# Patient Record
Sex: MALE | Employment: OTHER | URBAN - METROPOLITAN AREA
[De-identification: names, ages, dates, MRNs, and addresses within clinical notes are randomized per-mention and may not be internally consistent; named-entity substitution may affect disease eponyms.]

---

## 2017-11-15 ENCOUNTER — TRANSFERRED RECORDS (OUTPATIENT)
Dept: FAMILY MEDICINE | Facility: CLINIC | Age: 62
End: 2017-11-15

## 2017-12-28 DIAGNOSIS — Z76.0 ENCOUNTER FOR MEDICATION REFILL: ICD-10-CM

## 2017-12-28 DIAGNOSIS — I10 BENIGN ESSENTIAL HYPERTENSION: ICD-10-CM

## 2017-12-28 RX ORDER — LISINOPRIL 10 MG/1
TABLET ORAL
Qty: 90 TABLET | Refills: 0 | Status: SHIPPED | OUTPATIENT
Start: 2017-12-28 | End: 2018-01-26

## 2017-12-28 RX ORDER — METOPROLOL SUCCINATE 50 MG/1
TABLET, EXTENDED RELEASE ORAL
Qty: 90 TABLET | Refills: 0 | Status: SHIPPED | OUTPATIENT
Start: 2017-12-28 | End: 2018-05-01

## 2017-12-28 NOTE — TELEPHONE ENCOUNTER
lisinopril (PRINIVIL/ZESTRIL) 10 MG,metoprolol (TOPROL-XL) 50 MG 24 hr tablet   LAST  Med check 10/31/16   last labs(for RX) 10/31/16  Next  appt scheduled =  None- due  Marcie Freitas MA    BP Readings from Last 3 Encounters:   11/10/16 145/76   10/31/16 110/62   09/30/16 135/82     Last Basic Metabolic Panel:  Lab Results   Component Value Date     10/31/2016      Lab Results   Component Value Date    POTASSIUM 4.7 10/31/2016     Lab Results   Component Value Date    CHLORIDE 102 10/31/2016     Lab Results   Component Value Date    WILLY 9.0 10/31/2016     No results found for: CO2  Lab Results   Component Value Date    BUN 22 10/31/2016    BUN 20 10/31/2016     Lab Results   Component Value Date    CR 1.10 10/31/2016     Lab Results   Component Value Date     10/31/2016

## 2018-01-12 ENCOUNTER — OFFICE VISIT (OUTPATIENT)
Dept: FAMILY MEDICINE | Facility: CLINIC | Age: 63
End: 2018-01-12

## 2018-01-12 ENCOUNTER — OFFICE VISIT (OUTPATIENT)
Dept: CARDIOLOGY | Facility: CLINIC | Age: 63
End: 2018-01-12
Payer: COMMERCIAL

## 2018-01-12 VITALS
WEIGHT: 200 LBS | SYSTOLIC BLOOD PRESSURE: 122 MMHG | BODY MASS INDEX: 28 KG/M2 | HEIGHT: 71 IN | TEMPERATURE: 98.7 F | RESPIRATION RATE: 20 BRPM | DIASTOLIC BLOOD PRESSURE: 74 MMHG | HEART RATE: 60 BPM | OXYGEN SATURATION: 99 %

## 2018-01-12 VITALS
SYSTOLIC BLOOD PRESSURE: 132 MMHG | WEIGHT: 202 LBS | HEIGHT: 70 IN | HEART RATE: 60 BPM | DIASTOLIC BLOOD PRESSURE: 76 MMHG | BODY MASS INDEX: 28.92 KG/M2

## 2018-01-12 DIAGNOSIS — Z82.49 FAMILY HISTORY OF ISCHEMIC HEART DISEASE: ICD-10-CM

## 2018-01-12 DIAGNOSIS — F10.21 ALCOHOL DEPENDENCE IN REMISSION (H): ICD-10-CM

## 2018-01-12 DIAGNOSIS — Z72.0 TOBACCO ABUSE: ICD-10-CM

## 2018-01-12 DIAGNOSIS — I10 ESSENTIAL HYPERTENSION: Primary | ICD-10-CM

## 2018-01-12 DIAGNOSIS — Z13.220 LIPID SCREENING: ICD-10-CM

## 2018-01-12 DIAGNOSIS — I49.3 PVC'S (PREMATURE VENTRICULAR CONTRACTIONS): ICD-10-CM

## 2018-01-12 DIAGNOSIS — Z12.5 SCREENING FOR PROSTATE CANCER: ICD-10-CM

## 2018-01-12 PROBLEM — F10.20 ALCOHOL DEPENDENCE (H): Status: ACTIVE | Noted: 2018-01-12

## 2018-01-12 PROCEDURE — 83735 ASSAY OF MAGNESIUM: CPT | Mod: 90 | Performed by: FAMILY MEDICINE

## 2018-01-12 PROCEDURE — 80053 COMPREHEN METABOLIC PANEL: CPT | Mod: 90 | Performed by: FAMILY MEDICINE

## 2018-01-12 PROCEDURE — 99214 OFFICE O/P EST MOD 30 MIN: CPT | Performed by: INTERNAL MEDICINE

## 2018-01-12 PROCEDURE — 84443 ASSAY THYROID STIM HORMONE: CPT | Mod: 90 | Performed by: FAMILY MEDICINE

## 2018-01-12 PROCEDURE — 80061 LIPID PANEL: CPT | Mod: 90 | Performed by: FAMILY MEDICINE

## 2018-01-12 PROCEDURE — 99213 OFFICE O/P EST LOW 20 MIN: CPT | Performed by: FAMILY MEDICINE

## 2018-01-12 PROCEDURE — 93000 ELECTROCARDIOGRAM COMPLETE: CPT | Performed by: INTERNAL MEDICINE

## 2018-01-12 PROCEDURE — 36415 COLL VENOUS BLD VENIPUNCTURE: CPT | Performed by: FAMILY MEDICINE

## 2018-01-12 PROCEDURE — 84153 ASSAY OF PSA TOTAL: CPT | Mod: 90 | Performed by: FAMILY MEDICINE

## 2018-01-12 NOTE — LETTER
1/12/2018      Jorge L Mullen MD  [address]  [facility] [address]      RE: Christophe Del Rosario       Dear Colleague,    I had the pleasure of seeing Christophe Del Rosario in the [facility].    REFERRING PHYSICIAN:  Dr. Jorge L Mullen      HISTORY OF PRESENT ILLNESS:  It is my pleasure to see your patient, Christophe Del Rosario, who is a very pleasant 62-year-old gentleman who in the past had a diagnosis of essential hypertension, borderline hyperlipidemia and mild obstructive sleep apnea.  He also smokes about 5 cigarettes per day.  He has a significant family history for cardiovascular disease.  His father around this age suffered a myocardial infarct.  His brother who is several years younger than him suffered a stroke recently.  His last lipid profile that I see was from 2016 and this was borderline for primary prevention.  His LDL was 122, HDL was 51 and triglycerides were 61 with total cholesterol of 185.  He is not complaining of any chest pains or chest pressure.  In the past, he has complained of chest discomfort and had a stress echocardiogram performed approximately 2-1/2 years ago.  This was normal with no evidence of ischemia.  We did perform a 12-lead electrocardiogram today as we always do with our new patients and this showed that the patient was having quite frequent PVCs.  He has an early repolarization in V2.  Otherwise, no ischemic changes were noted.        He does not complain of undue shortness of breath, arm, throat or jaw discomfort with exertion.  He does not feel palpitations.  He has had no syncope or near syncope.  He is not complaining of the symptoms of congestive heart failure either.      IMPRESSION:   1.  Family history of early atherosclerosis, as described above.   2.  Tobacco abuse.   3.  Essential hypertension under good control with a blood pressure of 132/76 recorded today.   4.  Frequent PVCs on the EKG.      PLAN:     1.  Firstly, with  respect to the PVCs, we will check a basic metabolic profile, magnesium and TSH.  We will also perform a stress echocardiogram to determine if there is any evidence of ischemia or structural heart disease underlying the PVCs.     2.  Because of his interest in determining his risk for cardiovascular disease, we will perform a calcium score to determine if he has any evidence of coronary atherosclerosis.  The presence of coronary atherosclerosis would drastically change his lipid management.     3.  We had a discussion at the end of our visit about stopping smoking, and  the risks associated with smoking but in particular, the major risk of cardiovascular disease.  I will see the patient back again in a month's time with the results of our tests.        It has been my pleasure being involved in the care of this very nice patient.       Outpatient Encounter Prescriptions as of 1/12/2018   Medication Sig Dispense Refill     lisinopril (PRINIVIL/ZESTRIL) 10 MG tablet TAKE 1 TABLET(10 MG) BY MOUTH DAILY 90 tablet 0     metoprolol (TOPROL-XL) 50 MG 24 hr tablet TAKE 1 TABLET(50 MG) BY MOUTH DAILY 90 tablet 0     aspirin 81 MG chewable tablet Take 1 tablet (81 mg) by mouth daily 108 tablet 3     [DISCONTINUED] metoprolol (TOPROL-XL) 100 MG 24 hr tablet TAKE 1 TABLET BY MOUTH DAILY 90 tablet 0     [DISCONTINUED] Naproxen Sodium (ALEVE PO) Take 220 mg by mouth as needed for moderate pain       No facility-administered encounter medications on file as of 1/12/2018.        Again, thank you for allowing me to participate in the care of your patient.      Sincerely,    Reji Medellin MD, MD     [facility]

## 2018-01-12 NOTE — PROGRESS NOTES
Problem(s) Oriented visit        SUBJECTIVE:                                                    Christophe Del Rosario is a 62 year old male who presents to clinic today for the following health issues :      1. Essential hypertension  Blood presure remains well controlled when checked out of clinic.    Reviewed last 6 BP readings in chart:  BP Readings from Last 6 Encounters:   01/12/18 122/74   01/12/18 132/76   11/10/16 145/76   10/31/16 110/62   09/30/16 135/82   09/14/16 110/68       he has not experienced any significant side effects from medications for hypertension.    NO active cardiac complaints or symptoms with exercise.    - Comp. Metabolic Panel (14) ([facility])  - TSH ([facility])  - Magnesium  Serum ([facility])    2. Family history of ischemic heart disease  Saw cardiology this morning and is now scheduled to have some tests  - TSH ([facility])    3. Alcohol dependence in process of decreasing  Down from 25-30 a week to 15-20 per week    4. Lipid screening  due  - Lipid Panel ([facility])    5. Screening for prostate cancer  due  - PSA Serum ([facility])         Problem list, Medication list, Allergies, and Medical/Social/Surgical histories reviewed in [facility] and updated as appropriate.   Additional history: as documented    ROS:  General and Resp. completed and negative except as noted above    Histories:   Patient Active Problem List   Diagnosis     HTN (hypertension)     Health Care Home     Advance Care Planning     Hip pain, left     Essential hypertension     BARTOLO (obstructive sleep apnea)     Chest pain     Alcohol dependence (H)     History reviewed. No pertinent surgical history.    Social History   Substance Use Topics     Smoking status: Current Every Day Smoker     Packs/day: 0.10     Types: Cigarettes     Smokeless tobacco: Never Used      Comment: 3-4 cigs a day     Alcohol use 12.0 oz/week     20 Standard drinks or equivalent per week      Comment: 15-20 per week     Family History   Problem Relation Age of  "Onset     Coronary Artery Disease Father      CEREBROVASCULAR DISEASE Brother 53     small stroke           OBJECTIVE:                                                    /74  Pulse 60  Temp 98.7  F (37.1  C)  Resp 20  Ht 1.803 m (5' 11\")  Wt 90.7 kg (200 lb)  SpO2 99%  BMI 27.89 kg/m2  Body mass index is 27.89 kg/(m^2).   APPEARANCE: = Relaxed and in no distress     ASSESSMENT/PLAN:                                                        Christophe was seen today for recheck medication.    Diagnoses and all orders for this visit:    Essential hypertension  -     Comp. Metabolic Panel (14) ([facility])  -     TSH ([facility])  -     Magnesium  Serum ([facility])    Family history of ischemic heart disease  -     TSH ([facility])    Alcohol dependence in process of decreasing    Lipid screening  -     Lipid Panel ([facility])    Screening for prostate cancer  -     PSA Serum ([facility])        Work on weight loss  Regular exercise  Drink less    The following health maintenance items are reviewed in Epic and correct as of today:  Health Maintenance   Topic Date Due     HEPATITIS C SCREENING  10/21/1973     COLONOSCOPY Q5 YR  01/05/2014     INFLUENZA VACCINE (SYSTEM ASSIGNED)  09/01/2017     ADVANCE DIRECTIVE PLANNING Q5 YRS  01/26/2021     LIPID SCREEN Q5 YR MALE (SYSTEM ASSIGNED)  10/31/2021     TETANUS IMMUNIZATION (SYSTEM ASSIGNED)  08/27/2023       Jorge L Mullen MD  [facility]  Family Practice  [facility]  [phone]    For any issues my office # is [phone]        "

## 2018-01-12 NOTE — PROGRESS NOTES
REFERRING PHYSICIAN:  Dr. Jorge L Mullen      HISTORY OF PRESENT ILLNESS:  It is my pleasure to see your patient, Christophe Del Rosario, who is a very pleasant 62-year-old gentleman who in the past had a diagnosis of essential hypertension, borderline hyperlipidemia and mild obstructive sleep apnea.  He also smokes about 5 cigarettes per day.  He has a significant family history for cardiovascular disease.  His father around this age suffered a myocardial infarct.  His brother who is several years younger than him suffered a stroke recently.  His last lipid profile that I see was from 2016 and this was borderline for primary prevention.  His LDL was 122, HDL was 51 and triglycerides were 61 with total cholesterol of 185.  He is not complaining of any chest pains or chest pressure.  In the past, he has complained of chest discomfort and had a stress echocardiogram performed approximately 2-1/2 years ago.  This was normal with no evidence of ischemia.  We did perform a 12-lead electrocardiogram today as we always do with our new patients and this showed that the patient was having quite frequent PVCs.  He has an early repolarization in V2.  Otherwise, no ischemic changes were noted.        He does not complain of undue shortness of breath, arm, throat or jaw discomfort with exertion.  He does not feel palpitations.  He has had no syncope or near syncope.  He is not complaining of the symptoms of congestive heart failure either.      IMPRESSION:   1.  Family history of early atherosclerosis, as described above.   2.  Tobacco abuse.   3.  Essential hypertension under good control with a blood pressure of 132/76 recorded today.   4.  Frequent PVCs on the EKG.      PLAN:     1.  Firstly, with respect to the PVCs, we will check a basic metabolic profile, magnesium and TSH.  We will also perform a stress echocardiogram to determine if there is any evidence of ischemia or structural heart disease underlying the PVCs.     2.   Because of his interest in determining his risk for cardiovascular disease, we will perform a calcium score to determine if he has any evidence of coronary atherosclerosis.  The presence of coronary atherosclerosis would drastically change his lipid management.     3.  We had a discussion at the end of our visit about stopping smoking, and  the risks associated with smoking but in particular, the major risk of cardiovascular disease.  I will see the patient back again in a month's time with the results of our tests.        It has been my pleasure being involved in the care of this very nice patient.      Reji Sahni MD, FACC      cc:   Jorge L Mullen MD    [facility]   [address] [place] [address]         REJI SAHNI MD, FACC             D: 2018 08:59   T: 2018 11:06   MT: BARBRA      Name:     CHRYSTAL JACKSON   MRN:      [id number]        Account:      [id number]   :      1955           Service Date: 2018      Document: [id number]

## 2018-01-12 NOTE — PROGRESS NOTES
HPI and Plan:   See dictation    Orders Placed This Encounter   Procedures     CT Coronary Calcium Scan     Basic metabolic panel     TSH     Magnesium     Lipid Profile     ALT     Follow-Up with Cardiologist     EKG 12-lead complete w/read - Clinics (performed today)     Exercise Stress Echocardiogram       No orders of the defined types were placed in this encounter.      Medications Discontinued During This Encounter   Medication Reason     metoprolol (TOPROL-XL) 100 MG 24 hr tablet Stopped by Patient     Naproxen Sodium (ALEVE PO) Stopped by Patient         Encounter Diagnoses   Name Primary?     Essential hypertension Yes     PVC's (premature ventricular contractions)      Family history of ischemic heart disease      Tobacco abuse        CURRENT MEDICATIONS:  Current Outpatient Prescriptions   Medication Sig Dispense Refill     lisinopril (PRINIVIL/ZESTRIL) 10 MG tablet TAKE 1 TABLET(10 MG) BY MOUTH DAILY 90 tablet 0     metoprolol (TOPROL-XL) 50 MG 24 hr tablet TAKE 1 TABLET(50 MG) BY MOUTH DAILY 90 tablet 0     aspirin 81 MG chewable tablet Take 1 tablet (81 mg) by mouth daily 108 tablet 3     [DISCONTINUED] metoprolol (TOPROL-XL) 100 MG 24 hr tablet TAKE 1 TABLET BY MOUTH DAILY 90 tablet 0       ALLERGIES   No Known Allergies    PAST MEDICAL HISTORY:  Past Medical History:   Diagnosis Date     Chest pain      HTN (hypertension) 2/27/2012       PAST SURGICAL HISTORY:  No past surgical history on file.    FAMILY HISTORY:  Family History   Problem Relation Age of Onset     Coronary Artery Disease Father      CEREBROVASCULAR DISEASE Brother        SOCIAL HISTORY:  Social History     Social History     Marital status:      Spouse name: Flory in 1996     Number of children: N/A     Years of education: N/A     Occupational History      [employer]     Social History Main Topics     Smoking status: Current Every Day Smoker     Packs/day: 0.10     Types: Cigarettes     Smokeless tobacco: Never Used  "     Comment: 3-4 cigs a day     Alcohol use 12.0 oz/week     20 Standard drinks or equivalent per week     Drug use: No     Sexual activity: Yes     Partners: Female     Other Topics Concern     None     Social History Narrative       Review of Systems:  Skin:  Negative       Eyes:  Positive for glasses    ENT:  Negative      Respiratory:  Negative       Cardiovascular:  Negative      Gastroenterology: Negative      Genitourinary:  not assessed      Musculoskeletal:  Negative      Neurologic:  Negative      Psychiatric:  Negative      Heme/Lymph/Imm:  Negative      Endocrine:  Negative        Physical Exam:  Vitals: /76  Pulse 60  Ht 1.778 m (5' 10\")  Wt 91.6 kg (202 lb)  BMI 28.98 kg/m2    Constitutional:  cooperative, alert and oriented, well developed, well nourished, in no acute distress        Skin:  warm and dry to the touch, no apparent skin lesions or masses noted          Head:  normocephalic, no masses or lesions        Eyes:  pupils equal and round, conjunctivae and lids unremarkable, sclera white, no xanthalasma, EOMS intact, no nystagmus        Lymph:No Cervical lymphadenopathy present     ENT:  no pallor or cyanosis, dentition good        Neck:  carotid pulses are full and equal bilaterally, JVP normal, no carotid bruit        Respiratory:  normal breath sounds, clear to auscultation, normal A-P diameter, normal symmetry, normal respiratory excursion, no use of accessory muscles         Cardiac: regular rhythm, normal S1/S2, no S3 or S4, apical impulse not displaced, no murmurs, gallops or rubs occasional premature beats                                                       GI:  abdomen soft, non-tender, BS normoactive, no mass, no HSM, no bruits        Extremities and Muscular Skeletal:  no deformities, clubbing, cyanosis, erythema observed;no edema              Neurological:  no gross motor deficits;affect appropriate        Psych:  Alert and Oriented x 3        CC  Jorge L Mullen, " MD  [address] [place] [address]

## 2018-01-12 NOTE — MR AVS SNAPSHOT
After Visit Summary   1/12/2018    Christophe Del Rosario    MRN: [id number]           Patient Information     Date Of Birth          1955        Visit Information        Provider Department      1/12/2018 9:30 AM Jorge L Mullen MD [facility]        Today's Diagnoses     Essential hypertension    -  1    Family history of ischemic heart disease        Alcohol dependence in process of decreasing        Lipid screening        Screening for prostate cancer           Follow-ups after your visit        Your next 10 appointments already scheduled     Jan 18, 2018  9:20 AM CST   LAB with BAE LAB   [facility] ([facility])    [address] [place] [address] [address]  [facility] [id number] [phone]           Please do not eat 10-12 hours before your appointment if you are coming in fasting for labs on lipids, cholesterol, or glucose (sugar). This does not apply to pregnant women. Water, hot tea and black coffee (with nothing added) are okay. Do not drink other fluids, diet soda or chew gum.            Jan 18, 2018 10:00 AM CST   CT CALCIUM SCREENING with SCICT1   [facility] ([facility])    [address] [place] [address] [address]  [facility] [id number] [phone]           It is best to avoid caffeine on the day of your test.  Be sure to tell your doctor:   If there s any chance you are pregnant.   If you have any special needs.  Please wear loose clothing, such as a sweat suit or jogging clothes. Avoid snaps, zippers and other metal. We may ask you to undress and put on a hospital gown.  If you have any questions, please call the Imaging Department where you will have your exam.            Jan 18, 2018 10:30 AM CST   Ech Stress Test with [id number]   [facility] Echocardiography ([facility])    [address] [place] [address]  [id number] [phone]           1. Please bring or wear a comfortable two-piece outfit and walking shoes. 2. Stop eating 3 hours before the test. You may drink water or juice. 3. Stop all caffeine 12 hours before the test. This includes coffee, tea, soda pop, chocolate and certain medicines (such as Anacin and Excederin). Also avoid decaf coffee and tea, as these contain small amounts of caffeine. 4. No alcohol, smoking or use of other tobacco products for 12 hours before the test. 5. Refer to your provider instructions to see if you need to stop any medications (such as beta-blockers or nitrates) for this test. 6. For patients with diabetes: - If you take insulin, call your diabetes care team. Ask if you should take a   dose the morning of your test. - If you take diabetes medicine by mouth, dont take it on the morning of your test. Bring it with you to take after the test. (If you have questions, call your diabetes care team) 7. When you arrive, please tell us if: - You have diabetes. - You have taken Viagra, Cialis or Levitra in the past 48 hours. 8. For any questions that cannot be answered, please contact the ordering physician            Mar 15, 2018 12:15 PM CDT   Return Visit with Reji Medellin MD   [facility] ([facility])    [address] [place] [id number] [phone]              Future tests that were ordered for you today     Open Future Orders        Priority Expected Expires Ordered    Follow-Up with Cardiologist Routine 2/11/2018 1/12/2019 1/12/2018    Exercise Stress Echocardiogram Routine 1/19/2018 1/12/2019 1/12/2018    CT Coronary Calcium Scan Routine 1/13/2018 1/12/2019 1/12/2018            Who to contact     If you have questions or need follow up information about today's clinic visit or your schedule please contact [facility] directly at [phone].  Normal or non-critical lab and imaging  "results will be communicated to you by MyChart, letter or phone within 4 business days after the clinic has received the results. If you do not hear from us within 7 days, please contact the clinic through [employer] or phone. If you have a critical or abnormal lab result, we will notify you by phone as soon as possible.  Submit refill requests through [employer] or call your pharmacy and they will forward the refill request to us. Please allow 3 business days for your refill to be completed.          Additional Information About Your Visit        [employer] Information     [employer] gives you secure access to your electronic health record. If you see a primary care provider, you can also send messages to your care team and make appointments. If you have questions, please call your primary care clinic.  If you do not have a primary care provider, please call [phone] and they will assist you.        Care EveryWhere ID     This is your Care EveryWhere ID. This could be used by other organizations to access your [facility] medical records  [id number]        Your Vitals Were     Pulse Temperature Respirations Height Pulse Oximetry BMI (Body Mass Index)    60 98.7  F (37.1  C) 20 1.803 m (5' 11\") 99% 27.89 kg/m2       Blood Pressure from Last 3 Encounters:   01/12/18 122/74   01/12/18 132/76   11/10/16 145/76    Weight from Last 3 Encounters:   01/12/18 90.7 kg (200 lb)   01/12/18 91.6 kg (202 lb)   11/10/16 90.3 kg (199 lb)              We Performed the Following     Comp. Metabolic Panel (14) ([facility])     Lipid Panel ([facility])     Magnesium  Serum ([facility])     PSA Serum ([facility])     TSH ([facility])        Primary Care Provider Office Phone # Fax #    Jorge L Mullen [phone] [address] [facility] [address]        Equal Access to Services     JOSEY ALEXANDER : Brent cruz Socooper, waaxda luqadaha, qaybta kaalmanola mon, charo mejia . So wa " [phone].    ATENCIÓN: Si william fung, tiene a mckenzie disposición servicios gratuitos de asistencia lingüística. Clara bethea [phone].    We comply with applicable federal civil rights laws and [place] laws. We do not discriminate on the basis of race, color, national origin, age, disability, sex, sexual orientation, or gender identity.            Thank you!     Thank you for choosing [facility]  for your care. Our goal is always to provide you with excellent care. Hearing back from our patients is one way we can continue to improve our services. Please take a few minutes to complete the written survey that you may receive in the mail after your visit with us. Thank you!             Your Updated Medication List - Protect others around you: Learn how to safely use, store and throw away your medicines at www.disposemymeds.org.          This list is accurate as of: 1/12/18  9:55 AM.  Always use your most recent med list.                   Brand Name Dispense Instructions for use Diagnosis    aspirin 81 MG chewable tablet     108 tablet    Take 1 tablet (81 mg) by mouth daily    Benign essential hypertension       lisinopril 10 MG tablet    PRINIVIL/ZESTRIL    90 tablet    TAKE 1 TABLET(10 MG) BY MOUTH DAILY    Encounter for medication refill       metoprolol succinate 50 MG 24 hr tablet    TOPROL-XL    90 tablet    TAKE 1 TABLET(50 MG) BY MOUTH DAILY    Benign essential hypertension

## 2018-01-12 NOTE — MR AVS SNAPSHOT
After Visit Summary   1/12/2018    Christophe Del Rosario    MRN: [id number]           Patient Information     Date Of Birth          1955        Visit Information        Provider Department      1/12/2018 8:15 AM Reji Medellin MD [facility]        Today's Diagnoses     Essential hypertension    -  1    PVC's (premature ventricular contractions)        Family history of ischemic heart disease        Tobacco abuse           Follow-ups after your visit        Additional Services     Follow-Up with Cardiologist                 Your next 10 appointments already scheduled     Jan 12, 2018  9:30 AM CST   SHORT with Jorge L Mullen MD   [facility] ([facility])    [address] [place] [id number] [phone]            Jan 18, 2018  9:10 AM CST   LAB with BAE LAB   [facility] ([facility])    [address] [place] [address] [address]  [facility] [id number] [phone]           Please do not eat 10-12 hours before your appointment if you are coming in fasting for labs on lipids, cholesterol, or glucose (sugar). This does not apply to pregnant women. Water, hot tea and black coffee (with nothing added) are okay. Do not drink other fluids, diet soda or chew gum.            Jan 18, 2018  9:30 AM CST   Ech Stress Test with [id number]   [facility] (Cardiovascular Imaging at [facility])    [address] [place] [address]  [address]  [facility] [id number] [phone]           1. Please bring or wear a comfortable two-piece outfit and walking shoes. 2. Stop eating 3 hours before the test. You may drink water or juice. 3. Stop all caffeine 12 hours before the test. This includes coffee, tea, soda pop, chocolate and certain medicines (such as Anacin and Excederin). Also avoid decaf coffee and tea, as these contain small amounts of caffeine. 4. No  alcohol, smoking or use of other tobacco products for 12 hours before the test. 5. Refer to your provider instructions to see if you need to stop any medications (such as beta-blockers or nitrates) for this test. 6. For patients with diabetes: - If you take insulin, call your diabetes care team. Ask if you should take a   dose the morning of your test. - If you take diabetes medicine by mouth, dont take it on the morning of your test. Bring it with you to take after the test. (If you have questions, call your diabetes care team) 7. When you arrive, please tell us if: - You have diabetes. - You have taken Viagra, Cialis or Levitra in the past 48 hours. 8. For any questions that cannot be answered, please contact the ordering physician            Jan 18, 2018 11:00 AM CST   CT CALCIUM SCREENING with SCICT1   [facility] (Cardiovascular Imaging at [facility])    [address] [place] [address] [address]  [facility] [address] [phone]           It is best to avoid caffeine on the day of your test.  Be sure to tell your doctor:   If there s any chance you are pregnant.   If you have any special needs.  Please wear loose clothing, such as a sweat suit or jogging clothes. Avoid snaps, zippers and other metal. We may ask you to undress and put on a hospital gown.  If you have any questions, please call the Imaging Department where you will have your exam.            Mar 15, 2018 12:15 PM CDT   Return Visit with Reji Medellin MD   [facility] ([facility])    [address] [place] [address] [address]  [facility] [address]   [phone]              Future tests that were ordered for you today     Open Future Orders        Priority Expected Expires Ordered    Lipid Profile Routine 2/11/2018 1/12/2019 1/12/2018    ALT Routine 2/11/2018 1/12/2019 1/12/2018    Follow-Up with Cardiologist Routine 2/11/2018 1/12/2019 1/12/2018    Magnesium Routine  "1/12/2018 1/12/2019 1/12/2018    Basic metabolic panel Routine 1/12/2018 1/12/2019 1/12/2018    TSH Routine 1/12/2018 1/12/2019 1/12/2018    Exercise Stress Echocardiogram Routine 1/19/2018 1/12/2019 1/12/2018    CT Coronary Calcium Scan Routine 1/13/2018 1/12/2019 1/12/2018            Who to contact     If you have questions or need follow up information about today's clinic visit or your schedule please contact [facility] directly at [phone].  Normal or non-critical lab and imaging results will be communicated to you by Ambassadorhart, letter or phone within 4 business days after the clinic has received the results. If you do not hear from us within 7 days, please contact the clinic through ColorChipt or phone. If you have a critical or abnormal lab result, we will notify you by phone as soon as possible.  Submit refill requests through ShinyByte or call your pharmacy and they will forward the refill request to us. Please allow 3 business days for your refill to be completed.          Additional Information About Your Visit        Ambassadorhart Information     ShinyByte gives you secure access to your electronic health record. If you see a primary care provider, you can also send messages to your care team and make appointments. If you have questions, please call your primary care clinic.  If you do not have a primary care provider, please call [phone] and they will assist you.        Care EveryWhere ID     This is your Care EveryWhere ID. This could be used by other organizations to access your [place] medical records  [id number]        Your Vitals Were     Pulse Height BMI (Body Mass Index)             60 1.778 m (5' 10\") 28.98 kg/m2          Blood Pressure from Last 3 Encounters:   01/12/18 132/76   11/10/16 145/76   10/31/16 110/62    Weight from Last 3 Encounters:   01/12/18 91.6 kg (202 lb)   11/10/16 90.3 kg (199 lb)   10/31/16 92.1 kg (203 lb)              We Performed the " Following     EKG 12-lead complete w/read - Clinics (performed today)        Primary Care Provider Office Phone # Fax #    Jorge L Mullen [phone] [address]  [facility] [address]        Equal Access to Services     JOSEY ALEXANDER : Hadii aad ku hadasho Soomaali, waaxda luqadaha, qaybta kaalmada adeegyada, waxay quyenin hayaan adeeg dann lafartun . So [facility] [phone].    ATENCIÓN: Si habla español, tiene a mckenzie disposición servicios gratuitos de asistencia lingüística. Clara al [phone].    We comply with applicable federal civil rights laws and [place] laws. We do not discriminate on the basis of race, color, national origin, age, disability, sex, sexual orientation, or gender identity.            Thank you!     Thank you for choosing [facility]  for your care. Our goal is always to provide you with excellent care. Hearing back from our patients is one way we can continue to improve our services. Please take a few minutes to complete the written survey that you may receive in the mail after your visit with us. Thank you!             Your Updated Medication List - Protect others around you: Learn how to safely use, store and throw away your medicines at www.disposemymeds.org.          This list is accurate as of: 1/12/18  9:03 AM.  Always use your most recent med list.                   Brand Name Dispense Instructions for use Diagnosis    aspirin 81 MG chewable tablet     108 tablet    Take 1 tablet (81 mg) by mouth daily    Benign essential hypertension       lisinopril 10 MG tablet    PRINIVIL/ZESTRIL    90 tablet    TAKE 1 TABLET(10 MG) BY MOUTH DAILY    Encounter for medication refill       metoprolol succinate 50 MG 24 hr tablet    TOPROL-XL    90 tablet    TAKE 1 TABLET(50 MG) BY MOUTH DAILY    Benign essential hypertension

## 2018-01-13 LAB
ALBUMIN SERPL-MCNC: 4.3 G/DL (ref 3.6–4.8)
ALBUMIN/GLOB SERPL: 2.2 {RATIO} (ref 1.2–2.2)
ALP SERPL-CCNC: 90 IU/L (ref 39–117)
ALT SERPL-CCNC: 16 IU/L (ref 0–44)
AST SERPL-CCNC: 27 IU/L (ref 0–40)
BILIRUB SERPL-MCNC: 0.6 MG/DL (ref 0–1.2)
BUN SERPL-MCNC: 24 MG/DL (ref 8–27)
BUN/CREATININE RATIO: 27 (ref 10–24)
CALCIUM SERPL-MCNC: 9 MG/DL (ref 8.6–10.2)
CHLORIDE SERPLBLD-SCNC: 101 MMOL/L (ref 96–106)
CHOLEST SERPL-MCNC: 214 MG/DL (ref 100–199)
CREAT SERPL-MCNC: 0.9 MG/DL (ref 0.76–1.27)
EGFR IF AFRICN AM: 105 ML/MIN/1.73
EGFR IF NONAFRICN AM: 91 ML/MIN/1.73
GLOBULIN, TOTAL: 2 G/DL (ref 1.5–4.5)
GLUCOSE SERPL-MCNC: 103 MG/DL (ref 65–99)
HDLC SERPL-MCNC: 55 MG/DL
LDL/HDL RATIO: 2.4 RATIO UNITS (ref 0–3.6)
LDLC SERPL CALC-MCNC: 130 MG/DL (ref 0–99)
MAGNESIUM SERPL-MCNC: 1.9 MG/DL (ref 1.6–2.3)
POTASSIUM SERPL-SCNC: 4.7 MMOL/L (ref 3.5–5.2)
PROT SERPL-MCNC: 6.3 G/DL (ref 6–8.5)
PSA NG/ML: 1.3 NG/ML (ref 0–4)
SODIUM SERPL-SCNC: 140 MMOL/L (ref 134–144)
TOTAL CO2: 24 MMOL/L (ref 18–28)
TRIGL SERPL-MCNC: 145 MG/DL (ref 0–149)
TSH BLD-ACNC: 1.62 UIU/ML (ref 0.45–4.5)
VLDLC SERPL CALC-MCNC: 29 MG/DL (ref 5–40)

## 2018-01-14 NOTE — PROGRESS NOTES
Dear Christophe,   I am writing to report that your included test results are within expected ranges. I do not suggest that we make any changes at this time.    Jorge L Mullen M.D.

## 2018-01-18 ENCOUNTER — HOSPITAL ENCOUNTER (OUTPATIENT)
Dept: CARDIOLOGY | Facility: CLINIC | Age: 63
End: 2018-01-18
Attending: INTERNAL MEDICINE
Payer: COMMERCIAL

## 2018-01-18 ENCOUNTER — HOSPITAL ENCOUNTER (OUTPATIENT)
Dept: CARDIOLOGY | Facility: CLINIC | Age: 63
Discharge: HOME | End: 2018-01-18
Attending: INTERNAL MEDICINE | Admitting: INTERNAL MEDICINE
Payer: COMMERCIAL

## 2018-01-18 DIAGNOSIS — Z82.49 FAMILY HISTORY OF ISCHEMIC HEART DISEASE: ICD-10-CM

## 2018-01-18 DIAGNOSIS — I49.3 PVC'S (PREMATURE VENTRICULAR CONTRACTIONS): ICD-10-CM

## 2018-01-18 PROCEDURE — 93321 DOPPLER ECHO F-UP/LMTD STD: CPT | Mod: 26 | Performed by: INTERNAL MEDICINE

## 2018-01-18 PROCEDURE — 75571 CT HRT W/O DYE W/CA TEST: CPT | Mod: 26 | Performed by: INTERNAL MEDICINE

## 2018-01-18 PROCEDURE — 93325 DOPPLER ECHO COLOR FLOW MAPG: CPT | Mod: 26 | Performed by: INTERNAL MEDICINE

## 2018-01-18 PROCEDURE — 25500064 ZZH RX 255 OP 636: Performed by: INTERNAL MEDICINE

## 2018-01-18 PROCEDURE — 93321 DOPPLER ECHO F-UP/LMTD STD: CPT | Mod: TC

## 2018-01-18 PROCEDURE — 93016 CV STRESS TEST SUPVJ ONLY: CPT | Performed by: INTERNAL MEDICINE

## 2018-01-18 PROCEDURE — 93350 STRESS TTE ONLY: CPT | Mod: 26 | Performed by: INTERNAL MEDICINE

## 2018-01-18 PROCEDURE — 93018 CV STRESS TEST I&R ONLY: CPT | Performed by: INTERNAL MEDICINE

## 2018-01-18 PROCEDURE — 75571 CT HRT W/O DYE W/CA TEST: CPT | Mod: GA

## 2018-01-18 RX ADMIN — HUMAN ALBUMIN MICROSPHERES AND PERFLUTREN 9 ML: 10; .22 INJECTION, SOLUTION INTRAVENOUS at 11:15

## 2018-01-19 ENCOUNTER — TELEPHONE (OUTPATIENT)
Dept: CARDIOLOGY | Facility: CLINIC | Age: 63
End: 2018-01-19

## 2018-01-19 NOTE — TELEPHONE ENCOUNTER
Reviewed stress echo showing   No arrhythmia noted.  Definity contrast was used without apparent complications.   This was a normal stress echocardiogram with no evidence of stress-induced ischemia.    Reviewed CT Ca score showing   CORONARY ARTERY CALCIUM SCORES:     Left main coronary artery: 0  Left anterior descending coronary artery: 226.65  Circumflex coronary artery: 23.01  Right coronary artery: 91.78    TOTAL CALCIUM SCORE: 341.44    Radiology report showed Scattered bilateral pulmonary nodules measuring up to 7 mm. Recommend follow-up as below.    Called pt with results, denies chest pain or shortness of breath. Pt advised to move up apt to see Dr. Medellin. Pt scheduled to see Dr. Medellin 1/26/18. Pt advised to go to the Ed if he does deveop chest pain or increased shortness of breath. Pt verbalized understanding. Will let Dr. Medellin know LPen[place] RN

## 2018-01-26 ENCOUNTER — ALLIED HEALTH/NURSE VISIT (OUTPATIENT)
Dept: CARDIOLOGY | Facility: CLINIC | Age: 63
End: 2018-01-26
Attending: INTERNAL MEDICINE
Payer: COMMERCIAL

## 2018-01-26 VITALS
HEART RATE: 66 BPM | HEIGHT: 71 IN | SYSTOLIC BLOOD PRESSURE: 138 MMHG | DIASTOLIC BLOOD PRESSURE: 78 MMHG | WEIGHT: 204 LBS | BODY MASS INDEX: 28.56 KG/M2

## 2018-01-26 DIAGNOSIS — Z76.0 ENCOUNTER FOR MEDICATION REFILL: ICD-10-CM

## 2018-01-26 DIAGNOSIS — I25.10 CORONARY ARTERY DISEASE INVOLVING NATIVE CORONARY ARTERY OF NATIVE HEART WITHOUT ANGINA PECTORIS: Primary | ICD-10-CM

## 2018-01-26 DIAGNOSIS — I49.3 PVC'S (PREMATURE VENTRICULAR CONTRACTIONS): ICD-10-CM

## 2018-01-26 DIAGNOSIS — R91.8 PULMONARY NODULES: ICD-10-CM

## 2018-01-26 DIAGNOSIS — Z82.49 FAMILY HISTORY OF ISCHEMIC HEART DISEASE: ICD-10-CM

## 2018-01-26 DIAGNOSIS — I10 ESSENTIAL HYPERTENSION: ICD-10-CM

## 2018-01-26 PROCEDURE — 99212 OFFICE O/P EST SF 10 MIN: CPT | Performed by: INTERNAL MEDICINE

## 2018-01-26 RX ORDER — ATORVASTATIN CALCIUM 80 MG/1
80 TABLET, FILM COATED ORAL DAILY
Qty: 30 TABLET | Refills: 11 | Status: SHIPPED | OUTPATIENT
Start: 2018-01-26 | End: 2019-02-19

## 2018-01-26 RX ORDER — LISINOPRIL 20 MG/1
20 TABLET ORAL DAILY
Qty: 90 TABLET | Refills: 3 | Status: SHIPPED | OUTPATIENT
Start: 2018-01-26 | End: 2018-11-21

## 2018-01-26 NOTE — MR AVS SNAPSHOT
After Visit Summary   1/26/2018    Christophe Del Rosario    MRN: [id number]           Patient Information     Date Of Birth          1955        Visit Information        Provider Department      1/26/2018 8:45 AM Reji Medellin MD [facility]        Today's Diagnoses     Coronary artery disease involving native coronary artery of native heart without angina pectoris    -  1    Essential hypertension        PVC's (premature ventricular contractions)        Family history of ischemic heart disease        Encounter for medication refill        Pulmonary nodules           Follow-ups after your visit        Additional Services     Follow-Up with Cardiologist                 Your next 10 appointments already scheduled     Mar 08, 2018  8:00 AM CST   LAB with BAE LAB   [facility] ([facility])    [address] [place], [place] [address]  [facility] [address]   [phone]           Please do not eat 10-12 hours before your appointment if you are coming in fasting for labs on lipids, cholesterol, or glucose (sugar). This does not apply to pregnant women. Water, hot tea and black coffee (with nothing added) are okay. Do not drink other fluids, diet soda or chew gum.            Apr 09, 2018  8:00 AM CDT   LAB with BAE LAB   [facility] [place] ([facility])    [facility] [id number]  [facility] [address]   [phone]           Please do not eat 10-12 hours before your appointment if you are coming in fasting for labs on lipids, cholesterol, or glucose (sugar). This does not apply to pregnant women. Water, hot tea and black coffee (with nothing added) are okay. Do not drink other fluids, diet soda or chew gum.              Future tests that were ordered for you today     Open Future Orders        Priority Expected Expires Ordered    CT Chest w/o & w Contrast Routine  "8/2/2018 1/26/2019 1/26/2018    Lipid Profile Routine 7/25/2018 1/26/2019 1/26/2018    ALT Routine 7/25/2018 1/26/2019 1/26/2018    Follow-Up with Cardiologist Routine 7/25/2018 1/26/2019 1/26/2018    Basic metabolic panel Routine 7/25/2018 1/26/2019 1/26/2018    Lipid Profile Routine 3/23/2018 1/26/2019 1/26/2018    ALT Routine 2/23/2018 1/26/2019 1/26/2018            Who to contact     If you have questions or need follow up information about today's clinic visit or your schedule please contact [facility] directly at [phone].  Normal or non-critical lab and imaging results will be communicated to you by MyChart, letter or phone within 4 business days after the clinic has received the results. If you do not hear from us within 7 days, please contact the clinic through Greenleaf Trusthart or phone. If you have a critical or abnormal lab result, we will notify you by phone as soon as possible.  Submit refill requests through Rei-Frontier or call your pharmacy and they will forward the refill request to us. Please allow 3 business days for your refill to be completed.          Additional Information About Your Visit        Greenleaf Trusthart Information     Rei-Frontier gives you secure access to your electronic health record. If you see a primary care provider, you can also send messages to your care team and make appointments. If you have questions, please call your primary care clinic.  If you do not have a primary care provider, please call [phone] and they will assist you.        Care EveryWhere ID     This is your Care EveryWhere ID. This could be used by other organizations to access your [place] medical records  [id number]        Your Vitals Were     Pulse Height BMI (Body Mass Index)             66 1.803 m (5' 11\") 28.45 kg/m2          Blood Pressure from Last 3 Encounters:   01/26/18 138/78   01/12/18 122/74   01/12/18 132/76    Weight from Last 3 Encounters:   01/26/18 92.5 kg (204 lb) "   01/12/18 90.7 kg (200 lb)   01/12/18 91.6 kg (202 lb)              We Performed the Following     Follow-Up with Cardiologist          Today's Medication Changes          These changes are accurate as of 1/26/18  9:30 AM.  If you have any questions, ask your nurse or doctor.               Start taking these medicines.        Dose/Directions    atorvastatin 80 MG tablet   Commonly known as:  LIPITOR   Used for:  Coronary artery disease involving native coronary artery of native heart without angina pectoris   Started by:  Reji Medellin MD        Dose:  80 mg   Take 1 tablet (80 mg) by mouth daily   Quantity:  30 tablet   Refills:  11         These medicines have changed or have updated prescriptions.        Dose/Directions    lisinopril 20 MG tablet   Commonly known as:  PRINIVIL/ZESTRIL   This may have changed:  See the new instructions.   Used for:  Encounter for medication refill   Changed by:  Reji Medellin MD        Dose:  20 mg   Take 1 tablet (20 mg) by mouth daily   Quantity:  90 tablet   Refills:  3            Where to get your medicines      These medications were sent to [facility] [address] [place] [address] [address] [facility]  [address] [facility] [address]     Phone:  [phone]     atorvastatin 80 MG tablet    lisinopril 20 MG tablet                Primary Care Provider Office Phone # Fax #    Jorge L Mullen [phone] [address]  [facility] [address]        Equal Access to Services     [facility] Hospital[facility] : Hadii aad ku hadasho Soomaali, waaxda luqadaha, qaybta kaalmada adeegyada, waxay candy mejia . So [facility] [phone].    ATENCIÓN: Si habla español, tiene a mckenzie disposición servicios gratuitos de asistencia lingüística. Llame al [phone].    We comply with applicable federal civil rights laws and [place] laws. We do not discriminate on the basis of race, color,  national origin, age, disability, sex, sexual orientation, or gender identity.            Thank you!     Thank you for choosing [facility]  for your care. Our goal is always to provide you with excellent care. Hearing back from our patients is one way we can continue to improve our services. Please take a few minutes to complete the written survey that you may receive in the mail after your visit with us. Thank you!             Your Updated Medication List - Protect others around you: Learn how to safely use, store and throw away your medicines at www.disposemymeds.org.          This list is accurate as of 1/26/18  9:30 AM.  Always use your most recent med list.                   Brand Name Dispense Instructions for use Diagnosis    aspirin 81 MG chewable tablet     108 tablet    Take 1 tablet (81 mg) by mouth daily    Benign essential hypertension       atorvastatin 80 MG tablet    LIPITOR    30 tablet    Take 1 tablet (80 mg) by mouth daily    Coronary artery disease involving native coronary artery of native heart without angina pectoris       lisinopril 20 MG tablet    PRINIVIL/ZESTRIL    90 tablet    Take 1 tablet (20 mg) by mouth daily    Encounter for medication refill       metoprolol succinate 50 MG 24 hr tablet    TOPROL-XL    90 tablet    TAKE 1 TABLET(50 MG) BY MOUTH DAILY    Benign essential hypertension

## 2018-01-26 NOTE — PROGRESS NOTES
HPI and Plan:   See dictation    Orders Placed This Encounter   Procedures     CT Chest w/o & w Contrast     Lipid Profile     ALT     Basic metabolic panel     Lipid Profile     ALT     Follow-Up with Cardiologist       Orders Placed This Encounter   Medications     atorvastatin (LIPITOR) 80 MG tablet     Sig: Take 1 tablet (80 mg) by mouth daily     Dispense:  30 tablet     Refill:  11     lisinopril (PRINIVIL/ZESTRIL) 20 MG tablet     Sig: Take 1 tablet (20 mg) by mouth daily     Dispense:  90 tablet     Refill:  3       Medications Discontinued During This Encounter   Medication Reason     lisinopril (PRINIVIL/ZESTRIL) 10 MG tablet Reorder         Encounter Diagnoses   Name Primary?     Essential hypertension      PVC's (premature ventricular contractions)      Family history of ischemic heart disease      Coronary artery disease involving native coronary artery of native heart without angina pectoris Yes     Encounter for medication refill      Pulmonary nodules        CURRENT MEDICATIONS:  Current Outpatient Prescriptions   Medication Sig Dispense Refill     atorvastatin (LIPITOR) 80 MG tablet Take 1 tablet (80 mg) by mouth daily 30 tablet 11     lisinopril (PRINIVIL/ZESTRIL) 20 MG tablet Take 1 tablet (20 mg) by mouth daily 90 tablet 3     metoprolol (TOPROL-XL) 50 MG 24 hr tablet TAKE 1 TABLET(50 MG) BY MOUTH DAILY 90 tablet 0     aspirin 81 MG chewable tablet Take 1 tablet (81 mg) by mouth daily 108 tablet 3     [DISCONTINUED] lisinopril (PRINIVIL/ZESTRIL) 10 MG tablet TAKE 1 TABLET(10 MG) BY MOUTH DAILY 90 tablet 0       ALLERGIES   No Known Allergies    PAST MEDICAL HISTORY:  Past Medical History:   Diagnosis Date     Chest pain      HTN (hypertension) 2/27/2012       PAST SURGICAL HISTORY:  No past surgical history on file.    FAMILY HISTORY:  Family History   Problem Relation Age of Onset     Coronary Artery Disease Father      CEREBROVASCULAR DISEASE Brother 53     small stroke       SOCIAL  "HISTORY:  Social History     Social History     Marital status:      Spouse name: Flory in 1996     Number of children: N/A     Years of education: N/A     Occupational History      [employer]     Social History Main Topics     Smoking status: Current Every Day Smoker     Packs/day: 0.10     Types: Cigarettes     Smokeless tobacco: Never Used      Comment: 3-4 cigs a day     Alcohol use 12.0 oz/week     20 Standard drinks or equivalent per week      Comment: 15-20 per week     Drug use: No     Sexual activity: Yes     Partners: Female     Other Topics Concern     None     Social History Narrative       Review of Systems:  Skin:  Negative       Eyes:  Positive for glasses    ENT:  Negative      Respiratory:  Negative       Cardiovascular:  Negative      Gastroenterology: Negative      Genitourinary:  Negative      Musculoskeletal:  Negative      Neurologic:  Negative      Psychiatric:  Negative      Heme/Lymph/Imm:  Negative      Endocrine:  Negative        Physical Exam:  Vitals: /78  Pulse 66  Ht 1.803 m (5' 11\")  Wt 92.5 kg (204 lb)  BMI 28.45 kg/m2    Constitutional:           Skin:             Head:           Eyes:           Lymph:      ENT:           Neck:           Respiratory:            Cardiac:                                                           GI:           Extremities and Muscular Skeletal:                 Neurological:           Psych:           CC  Reji Medellin MD  [address]  [place] [id number]              "

## 2018-01-26 NOTE — TELEPHONE ENCOUNTER
Received orders from Dr. Medellin to call pt to inform him that we will be repeating the CT of the chest in 6 months & 18 months to f/u on pulmonary nodules. Called pt as requested. Pt denies further questions at this time. LPen[place] RN

## 2018-03-05 ENCOUNTER — TELEPHONE (OUTPATIENT)
Dept: FAMILY MEDICINE | Facility: CLINIC | Age: 63
End: 2018-03-05

## 2018-03-05 DIAGNOSIS — M54.9 UPPER BACK PAIN: Primary | ICD-10-CM

## 2018-03-05 NOTE — TELEPHONE ENCOUNTER
Patient calls requesting referral for PT eval and treat for upper back pain x past 3-4 weeks. Symptoms started after bedridden x 3-4 days with flu 1 month ago. Has hx of back problems, mainly low back, and has good results with PT through [facility].   Symptoms reported as shooting pains in upper back when moves in certain ways. Has been doing stretching exercises on his own but not resolving. Denies numbness/tingling/weakness of extremities. Denies radiation of pain. No SOB/dyspnea.   Offered appt with Dr. Mullen. Patient prefers to start with PT if Dr. Mullen willing. Patient already has appt scheduled for 3/8 at [facility].   Plan: ok per Dr. Mullen for [facility] referral for PT eval and treat of upper back pain. Referral sent to [facility] via Folloze and patient informed.  Lili Paulino RN

## 2018-03-08 ENCOUNTER — THERAPY VISIT (OUTPATIENT)
Dept: PHYSICAL THERAPY | Facility: CLINIC | Age: 63
End: 2018-03-08
Payer: COMMERCIAL

## 2018-03-08 DIAGNOSIS — M54.2 CERVICALGIA: ICD-10-CM

## 2018-03-08 DIAGNOSIS — M72.2 PLANTAR FASCIITIS OF LEFT FOOT: ICD-10-CM

## 2018-03-08 DIAGNOSIS — I25.10 CORONARY ARTERY DISEASE INVOLVING NATIVE CORONARY ARTERY OF NATIVE HEART WITHOUT ANGINA PECTORIS: ICD-10-CM

## 2018-03-08 DIAGNOSIS — M89.8X1 PAIN OF RIGHT SCAPULA: Primary | ICD-10-CM

## 2018-03-08 LAB
ALT SERPL W P-5'-P-CCNC: <5 U/L (ref 5–30)
CHOLEST SERPL-MCNC: 130 MG/DL
HDLC SERPL-MCNC: 54 MG/DL
LDLC SERPL CALC-MCNC: 60 MG/DL
NONHDLC SERPL-MCNC: 76 MG/DL
TRIGL SERPL-MCNC: 79 MG/DL

## 2018-03-08 PROCEDURE — 97161 PT EVAL LOW COMPLEX 20 MIN: CPT | Mod: GP | Performed by: PHYSICAL THERAPIST

## 2018-03-08 PROCEDURE — 97110 THERAPEUTIC EXERCISES: CPT | Mod: GP | Performed by: PHYSICAL THERAPIST

## 2018-03-08 PROCEDURE — 80061 LIPID PANEL: CPT | Performed by: INTERNAL MEDICINE

## 2018-03-08 PROCEDURE — 36415 COLL VENOUS BLD VENIPUNCTURE: CPT | Performed by: INTERNAL MEDICINE

## 2018-03-08 PROCEDURE — 84460 ALANINE AMINO (ALT) (SGPT): CPT | Performed by: INTERNAL MEDICINE

## 2018-03-08 NOTE — LETTER
March 8, 2018     TO: Christophe Del Rosario   [address] [facility] [address]     Dear Mr. Del Rosario,  The results of your recent Laboratory tests.    Results for orders placed or performed in visit on 03/08/18   ALT   Result Value Ref Range    ALT <5 (L) 5 - 30 U/L   Lipid Profile   Result Value Ref Range    Cholesterol 130 <200 mg/dL    Triglycerides 79 <150 mg/dL    HDL Cholesterol 54 >39 mg/dL    LDL Cholesterol Calculated 60 <100 mg/dL    Non HDL Cholesterol 76 <130 mg/dL     Your test results fall within the expected range(s) or remain unchanged from previous results.  Please continue with current treatment plan. Please call [phone] with questions or concerns.     Sincerely,  [facility]

## 2018-03-09 NOTE — PROGRESS NOTES
[facility] Initial Evaluation    Subjective:  Patient is a 62 year old male presenting with rehab cervical spine hpi and rehab left ankle/foot hpi.   Christophe Del Rosario is a 62 year old male with a thoracic spine condition.  Condition occurred with:  Insidious onset.  Condition occurred: other.  This is a new condition  Patient notes pain medial to the root of the spine of the scapula on the R (in the  Upper back).  This has been present since about 1/31/18.  He notes it came on after he had the flu and laid in bed x 3-4 days.  .        Pain is described as aching and sharp and is intermittent and reported as 5/10.  Associated with: neck stiffness. Worse during: not dependent on time of day.  Symptoms are exacerbated by looking up or down and rotating head (reaching out/back) and relieved by nothing.  Since onset symptoms are gradually worsening.  Special testing: none.  Previous treatment: none.                                Christophe Del Rosario is a 62 year old male with a left foot condition.  Condition occurred with:  Running.  Condition occurred: in the community.  This is a new condition  Around 11/1/17 patient felt a pop while landing from a stride from running.  This was near the medial calcaneal tubercle.  He noted pain in the plantar fascia area after.  Some relief from rest/stretching, but still has intermittent symptoms and notes some more recent on the lateral foot near the peroneal tendon and it's insertion to the 5th metatarsal on the dorsal/lateral foot..    Patient reports pain:  Medial calcaneal tuberosity and longitudinal arch (lateral foot near 5th metatarsa/peroneal tendon).    Pain is described as aching and sharp and is intermittent and reported as 7/10.  Associated with: sometimes causes him to limp due to pain. Pain is worse in the A.M..  Exacerbated by: running >1 mile, walking. Relieved by: stretching, orthotics.  Since onset symptoms are gradually improving.  Special  tests:  X-ray (negative).  Previous treatment: none.    General health as reported by patient is excellent.  Pertinent medical history includes:  High blood pressure and smoking.  Medical allergies: no.  Other surgeries include:  None reported.  Current medications:  None as reported by patient.  Current occupation is retired.        Barriers include:  None as reported by patient.    Red flags:  None as reported by patient.                        Objective:  CERVICAL:    Posture: fair  Posture Correction: no effect    Neurological:    Motor Deficit:  Myotomes L R   C4 (shoulder elevation) 5 5   C5 (shoulder abduction) 5 5   C6 (elbow flexion) 5 5   C7 (elbow extension) 5 5   C8 (thumb extension) 5 5   T1 (finger add/abd) 5 5    Strength (lb) WNL WNL     Sensory Deficit, Reflexes, Dural Signs: intact light touch screen B UE dermatomes    AROM: (Major, Moderate, Minimal or Nil loss)  Movement Loss Clyde Mod Min Nil Pain   Protrusion    X Pain R scapula   Flexion    X No effect   Retraction   X  No effect   Extension   X  No effect   Left Rotation   X  No effect   Right Rotation   X  No effect   Left Side Bending   X  No effect   Right Side bending   X  No effect     Repeated movement testing:   (During: produces, abolishes, increases, decreases, no effect, centralizing, peripheralizing; After: better, worse, no better, no worse, no effect, centralized, peripheralized)    Pre-test Symptoms Sitting: none   Symptoms During Symptoms After ROM increased ROM decreased No Effect   PRO        Rep PRO        RET        Rep RET No effect No effect   X   RET EXT        Rep RET EXT No effect No effect   X   LF - R        Rep LF - R        LF - L        Rep LF - L        ROT - R        Rep ROT - R        ROT - L        Rep ROT - L        FLEX        Rep FLEX Produced pain R root of spine of scap area No worse   X     Other Tests: no pain with resisted shoulder extension or horizontal abduction.      Provisional Classification:  possible derangement  Principle of Management: will initiate retraction/extension of neck x 10 reps 3-4x/day as may refer to scapula.  Will also work on thoracic ROM and scapular stabilization exercises.     ANKLE:     PROM L PROM R AROM L AROM R MMT L MMT R   Dorsiflexion 9 12 WNL WNL 5 5   Plantarflexion 55 55 WNL WNL 5 5   Inversion   WNL WNL 5 5   Eversion   WNL WNL 5 with pain lateral 5   G Toe Ext         G Toe Flex           Edema:    General: nil    Palpation: minimally tender medial calcaneal tubercle and longitudinal arch, as well as peronues brevis tendon near 5th metatarsal and dorsal/lateral foot    Gait: mild limp if barefoot, no limp in shoes    System    Physical Exam    General     ROS    Assessment/Plan:    Patient is a 62 year old male with cervical/R scapula and left side ankle complaints.    Patient has the following significant findings with corresponding treatment plan.                Diagnosis 1:  R upper back pain (this is near the root of the spine of the R scapula, some suspicion that it is referred from the neck)    Pain -  hot/cold therapy, manual therapy and directional preference exercise  Decreased ROM/flexibility - manual therapy and therapeutic exercise  Decreased strength - therapeutic exercise and therapeutic activities  Decreased proprioception - neuro re-education and therapeutic activities  Decreased function - therapeutic activities  Impaired posture - neuro re-education  Diagnosis 2:  L plantar fascitis     Pain -  hot/cold therapy  Decreased ROM/flexibility - manual therapy and therapeutic exercise  Decreased strength - therapeutic exercise and therapeutic activities  Decreased proprioception - neuro re-education and therapeutic activities  Decreased function - therapeutic activities  Impaired posture - neuro re-education    Therapy Evaluation Codes:   1) History comprised of:   Personal factors that impact the plan of care:      None.    Comorbidity factors that impact the  plan of care are:      None.     Medications impacting care: None.  2) Examination of Body Systems comprised of:   Body structures and functions that impact the plan of care:      Cervical spine and foot.   Activity limitations that impact the plan of care are:      Driving, Running, Sports, Walking and reaching with the R arm.  3) Clinical presentation characteristics are:   Stable/Uncomplicated.  4) Decision-Making    Low complexity using standardized patient assessment instrument and/or measureable assessment of functional outcome.  Cumulative Therapy Evaluation is: Low complexity.    Previous and current functional limitations:  (See Goal Flow Sheet for this information)    Short term and Long term goals: (See Goal Flow Sheet for this information)     Communication ability:  Patient appears to be able to clearly communicate and understand verbal and written communication and follow directions correctly.  Treatment Explanation - The following has been discussed with the patient:   RX ordered/plan of care  Anticipated outcomes  Possible risks and side effects  This patient would benefit from PT intervention to resume normal activities.   Rehab potential is good.    Frequency:  1 X week, once daily  Duration:  for 6 weeks  Discharge Plan:  Achieve all LTG.  Independent in home treatment program.  Reach maximal therapeutic benefit.    Please refer to the daily flowsheet for treatment today, total treatment time and time spent performing 1:1 timed codes.

## 2018-03-19 ENCOUNTER — THERAPY VISIT (OUTPATIENT)
Dept: PHYSICAL THERAPY | Facility: CLINIC | Age: 63
End: 2018-03-19
Payer: COMMERCIAL

## 2018-03-19 ENCOUNTER — TELEPHONE (OUTPATIENT)
Dept: PHYSICAL THERAPY | Facility: CLINIC | Age: 63
End: 2018-03-19

## 2018-03-19 DIAGNOSIS — M54.2 CERVICALGIA: ICD-10-CM

## 2018-03-19 DIAGNOSIS — M89.8X1 PAIN OF RIGHT SCAPULA: ICD-10-CM

## 2018-03-19 DIAGNOSIS — M72.2 PLANTAR FASCIITIS OF LEFT FOOT: ICD-10-CM

## 2018-03-19 PROCEDURE — 97110 THERAPEUTIC EXERCISES: CPT | Mod: GP | Performed by: PHYSICAL THERAPIST

## 2018-03-19 PROCEDURE — 97140 MANUAL THERAPY 1/> REGIONS: CPT | Mod: GP | Performed by: PHYSICAL THERAPIST

## 2018-03-19 PROCEDURE — 97112 NEUROMUSCULAR REEDUCATION: CPT | Mod: GP | Performed by: PHYSICAL THERAPIST

## 2018-03-19 NOTE — TELEPHONE ENCOUNTER
"Dr Mullen,   Could you please 'sign' these PT orders to treat Christophe Del Rosario for diagnosis of \"plantar fascitis of the left foot\" in addition to the upper back pain orders you had previously placed.       Please let me know if there are further questions/concerns.     Thank you,   Blair Krishnamurthy, MYAT  [facility]  "

## 2018-03-20 ENCOUNTER — OFFICE VISIT (OUTPATIENT)
Dept: FAMILY MEDICINE | Facility: CLINIC | Age: 63
End: 2018-03-20

## 2018-03-20 VITALS
WEIGHT: 201.4 LBS | HEART RATE: 52 BPM | SYSTOLIC BLOOD PRESSURE: 122 MMHG | DIASTOLIC BLOOD PRESSURE: 78 MMHG | BODY MASS INDEX: 28.09 KG/M2 | RESPIRATION RATE: 16 BRPM

## 2018-03-20 DIAGNOSIS — M54.9 UPPER BACK PAIN ON RIGHT SIDE: Primary | ICD-10-CM

## 2018-03-20 PROCEDURE — 99213 OFFICE O/P EST LOW 20 MIN: CPT | Performed by: FAMILY MEDICINE

## 2018-03-20 NOTE — MR AVS SNAPSHOT
After Visit Summary   3/20/2018    Christophe Del Rosario    MRN: [id number]           Patient Information     Date Of Birth          1955        Visit Information        Provider Department      3/20/2018 9:15 AM Ten Haney MD [facility]        Today's Diagnoses     Upper back pain on right side    -  1       Follow-ups after your visit        Your next 10 appointments already scheduled     Mar 26, 2018  3:30 PM CDT   RANDA Spine with Blair Krishnamurthy PT   [facility] ([facility] [place]  )    [address]  [facility] [address]   [phone]            Apr 02, 2018  3:30 PM CDT   RANDA Spine with Blair Krishnamurthy PT   [facility] ([facility] [place]  )    [address]  [facility] [place] [facility] [address]   [phone]            Apr 09, 2018  8:00 AM CDT   LAB with BAE LAB   [facility] ([facility])    [address]  [place] [facility] [address]   [phone]           Please do not eat 10-12 hours before your appointment if you are coming in fasting for labs on lipids, cholesterol, or glucose (sugar). This does not apply to pregnant women. Water, hot tea and black coffee (with nothing added) are okay. Do not drink other fluids, diet soda or chew gum.              Who to contact     If you have questions or need follow up information about today's clinic visit or your schedule please contact [facility] directly at [phone].  Normal or non-critical lab and imaging results will be communicated to you by MyChart, letter or phone within 4 business days after the clinic has received the results. If you do not hear from us within 7 days, please contact the clinic through MyChart or phone. If you have a critical or abnormal lab result, we will notify you by phone as soon as possible.  Submit refill requests through Shoplinehart or  call your pharmacy and they will forward the refill request to us. Please allow 3 business days for your refill to be completed.          Additional Information About Your Visit        MyChart Information     VideoCarehart gives you secure access to your electronic health record. If you see a primary care provider, you can also send messages to your care team and make appointments. If you have questions, please call your primary care clinic.  If you do not have a primary care provider, please call [phone] and they will assist you.        Care EveryWhere ID     This is your Care EveryWhere ID. This could be used by other organizations to access your [place] medical records  [id number]        Your Vitals Were     Pulse Respirations BMI (Body Mass Index)             52 16 28.09 kg/m2          Blood Pressure from Last 3 Encounters:   03/20/18 122/78   01/26/18 138/78   01/12/18 122/74    Weight from Last 3 Encounters:   03/20/18 91.4 kg (201 lb 6.4 oz)   01/26/18 92.5 kg (204 lb)   01/12/18 90.7 kg (200 lb)              Today, you had the following     No orders found for display       Primary Care Provider Office Phone # Fax #    Jorge L Mullen [phone] [address]  [facility] [address]        Equal Access to Services     JOSEY ALEXANDER : Hadii aad ku hadasho Soomaali, waaxda luqadaha, qaybta kaalmada adeegyada, waxay idiin hayaan adelian kharacarolin mejia . So [facility] [phone].    ATENCIÓN: Si habla español, tiene a mckenzie disposición servicios gratuitos de asistencia lingüística. Llame al [phone].    We comply with applicable federal civil rights laws and [place] laws. We do not discriminate on the basis of race, color, national origin, age, disability, sex, sexual orientation, or gender identity.            Thank you!     Thank you for choosing [facility]  for your care. Our goal is always to provide you with excellent care. Hearing back from our patients is one  way we can continue to improve our services. Please take a few minutes to complete the written survey that you may receive in the mail after your visit with us. Thank you!             Your Updated Medication List - Protect others around you: Learn how to safely use, store and throw away your medicines at www.disposemymeds.org.          This list is accurate as of 3/20/18  4:20 PM.  Always use your most recent med list.                   Brand Name Dispense Instructions for use Diagnosis    aspirin 81 MG chewable tablet     108 tablet    Take 1 tablet (81 mg) by mouth daily    Benign essential hypertension       atorvastatin 80 MG tablet    LIPITOR    30 tablet    Take 1 tablet (80 mg) by mouth daily    Coronary artery disease involving native coronary artery of native heart without angina pectoris       lisinopril 20 MG tablet    PRINIVIL/ZESTRIL    90 tablet    Take 1 tablet (20 mg) by mouth daily    Encounter for medication refill       metoprolol succinate 50 MG 24 hr tablet    TOPROL-XL    90 tablet    TAKE 1 TABLET(50 MG) BY MOUTH DAILY    Benign essential hypertension

## 2018-03-20 NOTE — PROGRESS NOTES
"2 months of upper back pain. Woke up with this. It is along the spine, a bit to the right. No referred pain. Heat and cold helps, but it recurs. Occasionally goes away, but can be triggered by the \"wrong\" movement.  ROS: plantar fasciitis left  SH: exercises frequently.  /78  Pulse 52  Resp 16  Wt 91.4 kg (201 lb 6.4 oz)  BMI 28.09 kg/m2 NAD right rhomboid region with few trigger points, and reduced ROM of shoulders, not painful  (M54.9) Upper back pain on right side  (primary encounter diagnosis)  Comment:   Plan: continue PT, discussed [employer].        "

## 2018-03-26 ENCOUNTER — THERAPY VISIT (OUTPATIENT)
Dept: PHYSICAL THERAPY | Facility: CLINIC | Age: 63
End: 2018-03-26
Payer: COMMERCIAL

## 2018-03-26 DIAGNOSIS — M72.2 PLANTAR FASCIITIS OF LEFT FOOT: ICD-10-CM

## 2018-03-26 DIAGNOSIS — M54.2 CERVICALGIA: ICD-10-CM

## 2018-03-26 DIAGNOSIS — M89.8X1 PAIN OF RIGHT SCAPULA: ICD-10-CM

## 2018-03-26 PROCEDURE — 97110 THERAPEUTIC EXERCISES: CPT | Mod: GP | Performed by: PHYSICAL THERAPIST

## 2018-03-26 PROCEDURE — 97140 MANUAL THERAPY 1/> REGIONS: CPT | Mod: GP | Performed by: PHYSICAL THERAPIST

## 2018-04-02 ENCOUNTER — THERAPY VISIT (OUTPATIENT)
Dept: PHYSICAL THERAPY | Facility: CLINIC | Age: 63
End: 2018-04-02
Payer: COMMERCIAL

## 2018-04-02 DIAGNOSIS — M72.2 PLANTAR FASCIITIS OF LEFT FOOT: ICD-10-CM

## 2018-04-02 DIAGNOSIS — M89.8X1 PAIN OF RIGHT SCAPULA: ICD-10-CM

## 2018-04-02 DIAGNOSIS — M54.2 CERVICALGIA: ICD-10-CM

## 2018-04-02 PROCEDURE — 97110 THERAPEUTIC EXERCISES: CPT | Mod: GP | Performed by: PHYSICAL THERAPIST

## 2018-04-02 PROCEDURE — 97140 MANUAL THERAPY 1/> REGIONS: CPT | Mod: GP | Performed by: PHYSICAL THERAPIST

## 2018-04-06 DIAGNOSIS — E78.5 HLD (HYPERLIPIDEMIA): Primary | ICD-10-CM

## 2018-04-09 ENCOUNTER — THERAPY VISIT (OUTPATIENT)
Dept: PHYSICAL THERAPY | Facility: CLINIC | Age: 63
End: 2018-04-09
Payer: COMMERCIAL

## 2018-04-09 DIAGNOSIS — E78.5 HLD (HYPERLIPIDEMIA): ICD-10-CM

## 2018-04-09 DIAGNOSIS — M89.8X1 PAIN OF RIGHT SCAPULA: ICD-10-CM

## 2018-04-09 DIAGNOSIS — M54.2 CERVICALGIA: ICD-10-CM

## 2018-04-09 DIAGNOSIS — M72.2 PLANTAR FASCIITIS OF LEFT FOOT: ICD-10-CM

## 2018-04-09 LAB
ALT SERPL W P-5'-P-CCNC: <5 U/L (ref 5–30)
CHOLEST SERPL-MCNC: 123 MG/DL
HDLC SERPL-MCNC: 52 MG/DL
LDLC SERPL CALC-MCNC: 53 MG/DL
NONHDLC SERPL-MCNC: 71 MG/DL
TRIGL SERPL-MCNC: 88 MG/DL

## 2018-04-09 PROCEDURE — 80061 LIPID PANEL: CPT | Performed by: INTERNAL MEDICINE

## 2018-04-09 PROCEDURE — 97140 MANUAL THERAPY 1/> REGIONS: CPT | Mod: GP | Performed by: PHYSICAL THERAPIST

## 2018-04-09 PROCEDURE — 84460 ALANINE AMINO (ALT) (SGPT): CPT | Performed by: INTERNAL MEDICINE

## 2018-04-09 PROCEDURE — 36415 COLL VENOUS BLD VENIPUNCTURE: CPT | Performed by: INTERNAL MEDICINE

## 2018-04-09 PROCEDURE — 97110 THERAPEUTIC EXERCISES: CPT | Mod: GP | Performed by: PHYSICAL THERAPIST

## 2018-04-16 ENCOUNTER — THERAPY VISIT (OUTPATIENT)
Dept: PHYSICAL THERAPY | Facility: CLINIC | Age: 63
End: 2018-04-16
Payer: COMMERCIAL

## 2018-04-16 DIAGNOSIS — M54.2 CERVICALGIA: ICD-10-CM

## 2018-04-16 DIAGNOSIS — M89.8X1 PAIN OF RIGHT SCAPULA: ICD-10-CM

## 2018-04-16 DIAGNOSIS — M72.2 PLANTAR FASCIITIS OF LEFT FOOT: ICD-10-CM

## 2018-04-16 PROCEDURE — 97110 THERAPEUTIC EXERCISES: CPT | Mod: GP | Performed by: PHYSICAL THERAPIST

## 2018-04-16 PROCEDURE — 97140 MANUAL THERAPY 1/> REGIONS: CPT | Mod: GP | Performed by: PHYSICAL THERAPIST

## 2018-04-23 ENCOUNTER — THERAPY VISIT (OUTPATIENT)
Dept: PHYSICAL THERAPY | Facility: CLINIC | Age: 63
End: 2018-04-23
Payer: COMMERCIAL

## 2018-04-23 DIAGNOSIS — M54.2 CERVICALGIA: ICD-10-CM

## 2018-04-23 DIAGNOSIS — M72.2 PLANTAR FASCIITIS OF LEFT FOOT: ICD-10-CM

## 2018-04-23 DIAGNOSIS — M89.8X1 PAIN OF RIGHT SCAPULA: ICD-10-CM

## 2018-04-23 PROCEDURE — 97140 MANUAL THERAPY 1/> REGIONS: CPT | Mod: GP | Performed by: PHYSICAL THERAPIST

## 2018-04-23 PROCEDURE — 97110 THERAPEUTIC EXERCISES: CPT | Mod: GP | Performed by: PHYSICAL THERAPIST

## 2018-05-01 ENCOUNTER — THERAPY VISIT (OUTPATIENT)
Dept: PHYSICAL THERAPY | Facility: CLINIC | Age: 63
End: 2018-05-01
Payer: COMMERCIAL

## 2018-05-01 DIAGNOSIS — M89.8X1 PAIN OF RIGHT SCAPULA: ICD-10-CM

## 2018-05-01 DIAGNOSIS — M72.2 PLANTAR FASCIITIS OF LEFT FOOT: ICD-10-CM

## 2018-05-01 DIAGNOSIS — Z76.0 ENCOUNTER FOR MEDICATION REFILL: ICD-10-CM

## 2018-05-01 DIAGNOSIS — M54.2 CERVICALGIA: ICD-10-CM

## 2018-05-01 DIAGNOSIS — I10 BENIGN ESSENTIAL HYPERTENSION: ICD-10-CM

## 2018-05-01 PROCEDURE — 97110 THERAPEUTIC EXERCISES: CPT | Mod: GP | Performed by: PHYSICAL THERAPIST

## 2018-05-01 PROCEDURE — 97140 MANUAL THERAPY 1/> REGIONS: CPT | Mod: GP | Performed by: PHYSICAL THERAPIST

## 2018-05-01 RX ORDER — LISINOPRIL 10 MG/1
TABLET ORAL
Qty: 90 TABLET | Refills: 0 | Status: SHIPPED | OUTPATIENT
Start: 2018-05-01 | End: 2018-08-09 | Stop reason: DRUGHIGH

## 2018-05-01 RX ORDER — METOPROLOL SUCCINATE 50 MG/1
TABLET, EXTENDED RELEASE ORAL
Qty: 90 TABLET | Refills: 0 | Status: SHIPPED | OUTPATIENT
Start: 2018-05-01 | End: 2018-08-14

## 2018-05-01 NOTE — PROGRESS NOTES
Subjective:  HPI                    Objective:  System    Physical Exam    General     ROS    Assessment/Plan:    DISCHARGE REPORT    Progress reporting period is from 3/8/18 to 5/1/18 (8 visits).       SUBJECTIVE  Subjective changes noted by patient:  Feels upper back is 95% better and foot is overall better, but will just take some time.  Ran about 1.5 miles and foot did pretty well.  Feels good about his HEP and will be erich to transition to self management with HEP at this time.     Current Pain level: 2/10.     Initial Pain level: 5/10 (L foot varies 0-7/10).   Changes in function:  Yes (See Goal flowsheet attached for changes in current functional level)  Adverse reaction to treatment or activity: None    OBJECTIVE  Changes noted in objective findings:  Yes,   Objective:   Cervical AROM painfree.    Thoracic rotation AROM painfree.    Shoulder AROM full and painfree.    L foot with some tenderness to palpation in the calcaneal fat pad.  Still feel some increased adherence of tissue here.  Overall improved and less tender, but still palpable.      Has HEP for ankle ROM, plantar fascia stretching, foot/ankle strengthening.  Also, for cervical/thoracic ROM and scapular stabilization.     ASSESSMENT/PLAN  Updated problem list and treatment plan:   Diagnosis 1:  Neck/R scapula pain (medial to root of spine of scap on R)    Diagnosis 2:  L plantar fascitis, more of adherent tissue in calcaneal fat pad with irritation    Will move on to self management with HEP for any remaining pain, stiffness, weakness.  STG/LTGs have been met or progress has been made towards goals:  Yes (See Goal flow sheet completed today.)  Assessment of Progress: The patient's condition is improving.  Self Management Plans:  Patient has been instructed in a home treatment program.  I have re-evaluated this patient and find that the nature, scope, duration and intensity of the therapy is appropriate for the medical condition of the  patientElton Saeed continues to require the following intervention to meet STG and LTG's:  PT intervention is no longer required to meet STG/LTG.    Recommendations:  This patient is ready to be discharged from therapy and continue their home treatment program.    Please refer to the daily flowsheet for treatment today, total treatment time and time spent performing 1:1 timed codes.

## 2018-07-02 ENCOUNTER — OFFICE VISIT (OUTPATIENT)
Dept: FAMILY MEDICINE | Facility: CLINIC | Age: 63
End: 2018-07-02

## 2018-07-02 VITALS
TEMPERATURE: 98.3 F | RESPIRATION RATE: 16 BRPM | SYSTOLIC BLOOD PRESSURE: 114 MMHG | HEART RATE: 73 BPM | OXYGEN SATURATION: 95 % | DIASTOLIC BLOOD PRESSURE: 68 MMHG | BODY MASS INDEX: 28.31 KG/M2 | WEIGHT: 203 LBS

## 2018-07-02 DIAGNOSIS — R21 RASH: Primary | ICD-10-CM

## 2018-07-02 LAB
% GRANULOCYTES: 59.5 % (ref 42.2–75.2)
HCT VFR BLD AUTO: 45.9 % (ref 39–51)
HEMOGLOBIN: 15.3 G/DL (ref 13.4–17.5)
LYMPHOCYTES NFR BLD AUTO: 30.3 % (ref 20.5–51.1)
MCH RBC QN AUTO: 33.1 PG (ref 27–31)
MCHC RBC AUTO-ENTMCNC: 33.3 G/DL (ref 33–37)
MCV RBC AUTO: 99.4 FL (ref 80–100)
MONOCYTES NFR BLD AUTO: 10.2 % (ref 1.7–9.3)
PLATELET # BLD AUTO: 262 K/UL (ref 140–450)
RBC # BLD AUTO: 4.62 X10/CMM (ref 4.2–5.9)
WBC # BLD AUTO: 7.5 X10/CMM (ref 3.8–11)

## 2018-07-02 PROCEDURE — 85025 COMPLETE CBC W/AUTO DIFF WBC: CPT | Performed by: FAMILY MEDICINE

## 2018-07-02 PROCEDURE — 36415 COLL VENOUS BLD VENIPUNCTURE: CPT | Performed by: FAMILY MEDICINE

## 2018-07-02 PROCEDURE — 86618 LYME DISEASE ANTIBODY: CPT | Mod: 90 | Performed by: FAMILY MEDICINE

## 2018-07-02 PROCEDURE — 86666 EHRLICHIA ANTIBODY: CPT | Mod: 90 | Performed by: FAMILY MEDICINE

## 2018-07-02 PROCEDURE — 99213 OFFICE O/P EST LOW 20 MIN: CPT | Performed by: FAMILY MEDICINE

## 2018-07-02 RX ORDER — AMOXICILLIN 500 MG/1
500 CAPSULE ORAL 3 TIMES DAILY
Qty: 42 CAPSULE | Refills: 0 | Status: SHIPPED | OUTPATIENT
Start: 2018-07-02 | End: 2018-07-16

## 2018-07-02 NOTE — MR AVS SNAPSHOT
After Visit Summary   7/2/2018    Christophe Del Rosario    MRN: [id number]           Patient Information     Date Of Birth          1955        Visit Information        Provider Department      7/2/2018 1:30 PM Lou Hale MD [facility]        Today's Diagnoses     Rash    -  1      Care Instructions    loratidine (claritin) 10 mg daily for itching  Amoxicillin antibiotic  Labs today              Follow-ups after your visit        Your next 10 appointments already scheduled     Aug 02, 2018  7:40 AM CDT   LAB with BAE LAB   [facility] [place] ([facility])    [address]  [facility] [address]   [phone]           Please do not eat 10-12 hours before your appointment if you are coming in fasting for labs on lipids, cholesterol, or glucose (sugar). This does not apply to pregnant women. Water, hot tea and black coffee (with nothing added) are okay. Do not drink other fluids, diet soda or chew gum.            Aug 02, 2018  8:00 AM CDT   CT CHEST W/O & W CONTRAST with SCICT1   [facility] ([facility])    [address]  [address]  [facility] [address]   [phone]           Please bring any scans or X-rays taken at other hospitals, if similar tests were done. Also bring a list of your medicines, including vitamins, minerals and over-the-counter drugs. It is safest to leave personal items at home.  Be sure to tell your doctor:   If you have any allergies.   If there s any chance you are pregnant.   If you are breastfeeding.    If you have diabetes as your medication may need to be adjusted for this exam.  You will have contrast for this exam. To prepare:   Do not eat or drink for 2 hours before your exam. If you need to take medicine, you may take it with small sips of water. (We may ask you to take liquid medicine as well.)   The day before your  exam, drink extra fluids at least six 8-ounce glasses (unless your doctor tells you to restrict your fluids).  Patients over 70 or patients with diabetes or kidney problems:   If you haven t had a blood test (creatinine test) within the last 30 days, the Cardiologist/Radiologist may require you to get this test prior to your exam.  Please wear loose clothing, such as a sweat suit or jogging clothes. Avoid snaps, zippers and other metal. We may ask you to undress and put on a hospital gown.  If you have any questions, please call the Imaging Department where you will have your exam.            Aug 09, 2018  3:45 PM CDT   Return Visit with Reji Medellin MD   [facility] ([facility])    [address] [place] [address] [phone] OPT 2              Who to contact     If you have questions or need follow up information about today's clinic visit or your schedule please contact [facility] directly at [phone].  Normal or non-critical lab and imaging results will be communicated to you by Novindahart, letter or phone within 4 business days after the clinic has received the results. If you do not hear from us within 7 days, please contact the clinic through Sellfy or phone. If you have a critical or abnormal lab result, we will notify you by phone as soon as possible.  Submit refill requests through Sellfy or call your pharmacy and they will forward the refill request to us. Please allow 3 business days for your refill to be completed.          Additional Information About Your Visit        Sellfy Information     Sellfy gives you secure access to your electronic health record. If you see a primary care provider, you can also send messages to your care team and make appointments. If you have questions, please call your primary care clinic.  If you do not have a primary care provider, please call [phone] and they will  assist you.        Care EveryWhere ID     This is your Care EveryWhere ID. This could be used by other organizations to access your [place] medical records  [id number]        Your Vitals Were     Pulse Temperature Respirations Pulse Oximetry BMI (Body Mass Index)       73 98.3  F (36.8  C) 16 95% 28.31 kg/m2        Blood Pressure from Last 3 Encounters:   07/02/18 114/68   03/20/18 122/78   01/26/18 138/78    Weight from Last 3 Encounters:   07/02/18 92.1 kg (203 lb)   03/20/18 91.4 kg (201 lb 6.4 oz)   01/26/18 92.5 kg (204 lb)              We Performed the Following     CBC with Diff/Plt ([facility])     Human Gran Ehrlichiosis IgG ([facility])     Lyme  Total Ab Test/Reflex ([facility])          Today's Medication Changes          These changes are accurate as of 7/2/18  2:24 PM.  If you have any questions, ask your nurse or doctor.               Start taking these medicines.        Dose/Directions    amoxicillin 500 MG capsule   Commonly known as:  AMOXIL   Used for:  Rash   Started by:  Lou Hale MD        Dose:  500 mg   Take 1 capsule (500 mg) by mouth 3 times daily for 14 days   Quantity:  42 capsule   Refills:  0            Where to get your medicines      These medications were sent to [facility] [address] [place] [address] [address] [place] [address] [place] [address]     Phone:  [phone]     amoxicillin 500 MG capsule                Primary Care Provider Office Phone # Fax #    Jorge L Mullen [phone] [address]  [facility] [address]        Equal Access to Services     SHARON ALEXANDER AH: Hadii chrystal Molina, jade loomis, qajuan miguel kaalmada elieser, charo bertrand. So [facility] [phone].    ATENCIÓN: Si habla español, tiene a mckenzie disposición servicios gratuitos de asistencia lingüística. Llame al [phone].    We comply with applicable federal civil rights laws and [place]  laws. We do not discriminate on the basis of race, color, national origin, age, disability, sex, sexual orientation, or gender identity.            Thank you!     Thank you for choosing [facility]  for your care. Our goal is always to provide you with excellent care. Hearing back from our patients is one way we can continue to improve our services. Please take a few minutes to complete the written survey that you may receive in the mail after your visit with us. Thank you!             Your Updated Medication List - Protect others around you: Learn how to safely use, store and throw away your medicines at www.disposemymeds.org.          This list is accurate as of 7/2/18  2:24 PM.  Always use your most recent med list.                   Brand Name Dispense Instructions for use Diagnosis    amoxicillin 500 MG capsule    AMOXIL    42 capsule    Take 1 capsule (500 mg) by mouth 3 times daily for 14 days    Rash       aspirin 81 MG chewable tablet     108 tablet    Take 1 tablet (81 mg) by mouth daily    Benign essential hypertension       atorvastatin 80 MG tablet    LIPITOR    30 tablet    Take 1 tablet (80 mg) by mouth daily    Coronary artery disease involving native coronary artery of native heart without angina pectoris       * lisinopril 20 MG tablet    PRINIVIL/ZESTRIL    90 tablet    Take 1 tablet (20 mg) by mouth daily    Encounter for medication refill       * lisinopril 10 MG tablet    PRINIVIL/ZESTRIL    90 tablet    TAKE 1 TABLET BY MOUTH DAILY    Encounter for medication refill       metoprolol succinate 50 MG 24 hr tablet    TOPROL-XL    90 tablet    TAKE 1 TABLET BY MOUTH DAILY    Benign essential hypertension       * Notice:  This list has 2 medication(s) that are the same as other medications prescribed for you. Read the directions carefully, and ask your doctor or other care provider to review them with you.

## 2018-07-02 NOTE — PROGRESS NOTES
"  SUBJECTIVE:   Christophe Del Rosario is a 62 year old male who presents to clinic today for the following health issues:  White male    [place] 2 weeks ago  Back a week ago Saturday and was itching   Sunburn  Welts mostly on anterior chest with excoriations verses small central vesicle. No bautista crusting.  He was wondering about bed bugs. There is no \"breakfast, lunch, dinner\" sequential bit marks.  There is no suggestion of scabies.  He has some other benign skin tags.    Exposure: no  Products no  Did not see bugs or ticks  Left ankle bite before this happened on his chest down by his ankle sock. Nothing there today.  Others had bites by sock line too per his report  Put benadryl some help.   Itching more in the morning.   No oral antihisatmine  No other illness symptoms  No linear marking to suggest plant contact.     Differential including Tick related illnesses.       Rash  Onset: 2 weeks     Description:   Location: look like bites, red itchy  Character: blotchy, red  Itching (Pruritis): YES    Progression of Symptoms:  worsening    Accompanying Signs & Symptoms:  Fever: no   Body aches or joint pain: no   Sore throat symptoms: no   Recent cold symptoms: no     History:   Previous similar rash: no     Precipitating factors:   Exposure to similar rash: no   New exposures: Yes went fishing in in [place]   Recent travel: YES, [place]    Alleviating factors:  none    Therapies Tried and outcome: Benadryl, not really         Problem list and histories reviewed & adjusted, as indicated.  Additional history: as documented    Patient Active Problem List   Diagnosis     HTN (hypertension)     Health Care Home     Advance Care Planning     Hip pain, left     Essential hypertension     BARTOLO (obstructive sleep apnea)     Chest pain     Alcohol dependence (H)     No past surgical history on file.    Social History   Substance Use Topics     Smoking status: Former Smoker     Packs/day: 0.10     " Types: Cigarettes     Quit date: 4/2/2018     Smokeless tobacco: Never Used      Comment: 1 a day      Alcohol use 12.0 oz/week     20 Standard drinks or equivalent per week      Comment: 15-20 per week     Family History   Problem Relation Age of Onset     Coronary Artery Disease Father      Cerebrovascular Disease Brother 53     small stroke         Current Outpatient Prescriptions   Medication Sig Dispense Refill     amoxicillin (AMOXIL) 500 MG capsule Take 1 capsule (500 mg) by mouth 3 times daily for 14 days 42 capsule 0     aspirin 81 MG chewable tablet Take 1 tablet (81 mg) by mouth daily 108 tablet 3     atorvastatin (LIPITOR) 80 MG tablet Take 1 tablet (80 mg) by mouth daily 30 tablet 11     lisinopril (PRINIVIL/ZESTRIL) 10 MG tablet TAKE 1 TABLET BY MOUTH DAILY (Patient not taking: Reported on 7/2/2018) 90 tablet 0     lisinopril (PRINIVIL/ZESTRIL) 20 MG tablet Take 1 tablet (20 mg) by mouth daily 90 tablet 3     metoprolol succinate (TOPROL-XL) 50 MG 24 hr tablet TAKE 1 TABLET BY MOUTH DAILY 90 tablet 0     No Known Allergies  Recent Labs   Lab Test  04/09/18   0815  03/08/18   0814  03/08/18   0810  01/12/18   1004  10/31/16   0915   LDL  53  60   --   130*  122*   HDL  52  54   --   55  51   TRIG  88  79   --   145  61   ALT  <5*   --   <5*  16  22   CR   --    --    --   0.90  1.10   POTASSIUM   --    --    --   4.7  4.7      BP Readings from Last 3 Encounters:   07/02/18 114/68   03/20/18 122/78   01/26/18 138/78    Wt Readings from Last 3 Encounters:   07/02/18 92.1 kg (203 lb)   03/20/18 91.4 kg (201 lb 6.4 oz)   01/26/18 92.5 kg (204 lb)                  Labs reviewed in EPIC    Reviewed and updated as needed this visit by clinical staff       Reviewed and updated as needed this visit by Provider         ROS:  Constitutional, HEENT, cardiovascular, pulmonary, GI, , musculoskeletal, neuro, skin, endocrine and psych systems are negative, except as otherwise noted.    OBJECTIVE:     /68   "Pulse 73  Temp 98.3  F (36.8  C)  Resp 16  Wt 92.1 kg (203 lb)  SpO2 95%  BMI 28.31 kg/m2  Body mass index is 28.31 kg/(m^2).  GENERAL: healthy, alert and no distress  EYES: Eyes grossly normal to inspection, PERRL and conjunctivae and sclerae normal  HENT: ear canals and TM's normal, nose and mouth without ulcers or lesions  NECK: no adenopathy, no asymmetry, masses, or scars and thyroid normal to palpation  RESP: lungs clear to auscultation - no rales, rhonchi or wheezes  CV: regular rate and rhythm, normal S1 S2, no S3 or S4, no murmur, click or rub, no peripheral edema and peripheral pulses strong  ABDOMEN: soft, nontender, no hepatosplenomegaly, no masses and bowel sounds normal  MS: no gross musculoskeletal defects noted, no edema  SKIN: red raised anterior chest with small central vesicle vs excoriations. No bautista suggestion of super infection. No ankle lesion. No scabies. No burrows. No bed bug bites \"breakfast, lunch, dinner\".   NEURO: Normal strength and tone, mentation intact and speech normal  PSYCH: mentation appears normal, affect normal/bright  LYMPH: no cervical, supraclavicular, axillary, or inguinal adenopathy    Diagnostic Test Results:  Results for orders placed or performed in visit on 07/02/18   CBC with Diff/Plt ([facility])   Result Value Ref Range    WBC x10/cmm 7.5 3.8 - 11.0 x10/cmm    % Lymphocytes 30.3 20.5 - 51.1 %    % Monocytes 10.2 (A) 1.7 - 9.3 %    % Granulocytes 59.5 42.2 - 75.2 %    RBC x10/cmm 4.62 4.2 - 5.9 x10/cmm    Hemoglobin 15.3 13.4 - 17.5 g/dl    Hematocrit 45.9 39 - 51 %    MCV 99.4 80 - 100 fL    MCH 33.1 (A) 27.0 - 31.0 pg    MCHC 33.3 33.0 - 37.0 g/dL    Platelet Count 262 140 - 450 K/uL       ASSESSMENT/PLAN:     ASSESSMENT / PLAN:  (R21) Rash  (primary encounter diagnosis)  Comment: consider lyme- offered doxycycline, but he wanted to go with the Amoxicillin dosing.  Plan: CBC with Diff/Plt ([facility]), Human Gran         Ehrlichiosis IgG ([facility]), Lyme  Total Ab         " Test/Reflex ([facility]), amoxicillin (AMOXIL) 500        MG capsule                Patient Instructions   loratidine (claritin) 10 mg daily for itching  Amoxicillin antibiotic  Labs today          Lou Hale MD  [facility]

## 2018-07-11 LAB
LYME IGG/IGM AB: <0.91 ISR (ref 0–0.9)
Lab: NEGATIVE

## 2018-08-02 ENCOUNTER — HOSPITAL ENCOUNTER (OUTPATIENT)
Dept: CARDIOLOGY | Facility: CLINIC | Age: 63
Discharge: HOME | End: 2018-08-02
Attending: INTERNAL MEDICINE | Admitting: INTERNAL MEDICINE
Payer: COMMERCIAL

## 2018-08-02 DIAGNOSIS — R91.8 PULMONARY NODULES: ICD-10-CM

## 2018-08-02 DIAGNOSIS — I10 ESSENTIAL HYPERTENSION: ICD-10-CM

## 2018-08-02 DIAGNOSIS — I25.10 CORONARY ARTERY DISEASE INVOLVING NATIVE CORONARY ARTERY OF NATIVE HEART WITHOUT ANGINA PECTORIS: ICD-10-CM

## 2018-08-02 LAB
ALT SERPL W P-5'-P-CCNC: 7 U/L (ref 5–30)
ANION GAP SERPL CALCULATED.3IONS-SCNC: 12.3 MMOL/L (ref 6–17)
BUN SERPL-MCNC: 22 MG/DL (ref 7–30)
CALCIUM SERPL-MCNC: 9.3 MG/DL (ref 8.5–10.5)
CHLORIDE SERPL-SCNC: 103 MMOL/L (ref 98–107)
CHOLEST SERPL-MCNC: 140 MG/DL
CO2 SERPL-SCNC: 25 MMOL/L (ref 23–29)
CREAT SERPL-MCNC: 1.12 MG/DL (ref 0.7–1.3)
GFR SERPL CREATININE-BSD FRML MDRD: 66 ML/MIN/1.7M2
GLUCOSE SERPL-MCNC: 95 MG/DL (ref 70–105)
HDLC SERPL-MCNC: 54 MG/DL
LDLC SERPL CALC-MCNC: 56 MG/DL
NONHDLC SERPL-MCNC: 86 MG/DL
POTASSIUM SERPL-SCNC: 4.3 MMOL/L (ref 3.5–5.1)
SODIUM SERPL-SCNC: 136 MMOL/L (ref 136–145)
TRIGL SERPL-MCNC: 149 MG/DL

## 2018-08-02 PROCEDURE — 84460 ALANINE AMINO (ALT) (SGPT): CPT | Performed by: INTERNAL MEDICINE

## 2018-08-02 PROCEDURE — 80061 LIPID PANEL: CPT | Performed by: INTERNAL MEDICINE

## 2018-08-02 PROCEDURE — 71250 CT THORAX DX C-: CPT

## 2018-08-02 PROCEDURE — 80048 BASIC METABOLIC PNL TOTAL CA: CPT | Performed by: INTERNAL MEDICINE

## 2018-08-02 PROCEDURE — 36415 COLL VENOUS BLD VENIPUNCTURE: CPT | Performed by: INTERNAL MEDICINE

## 2018-08-09 ENCOUNTER — OFFICE VISIT (OUTPATIENT)
Dept: CARDIOLOGY | Facility: CLINIC | Age: 63
End: 2018-08-09
Attending: INTERNAL MEDICINE
Payer: COMMERCIAL

## 2018-08-09 VITALS
SYSTOLIC BLOOD PRESSURE: 118 MMHG | HEART RATE: 62 BPM | WEIGHT: 196.5 LBS | BODY MASS INDEX: 27.51 KG/M2 | DIASTOLIC BLOOD PRESSURE: 76 MMHG | HEIGHT: 71 IN

## 2018-08-09 DIAGNOSIS — I10 ESSENTIAL HYPERTENSION: ICD-10-CM

## 2018-08-09 DIAGNOSIS — R91.8 PULMONARY NODULES: Primary | ICD-10-CM

## 2018-08-09 DIAGNOSIS — I49.3 PVC'S (PREMATURE VENTRICULAR CONTRACTIONS): ICD-10-CM

## 2018-08-09 DIAGNOSIS — I25.10 CORONARY ARTERY DISEASE INVOLVING NATIVE CORONARY ARTERY OF NATIVE HEART WITHOUT ANGINA PECTORIS: ICD-10-CM

## 2018-08-09 DIAGNOSIS — Z82.49 FAMILY HISTORY OF ISCHEMIC HEART DISEASE: ICD-10-CM

## 2018-08-09 PROCEDURE — 99213 OFFICE O/P EST LOW 20 MIN: CPT | Performed by: INTERNAL MEDICINE

## 2018-08-09 NOTE — LETTER
8/9/2018      Jorge L Mullen MD  [address]  [facility] [address]      RE: Christophe Del Rosario       Dear Colleague,    I had the pleasure of seeing Christophe Del Rosario in the [facility].    Service Date: 08/09/2018      REFERRING PHYSICIAN:  Dr. Jorge L Mullen.      HISTORY OF PRESENT ILLNESS:  It is my pleasure to see your patient, Christophe Del Rosario, in followup.  As you know, this is a gentleman who had a high calcium score.  Fortunately, stress echocardiography did not show any evidence of ischemia.  He has a history of hypertension and sleep apnea and unfortunately is a smoker.  He was also found to have pulmonary nodules on the calcium score CT scan.      With that background in mind, he is doing relatively well.  We did repeat the CT scan, and this shows that the nodules are stable.  He will need another CT scan in 1 year's time, which I will arrange.  He is having no chest pains or chest pressure, apart from the fact that he slipped going up his stairs and landed on his chest, and he does have inspiratory chest discomfort which sounds distinctly musculoskeletal.  His lipid profile today was excellent with an LDL of 56, HDL of 54 and triglycerides of 149, with a total cholesterol of 140.  His liver function tests are normal with an ALT of 7.  Blood pressure is excellent today at 118.  His pulse rate is normal at 62.      With respect to smoking, he had stopped smoking for 90 days but then went on a fishing trip to [place] and started smoking again.  He is now smoking 2-3 cigarettes a day, but he is aiming to stop these entirely.      We did have a long discussion about the mechanism of acute coronary syndromes and the use of statins to prevent plaque rupture and the reduction in cardiac events with aspirin, lisinopril and metoprolol.      IMPRESSION:   1.  Coronary artery disease.  The patient is asymptomatic with respect to coronary artery disease with no symptoms  suggestive of angina pectoris.   2.  Excellent lipid profile, well within secondary prevention guidelines.   3.  Normotensive.   4.  Tobacco abuse.      PLAN:   1.  We will continue the patient on his present medications.   2.  The patient will make valiant efforts to try to stop smoking cigarettes forever.  I will repeat the CT scan without contrast in 1 year's time.  I will see him back again in 1 year's time.        It has been my pleasure being involved in the care of this extremely nice patient.  I look forward to seeing him again.      cc:   Jorge L Mullen MD    [facility] [address] [place]  [address]         REJI CALL MD, [facility]             D: 2018   T: 2018   MT: MAVERICK      Name:     CHRYSTAL JACKSON   MRN:      [id number]        Account:      [id number]   :      1955           Service Date: 2018      Document: [id number]         Outpatient Encounter Prescriptions as of 2018   Medication Sig Dispense Refill     aspirin 81 MG chewable tablet Take 1 tablet (81 mg) by mouth daily 108 tablet 3     atorvastatin (LIPITOR) 80 MG tablet Take 1 tablet (80 mg) by mouth daily 30 tablet 11     lisinopril (PRINIVIL/ZESTRIL) 20 MG tablet Take 1 tablet (20 mg) by mouth daily 90 tablet 3     metoprolol succinate (TOPROL-XL) 50 MG 24 hr tablet TAKE 1 TABLET BY MOUTH DAILY 90 tablet 0     [DISCONTINUED] lisinopril (PRINIVIL/ZESTRIL) 10 MG tablet TAKE 1 TABLET BY MOUTH DAILY (Patient not taking: Reported on 2018) 90 tablet 0     No facility-administered encounter medications on file as of 2018.        Again, thank you for allowing me to participate in the care of your patient.      Sincerely,    Reji Medellin MD, MD     [facility]

## 2018-08-09 NOTE — PROGRESS NOTES
HPI and Plan:   See dictation    Orders Placed This Encounter   Procedures     CT Chest w/o Contrast     Basic metabolic panel     Lipid Profile     ALT     Follow-Up with Cardiologist       No orders of the defined types were placed in this encounter.      Medications Discontinued During This Encounter   Medication Reason     lisinopril (PRINIVIL/ZESTRIL) 10 MG tablet Dose adjustment         Encounter Diagnoses   Name Primary?     Essential hypertension      PVC's (premature ventricular contractions)      Family history of ischemic heart disease      Coronary artery disease involving native coronary artery of native heart without angina pectoris      Pulmonary nodules Yes       CURRENT MEDICATIONS:  Current Outpatient Prescriptions   Medication Sig Dispense Refill     aspirin 81 MG chewable tablet Take 1 tablet (81 mg) by mouth daily 108 tablet 3     atorvastatin (LIPITOR) 80 MG tablet Take 1 tablet (80 mg) by mouth daily 30 tablet 11     lisinopril (PRINIVIL/ZESTRIL) 20 MG tablet Take 1 tablet (20 mg) by mouth daily 90 tablet 3     metoprolol succinate (TOPROL-XL) 50 MG 24 hr tablet TAKE 1 TABLET BY MOUTH DAILY 90 tablet 0     [DISCONTINUED] lisinopril (PRINIVIL/ZESTRIL) 10 MG tablet TAKE 1 TABLET BY MOUTH DAILY (Patient not taking: Reported on 7/2/2018) 90 tablet 0       ALLERGIES   No Known Allergies    PAST MEDICAL HISTORY:  Past Medical History:   Diagnosis Date     Chest pain      HTN (hypertension) 2/27/2012       PAST SURGICAL HISTORY:  History reviewed. No pertinent surgical history.    FAMILY HISTORY:  Family History   Problem Relation Age of Onset     Coronary Artery Disease Father      Cerebrovascular Disease Brother 53     small stroke       SOCIAL HISTORY:  Social History     Social History     Marital status:      Spouse name: Flory in 1996     Number of children: N/A     Years of education: N/A     Occupational History      [employer]     Social History Main Topics     Smoking  "status: Current Every Day Smoker     Packs/day: 0.10     Types: Cigarettes     Smokeless tobacco: Never Used      Comment: 1 a day      Alcohol use 12.0 oz/week     20 Standard drinks or equivalent per week      Comment: 15-20 per week     Drug use: No     Sexual activity: Yes     Partners: Female     Other Topics Concern     None     Social History Narrative       Review of Systems:  Skin:  Negative       Eyes:  Positive for glasses    ENT:  Negative      Respiratory:  Negative       Cardiovascular:  Negative Positive for;lightheadedness    Gastroenterology: Negative      Genitourinary:  Negative      Musculoskeletal:  Negative      Neurologic:  Negative      Psychiatric:  Negative      Heme/Lymph/Imm:  Negative      Endocrine:  Negative        Physical Exam:  Vitals: /76  Pulse 62  Ht 1.803 m (5' 10.98\")  Wt 89.1 kg (196 lb 8 oz)  BMI 27.42 kg/m2    Constitutional:  cooperative, alert and oriented, well developed, well nourished, in no acute distress        Skin:  warm and dry to the touch, no apparent skin lesions or masses noted          Head:  normocephalic, no masses or lesions        Eyes:  pupils equal and round, conjunctivae and lids unremarkable, sclera white, no xanthalasma, EOMS intact, no nystagmus        Lymph:No Cervical lymphadenopathy present     ENT:  no pallor or cyanosis, dentition good        Neck:  carotid pulses are full and equal bilaterally, JVP normal, no carotid bruit        Respiratory:  normal breath sounds, clear to auscultation, normal A-P diameter, normal symmetry, normal respiratory excursion, no use of accessory muscles         Cardiac: regular rhythm, normal S1/S2, no S3 or S4, apical impulse not displaced, no murmurs, gallops or rubs occasional premature beats                                                       GI:  abdomen soft, non-tender, BS normoactive, no mass, no HSM, no bruits        Extremities and Muscular Skeletal:  no deformities, clubbing, cyanosis, " erythema observed;no edema              Neurological:  no gross motor deficits;affect appropriate        Psych:  Alert and Oriented x 3        CC  Reji Medellin MD  [address]  [place] [id number]

## 2018-08-09 NOTE — LETTER
8/9/2018    Jorge L Mullen MD  [address]  [facility] [address]    RE: Christophe Del Rosario       Dear Colleague,    I had the pleasure of seeing Christophe Del Rosario in the [facility].    HPI and Plan:   See dictation    Orders Placed This Encounter   Procedures     CT Chest w/o Contrast     Basic metabolic panel     Lipid Profile     ALT     Follow-Up with Cardiologist       No orders of the defined types were placed in this encounter.      Medications Discontinued During This Encounter   Medication Reason     lisinopril (PRINIVIL/ZESTRIL) 10 MG tablet Dose adjustment         Encounter Diagnoses   Name Primary?     Essential hypertension      PVC's (premature ventricular contractions)      Family history of ischemic heart disease      Coronary artery disease involving native coronary artery of native heart without angina pectoris      Pulmonary nodules Yes       CURRENT MEDICATIONS:  Current Outpatient Prescriptions   Medication Sig Dispense Refill     aspirin 81 MG chewable tablet Take 1 tablet (81 mg) by mouth daily 108 tablet 3     atorvastatin (LIPITOR) 80 MG tablet Take 1 tablet (80 mg) by mouth daily 30 tablet 11     lisinopril (PRINIVIL/ZESTRIL) 20 MG tablet Take 1 tablet (20 mg) by mouth daily 90 tablet 3     metoprolol succinate (TOPROL-XL) 50 MG 24 hr tablet TAKE 1 TABLET BY MOUTH DAILY 90 tablet 0     [DISCONTINUED] lisinopril (PRINIVIL/ZESTRIL) 10 MG tablet TAKE 1 TABLET BY MOUTH DAILY (Patient not taking: Reported on 7/2/2018) 90 tablet 0       ALLERGIES   No Known Allergies    PAST MEDICAL HISTORY:  Past Medical History:   Diagnosis Date     Chest pain      HTN (hypertension) 2/27/2012       PAST SURGICAL HISTORY:  History reviewed. No pertinent surgical history.    FAMILY HISTORY:  Family History   Problem Relation Age of Onset     Coronary Artery Disease Father      Cerebrovascular Disease Brother 53     small stroke       SOCIAL HISTORY:  Social History  "    Social History     Marital status:      Spouse name: Flory in 1996     Number of children: N/A     Years of education: N/A     Occupational History      [employer]     Social History Main Topics     Smoking status: Current Every Day Smoker     Packs/day: 0.10     Types: Cigarettes     Smokeless tobacco: Never Used      Comment: 1 a day      Alcohol use 12.0 oz/week     20 Standard drinks or equivalent per week      Comment: 15-20 per week     Drug use: No     Sexual activity: Yes     Partners: Female     Other Topics Concern     None     Social History Narrative       Review of Systems:  Skin:  Negative       Eyes:  Positive for glasses    ENT:  Negative      Respiratory:  Negative       Cardiovascular:  Negative Positive for;lightheadedness    Gastroenterology: Negative      Genitourinary:  Negative      Musculoskeletal:  Negative      Neurologic:  Negative      Psychiatric:  Negative      Heme/Lymph/Imm:  Negative      Endocrine:  Negative        Physical Exam:  Vitals: /76  Pulse 62  Ht 1.803 m (5' 10.98\")  Wt 89.1 kg (196 lb 8 oz)  BMI 27.42 kg/m2    Constitutional:  cooperative, alert and oriented, well developed, well nourished, in no acute distress        Skin:  warm and dry to the touch, no apparent skin lesions or masses noted          Head:  normocephalic, no masses or lesions        Eyes:  pupils equal and round, conjunctivae and lids unremarkable, sclera white, no xanthalasma, EOMS intact, no nystagmus        Lymph:No Cervical lymphadenopathy present     ENT:  no pallor or cyanosis, dentition good        Neck:  carotid pulses are full and equal bilaterally, JVP normal, no carotid bruit        Respiratory:  normal breath sounds, clear to auscultation, normal A-P diameter, normal symmetry, normal respiratory excursion, no use of accessory muscles         Cardiac: regular rhythm, normal S1/S2, no S3 or S4, apical impulse not displaced, no murmurs, gallops or rubs occasional " premature beats                                                       GI:  abdomen soft, non-tender, BS normoactive, no mass, no HSM, no bruits        Extremities and Muscular Skeletal:  no deformities, clubbing, cyanosis, erythema observed;no edema              Neurological:  no gross motor deficits;affect appropriate        Psych:  Alert and Oriented x 3        CC  Reji Medellin MD  [address]  [place] [id number]                Thank you for allowing me to participate in the care of your patient.      Sincerely,     Reji Medellin MD, MD     [facility]    cc:   Reji Medellin MD  [address]  [place] [id number]

## 2018-08-09 NOTE — MR AVS SNAPSHOT
After Visit Summary   8/9/2018    Christophe Del Rosario    MRN: [id number]           Patient Information     Date Of Birth          1955        Visit Information        Provider Department      8/9/2018 3:45 PM Reji Medellin MD [facility]        Today's Diagnoses     Pulmonary nodules    -  1    Essential hypertension        PVC's (premature ventricular contractions)        Family history of ischemic heart disease        Coronary artery disease involving native coronary artery of native heart without angina pectoris           Follow-ups after your visit        Additional Services     Follow-Up with Cardiologist                 Future tests that were ordered for you today     Open Future Orders        Priority Expected Expires Ordered    CT Chest w/o Contrast Routine 8/8/2019 8/9/2019 8/9/2018    Basic metabolic panel Routine 8/9/2019 8/10/2019 8/9/2018    Lipid Profile Routine 8/9/2019 8/9/2019 8/9/2018    ALT Routine 8/9/2019 8/9/2019 8/9/2018    Follow-Up with Cardiologist Routine 8/9/2019 8/10/2019 8/9/2018            Who to contact     If you have questions or need follow up information about today's clinic visit or your schedule please contact [facility] directly at [phone].  Normal or non-critical lab and imaging results will be communicated to you by [employer], letter or phone within 4 business days after the clinic has received the results. If you do not hear from us within 7 days, please contact the clinic through [employer] or phone. If you have a critical or abnormal lab result, we will notify you by phone as soon as possible.  Submit refill requests through [employer] or call your pharmacy and they will forward the refill request to us. Please allow 3 business days for your refill to be completed.          Additional Information About Your Visit        [employer] Information     [employer] gives you secure  "access to your electronic health record. If you see a primary care provider, you can also send messages to your care team and make appointments. If you have questions, please call your primary care clinic.  If you do not have a primary care provider, please call [phone] and they will assist you.        Care EveryWhere ID     This is your Care EveryWhere ID. This could be used by other organizations to access your [facility] medical records  [id number]        Your Vitals Were     Pulse Height BMI (Body Mass Index)             62 1.803 m (5' 10.98\") 27.42 kg/m2          Blood Pressure from Last 3 Encounters:   08/09/18 118/76   07/02/18 114/68   03/20/18 122/78    Weight from Last 3 Encounters:   08/09/18 89.1 kg (196 lb 8 oz)   07/02/18 92.1 kg (203 lb)   03/20/18 91.4 kg (201 lb 6.4 oz)              We Performed the Following     Follow-Up with Cardiologist          Today's Medication Changes          These changes are accurate as of 8/9/18  4:16 PM.  If you have any questions, ask your nurse or doctor.               These medicines have changed or have updated prescriptions.        Dose/Directions    lisinopril 20 MG tablet   Commonly known as:  PRINIVIL/ZESTRIL   This may have changed:  Another medication with the same name was removed. Continue taking this medication, and follow the directions you see here.   Used for:  Encounter for medication refill   Changed by:  Reji Medellin MD        Dose:  20 mg   Take 1 tablet (20 mg) by mouth daily   Quantity:  90 tablet   Refills:  3                Primary Care Provider Office Phone # Fax #    Jorge L Mullen [phone] [id number]  [facility] [address]        Equal Access to Services     SHARON ALEXANDER : Brnet Molina, jade loomis, haja omn, charo bertrand. So [facility] [phone].    ATENCIÓN: Si habla español, tiene a mckenzie disposición servicios gratuitos " de asistencia lingüística. Clara bethea [phone].    We comply with applicable federal civil rights laws and [place] laws. We do not discriminate on the basis of race, color, national origin, age, disability, sex, sexual orientation, or gender identity.            Thank you!     Thank you for choosing [facility]  for your care. Our goal is always to provide you with excellent care. Hearing back from our patients is one way we can continue to improve our services. Please take a few minutes to complete the written survey that you may receive in the mail after your visit with us. Thank you!             Your Updated Medication List - Protect others around you: Learn how to safely use, store and throw away your medicines at www.disposemymeds.org.          This list is accurate as of 8/9/18  4:16 PM.  Always use your most recent med list.                   Brand Name Dispense Instructions for use Diagnosis    aspirin 81 MG chewable tablet     108 tablet    Take 1 tablet (81 mg) by mouth daily    Benign essential hypertension       atorvastatin 80 MG tablet    LIPITOR    30 tablet    Take 1 tablet (80 mg) by mouth daily    Coronary artery disease involving native coronary artery of native heart without angina pectoris       lisinopril 20 MG tablet    PRINIVIL/ZESTRIL    90 tablet    Take 1 tablet (20 mg) by mouth daily    Encounter for medication refill       metoprolol succinate 50 MG 24 hr tablet    TOPROL-XL    90 tablet    TAKE 1 TABLET BY MOUTH DAILY    Benign essential hypertension

## 2018-08-10 NOTE — PROGRESS NOTES
Service Date: 08/09/2018      REFERRING PHYSICIAN:  Dr. Jorge L Mullen.      HISTORY OF PRESENT ILLNESS:  It is my pleasure to see your patient, Christophe Del Rosario, in followup.  As you know, this is a gentleman who had a high calcium score.  Fortunately, stress echocardiography did not show any evidence of ischemia.  He has a history of hypertension and sleep apnea and unfortunately is a smoker.  He was also found to have pulmonary nodules on the calcium score CT scan.      With that background in mind, he is doing relatively well.  We did repeat the CT scan, and this shows that the nodules are stable.  He will need another CT scan in 1 year's time, which I will arrange.  He is having no chest pains or chest pressure, apart from the fact that he slipped going up his stairs and landed on his chest, and he does have inspiratory chest discomfort which sounds distinctly musculoskeletal.  His lipid profile today was excellent with an LDL of 56, HDL of 54 and triglycerides of 149, with a total cholesterol of 140.  His liver function tests are normal with an ALT of 7.  Blood pressure is excellent today at 118.  His pulse rate is normal at 62.      With respect to smoking, he had stopped smoking for 90 days but then went on a fishing trip to [place] and started smoking again.  He is now smoking 2-3 cigarettes a day, but he is aiming to stop these entirely.      We did have a long discussion about the mechanism of acute coronary syndromes and the use of statins to prevent plaque rupture and the reduction in cardiac events with aspirin, lisinopril and metoprolol.      IMPRESSION:   1.  Coronary artery disease.  The patient is asymptomatic with respect to coronary artery disease with no symptoms suggestive of angina pectoris.   2.  Excellent lipid profile, well within secondary prevention guidelines.   3.  Normotensive.   4.  Tobacco abuse.      PLAN:   1.  We will continue the patient on his present medications.   2.  The patient  will make valiant efforts to try to stop smoking cigarettes forever.  I will repeat the CT scan without contrast in 1 year's time.  I will see him back again in 1 year's time.        It has been my pleasure being involved in the care of this extremely nice patient.  I look forward to seeing him again.      cc:   Jorge L Mullen MD    [facility] [address] [place] [address]         DON CALL MD, [facility]             D: 2018   T: 2018   MT: MAVERICK      Name:     CHRYSTAL JACKSON   MRN:      [id number]        Account:      [id number]   :      1955           Service Date: 2018      Document: [id number]

## 2018-08-14 DIAGNOSIS — I10 BENIGN ESSENTIAL HYPERTENSION: ICD-10-CM

## 2018-08-14 RX ORDER — METOPROLOL SUCCINATE 50 MG/1
50 TABLET, EXTENDED RELEASE ORAL DAILY
Qty: 90 TABLET | Refills: 0 | Status: SHIPPED | OUTPATIENT
Start: 2018-08-14 | End: 2018-11-18

## 2018-09-13 ENCOUNTER — NURSE TRIAGE (OUTPATIENT)
Dept: NURSING | Facility: CLINIC | Age: 63
End: 2018-09-13

## 2018-09-14 ENCOUNTER — TELEPHONE (OUTPATIENT)
Dept: FAMILY MEDICINE | Facility: CLINIC | Age: 63
End: 2018-09-14

## 2018-09-14 NOTE — TELEPHONE ENCOUNTER
Wife calls and says patient's blood pressure is 88/46 and is lying down because he feels light headed.  Wife says his blood pressure has been dropping in the eveing and then will go back up.  Wife says patient took his blood pressure medication in the afternoon.  Wife thinks blood pressure machine might be off so will go grab the neighbor's to recheck.  Reviewed guideline and care advice with caller. Caller verbalizes understanding.      Reason for Disposition    [1] Systolic BP < 90 AND [2] dizzy, lightheaded, or weak    Additional Information    Negative: Started suddenly after an allergic medicine, an allergic food, or bee sting    Negative: Shock suspected (e.g., cold/pale/clammy skin, too weak to stand, low BP, rapid pulse)    Negative: Difficult to awaken or acting confused  (e.g., disoriented, slurred speech)    Negative: Fainted    Protocols used: LOW BLOOD PRESSURE-ADULT-AH

## 2018-09-14 NOTE — TELEPHONE ENCOUNTER
Wife calls reporting patient has been feeling lightheaded in evenings lately, especially when stands. Last evening checked his BP while feeling this way and was 89/50 with HR=62. Monitored BP through the night and started improving around 1 am.   Takes metoprolol ER 50mg QD and lisinopril 20mg QD. Took these in afternoon yesterday.   This am has not taken meds and BP is 120/81.   Wife asks if meds should be adjusted. Patient has made lifestyle changes that may account for his lower BP -- stopped smoking nearly 1ppd for while, but now smoking 3-4 cigs per day; exercising more, etc.   Last visit with MD regarding BP was with Dr. Mullen in 1/2018.   Saw cardiology 1/2018 and started on atorvastatin then due to CAD based on calcium score and multiple other risk factors.   Plan: per Dr. Mullen -- advised hold lisinopril, continue metoprolol. Monitor BP and follow up with MD in 1-2 weeks for BP med visit. Call sooner if symptoms not resolved with med change.  Lili Paulino RN

## 2018-11-09 ENCOUNTER — TELEPHONE (OUTPATIENT)
Dept: FAMILY MEDICINE | Facility: CLINIC | Age: 63
End: 2018-11-09

## 2018-11-09 DIAGNOSIS — M72.2 PLANTAR FASCIITIS: Primary | ICD-10-CM

## 2018-11-09 NOTE — TELEPHONE ENCOUNTER
Patient called requesting referral for physical therapy to [facility] for plantar fascitis.  Per Dr. Sebas Alegre for this referral-it is entered.  Maryan Rodriguez

## 2018-11-18 DIAGNOSIS — I10 BENIGN ESSENTIAL HYPERTENSION: ICD-10-CM

## 2018-11-18 RX ORDER — METOPROLOL SUCCINATE 50 MG/1
TABLET, EXTENDED RELEASE ORAL
Qty: 90 TABLET | Refills: 0 | Status: SHIPPED | OUTPATIENT
Start: 2018-11-18 | End: 2019-02-19

## 2018-11-21 ENCOUNTER — THERAPY VISIT (OUTPATIENT)
Dept: PHYSICAL THERAPY | Facility: CLINIC | Age: 63
End: 2018-11-21
Payer: COMMERCIAL

## 2018-11-21 DIAGNOSIS — M72.2 PLANTAR FASCIITIS: ICD-10-CM

## 2018-11-21 DIAGNOSIS — Z76.0 ENCOUNTER FOR MEDICATION REFILL: ICD-10-CM

## 2018-11-21 PROCEDURE — 97161 PT EVAL LOW COMPLEX 20 MIN: CPT | Mod: GP | Performed by: PHYSICAL THERAPIST

## 2018-11-21 PROCEDURE — 97110 THERAPEUTIC EXERCISES: CPT | Mod: GP | Performed by: PHYSICAL THERAPIST

## 2018-11-21 NOTE — TELEPHONE ENCOUNTER
lisinopril---last seen 7/2/18 (not related), has lipid done on 8/2/18 with cardiology and last realted appointment with [facility] on 1/12/18

## 2018-11-21 NOTE — PROGRESS NOTES
[facility] Initial Evaluation  Subjective:  Christophe Del Rosario is a 63 year old male.    Patient s chief complaints: R foot/heel pain.    Condition occurred due to ran 3 miles after not having been running.  Date of Onset: about 10/21/18  Location of symptoms is calcaneus, plantar fascia/arch, sometimes on the sides (varies a bit) .  Symptoms other than pain include: denies symptoms other than pain   Quality of pain is aching/sharp and frequency is intermittent.    Pain dependence on time of day is: worse in morning.   Pain rating is: 4/10.    Symptoms are exacerbated by: running, walking prolonged, stairs, (getting out of bed).    Symptoms are relieved by:  Stretches, massaging.    Progression of symptoms is that symptoms are:  Improving last few days.  Imaging/Special tests include: none   Previous treatments include: none for this side, had PT for L side months ago with benefit.   Patient reports that general health is: excellent.   Pertinent medical history includes:  Heart problems, HBP.    Medical allergies includes: none.   Surgical history includes: none.  Current medications include: HBP, statins  Current occupation is: retired, apartment building maintenance/owner  Work status is: retired  Primary job tasks include: none (works on home, shop, apartments)  Barriers include: none  Red flags include: none    Patient's expectations for therapy include: decrease pain, return to running    HPI                    Objective:  ANKLE:     PROM L PROM R AROM L AROM R MMT L MMT R   Dorsiflexion 15 10 WNL WNL 5 5   Plantarflexion 50 50 WNL WNL 5 5   Inversion   WNL WNL 5 5, trace pain medial plantar arch/band   Eversion   WNL  WNL trace dorsal foot discomfor 5 5   G Toe Ext 55 55       G Toe Flex           Edema:    General: nil    Palpation: tender R calcaneal fat pad, R medial calcaneal tubercle and R medial longitudinal arch.     Gait: non antalgic with walking gait today    Special Tests: not  tested      System    Physical Exam    General     ROS    Assessment/Plan:    Patient is a 63 year old male with right side foot/ankle complaints.    Patient has the following significant findings with corresponding treatment plan.                Diagnosis 1:  R plantar fascitis, some R calcaneal fat pad irritation as well    Pain -  hot/cold therapy, US and manual therapy  Decreased ROM/flexibility - manual therapy and therapeutic exercise  Decreased strength - therapeutic exercise and therapeutic activities  Decreased proprioception - neuro re-education and therapeutic activities  Impaired gait - gait training  Decreased function - therapeutic activities    Therapy Evaluation Codes:   1) History comprised of:   Personal factors that impact the plan of care:      None.    Comorbidity factors that impact the plan of care are:      None.     Medications impacting care: None.  2) Examination of Body Systems comprised of:   Body structures and functions that impact the plan of care:      Ankle and foot.   Activity limitations that impact the plan of care are:      Running, Sports, Squatting/kneeling, Stairs and Walking.  3) Clinical presentation characteristics are:   Stable/Uncomplicated.  4) Decision-Making    Low complexity using standardized patient assessment instrument and/or measureable assessment of functional outcome.  Cumulative Therapy Evaluation is: Low complexity.    Previous and current functional limitations:  (See Goal Flow Sheet for this information)    Short term and Long term goals: (See Goal Flow Sheet for this information)     Communication ability:  Patient appears to be able to clearly communicate and understand verbal and written communication and follow directions correctly.  Treatment Explanation - The following has been discussed with the patient:   RX ordered/plan of care  Anticipated outcomes  Possible risks and side effects  This patient would benefit from PT intervention to resume normal  activities.   Rehab potential is good.    Frequency:  1 X week, once daily  Duration:  for 6 weeks  Discharge Plan:  Achieve all LTG.  Independent in home treatment program.  Reach maximal therapeutic benefit.    Please refer to the daily flowsheet for treatment today, total treatment time and time spent performing 1:1 timed codes.

## 2018-11-25 RX ORDER — LISINOPRIL 20 MG/1
20 TABLET ORAL DAILY
Qty: 90 TABLET | Refills: 3 | Status: SHIPPED | OUTPATIENT
Start: 2018-11-25 | End: 2019-02-19

## 2018-11-28 ENCOUNTER — THERAPY VISIT (OUTPATIENT)
Dept: PHYSICAL THERAPY | Facility: CLINIC | Age: 63
End: 2018-11-28
Payer: COMMERCIAL

## 2018-11-28 DIAGNOSIS — M72.2 PLANTAR FASCIITIS: Primary | ICD-10-CM

## 2018-11-28 PROCEDURE — 97110 THERAPEUTIC EXERCISES: CPT | Mod: GP | Performed by: PHYSICAL THERAPIST

## 2018-11-28 PROCEDURE — 97140 MANUAL THERAPY 1/> REGIONS: CPT | Mod: GP | Performed by: PHYSICAL THERAPIST

## 2018-11-28 PROCEDURE — 97035 APP MDLTY 1+ULTRASOUND EA 15: CPT | Mod: GP | Performed by: PHYSICAL THERAPIST

## 2018-12-05 ENCOUNTER — THERAPY VISIT (OUTPATIENT)
Dept: PHYSICAL THERAPY | Facility: CLINIC | Age: 63
End: 2018-12-05
Payer: COMMERCIAL

## 2018-12-05 DIAGNOSIS — M72.2 PLANTAR FASCIITIS: ICD-10-CM

## 2018-12-05 PROCEDURE — 97110 THERAPEUTIC EXERCISES: CPT | Mod: GP | Performed by: PHYSICAL THERAPIST

## 2018-12-05 PROCEDURE — 97140 MANUAL THERAPY 1/> REGIONS: CPT | Mod: GP | Performed by: PHYSICAL THERAPIST

## 2018-12-05 PROCEDURE — 97035 APP MDLTY 1+ULTRASOUND EA 15: CPT | Mod: GP | Performed by: PHYSICAL THERAPIST

## 2018-12-06 NOTE — PROGRESS NOTES
Subjective:  HPI                    Objective:  System    Physical Exam    General     ROS    Assessment/Plan:    PROGRESS  REPORT    Progress reporting period is from 11/21/18 to 12/5/18 (3 visits).       SUBJECTIVE  Subjective changes noted by patient:  Patient notes continued improvement.  Pain 1/10 now.  He's walking with minimal to no pain.  He has run up to 2 blocks to test it out and felt it a little bit, but feels it is getting better.  We discussed trialing jogging in pool vs walk/jog interval to start.  He feels good about beginning trial of self management with HEP, and will check back in PT if symptoms worsen after 2-3 weeks or he's not able to advance back to running.     Current Pain level: 1/10.     Initial Pain level: 4/10.   Changes in function:  Yes (See Goal flowsheet attached for changes in current functional level)  Adverse reaction to treatment or activity: None    OBJECTIVE  Changes noted in objective findings:  Yes,   Objective: B ankle DF and g toe DF PROM improved.  Balance improving.  Decreased tenderness medial calcaneal tubercle and fat pad.  Understands HEP well for stretching, soft tissue work he can do at home and toe/ankle strengthening     ASSESSMENT/PLAN  Updated problem list and treatment plan: Diagnosis 1:  R plantar fascitis > calcaneal fat pad irritation    Pain -  hot/cold therapy and manual therapy  Decreased ROM/flexibility - manual therapy and therapeutic exercise  Impaired balance - neuro re-education and therapeutic activities  Decreased proprioception - neuro re-education and therapeutic activities  Impaired gait - gait training  Decreased function - therapeutic activities  STG/LTGs have been met or progress has been made towards goals:  Yes (See Goal flow sheet completed today.)  Assessment of Progress: The patient's condition is improving.  Self Management Plans:  Patient has been instructed in a home treatment program.  I have re-evaluated this patient and find that  the nature, scope, duration and intensity of the therapy is appropriate for the medical condition of the patient.  Christophe continues to require the following intervention to meet STG and LTG's:  PT    Recommendations:  Will continue his HEP and self assess in 2-3 weeks with resolution of symptoms and return to prior level of activity.  If still having difficulties he will call and return to PT.  If going well he will continue on his own.     Please refer to the daily flowsheet for treatment today, total treatment time and time spent performing 1:1 timed codes.

## 2019-02-19 DIAGNOSIS — Z76.0 ENCOUNTER FOR MEDICATION REFILL: ICD-10-CM

## 2019-02-19 DIAGNOSIS — I25.10 CORONARY ARTERY DISEASE INVOLVING NATIVE CORONARY ARTERY OF NATIVE HEART WITHOUT ANGINA PECTORIS: ICD-10-CM

## 2019-02-19 DIAGNOSIS — I10 BENIGN ESSENTIAL HYPERTENSION: ICD-10-CM

## 2019-02-19 RX ORDER — METOPROLOL SUCCINATE 50 MG/1
TABLET, EXTENDED RELEASE ORAL
Qty: 90 TABLET | Refills: 0 | Status: SHIPPED | OUTPATIENT
Start: 2019-02-19 | End: 2019-05-28

## 2019-02-19 RX ORDER — ATORVASTATIN CALCIUM 80 MG/1
80 TABLET, FILM COATED ORAL DAILY
Qty: 90 TABLET | Refills: 1 | Status: SHIPPED | OUTPATIENT
Start: 2019-02-19 | End: 2019-09-16

## 2019-02-19 RX ORDER — LISINOPRIL 20 MG/1
20 TABLET ORAL DAILY
Qty: 90 TABLET | Refills: 1 | Status: SHIPPED | OUTPATIENT
Start: 2019-02-19 | End: 2019-08-14

## 2019-05-03 ENCOUNTER — HEALTH MAINTENANCE LETTER (OUTPATIENT)
Age: 64
End: 2019-05-03

## 2019-05-28 DIAGNOSIS — I10 BENIGN ESSENTIAL HYPERTENSION: ICD-10-CM

## 2019-05-28 RX ORDER — METOPROLOL SUCCINATE 50 MG/1
TABLET, EXTENDED RELEASE ORAL
Qty: 90 TABLET | Refills: 0 | Status: SHIPPED | OUTPATIENT
Start: 2019-05-28 | End: 2019-08-14 | Stop reason: ALTCHOICE

## 2019-05-28 NOTE — TELEPHONE ENCOUNTER
METOPROLOL  LOV (NR) 7/2/18  LOV 1/12/18  Last Labs 8/2/18    BP Readings from Last 3 Encounters:   08/09/18 118/76   07/02/18 114/68   03/20/18 122/78     Last Comprehensive Metabolic Panel:  Sodium   Date Value Ref Range Status   08/02/2018 136 136 - 145 mmol/L Final     Potassium   Date Value Ref Range Status   08/02/2018 4.3 3.5 - 5.1 mmol/L Final     Chloride   Date Value Ref Range Status   08/02/2018 103 98 - 107 mmol/L Final     Carbon Dioxide   Date Value Ref Range Status   08/02/2018 25 23 - 29 mmol/L Final     Anion Gap   Date Value Ref Range Status   08/02/2018 12.3 6 - 17 mmol/L Final     Glucose   Date Value Ref Range Status   08/02/2018 95 70 - 105 mg/dL Final     Comment:     Fasting specimen     Urea Nitrogen   Date Value Ref Range Status   08/02/2018 22 7 - 30 mg/dL Final     Creatinine   Date Value Ref Range Status   08/02/2018 1.12 0.70 - 1.30 mg/dL Final     GFR Estimate   Date Value Ref Range Status   08/02/2018 66 >60 mL/min/1.7m2 Final     Calcium   Date Value Ref Range Status   08/02/2018 9.3 8.5 - 10.5 mg/dL Final

## 2019-06-07 ENCOUNTER — OFFICE VISIT (OUTPATIENT)
Dept: FAMILY MEDICINE | Facility: CLINIC | Age: 64
End: 2019-06-07

## 2019-06-07 VITALS
HEART RATE: 64 BPM | DIASTOLIC BLOOD PRESSURE: 88 MMHG | TEMPERATURE: 98.4 F | SYSTOLIC BLOOD PRESSURE: 136 MMHG | BODY MASS INDEX: 28.33 KG/M2 | RESPIRATION RATE: 16 BRPM | WEIGHT: 203 LBS | OXYGEN SATURATION: 97 %

## 2019-06-07 DIAGNOSIS — R07.81 RIB PAIN ON LEFT SIDE: Primary | ICD-10-CM

## 2019-06-07 PROCEDURE — 99213 OFFICE O/P EST LOW 20 MIN: CPT | Performed by: FAMILY MEDICINE

## 2019-06-07 RX ORDER — HYDROCODONE BITARTRATE AND ACETAMINOPHEN 5; 325 MG/1; MG/1
1 TABLET ORAL EVERY 6 HOURS PRN
Qty: 20 TABLET | Refills: 0 | Status: SHIPPED | OUTPATIENT
Start: 2019-06-07 | End: 2019-08-09

## 2019-06-07 NOTE — PROGRESS NOTES
SUBJECTIVE:  Chief Complaint   Patient presents with     Chest Pain     Rib pain, left side     Christophe Del Rosario is a 63 year old male presents with a chief complaint of left rib pain, tenderness and decreased range of motion.  The injury occurred 9 day(s) ago.   The injury happened while at home. How: fall immediate pain.  The patient complained of moderate pain  and has had decreased ROM.  Pain exacerbated by twisting and lifting, coughing or sneezing is painful as well.  Relieved by heat.  He treated it initially with Ibuprofen. This is not the first time this type of injury has occurred to this patient.     Past Medical History:   Diagnosis Date     Chest pain      HTN (hypertension) 2/27/2012     Current Outpatient Medications   Medication Sig Dispense Refill     aspirin 81 MG chewable tablet Take 1 tablet (81 mg) by mouth daily 108 tablet 3     atorvastatin (LIPITOR) 80 MG tablet Take 1 tablet (80 mg) by mouth daily 90 tablet 1     lisinopril (PRINIVIL/ZESTRIL) 20 MG tablet Take 1 tablet (20 mg) by mouth daily 90 tablet 1     metoprolol succinate ER (TOPROL-XL) 50 MG 24 hr tablet TAKE 1 TABLET(50 MG) BY MOUTH DAILY 90 tablet 0     Social History     Tobacco Use     Smoking status: Current Every Day Smoker     Packs/day: 0.10     Types: Cigarettes     Smokeless tobacco: Never Used     Tobacco comment: 1 a day    Substance Use Topics     Alcohol use: Yes     Alcohol/week: 12.0 oz     Types: 20 Standard drinks or equivalent per week     Comment: 15-20 per week     ROS:  CONSTITUTIONAL:NEGATIVE for fever, chills, change in weight  INTEGUMENTARY/SKIN: NEGATIVE for worrisome rashes, moles or lesions    EXAM:   /88   Pulse 64   Temp 98.4  F (36.9  C) (Temporal)   Resp 16   Wt 92.1 kg (203 lb)   SpO2 97%   BMI 28.33 kg/m    Gen: mild distress and over weight  Extremity: L chest wall has pain with pressure over ribs.   Heart and lungs examined and normal    X-RAY was not done.    ASSESSMENT:   1. Rib pain  on left side  Reassure   Symptomatic cares were discussed in detail.   Pt instructed to come back to the clinic for worsening sx   - HYDROcodone-acetaminophen (NORCO) 5-325 MG tablet; Take 1 tablet by mouth every 6 hours as needed for severe pain  Dispense: 20 tablet; Refill: 0

## 2019-08-08 ENCOUNTER — HOSPITAL ENCOUNTER (OUTPATIENT)
Dept: CARDIOLOGY | Facility: CLINIC | Age: 64
Discharge: HOME | End: 2019-08-08
Attending: INTERNAL MEDICINE | Admitting: INTERNAL MEDICINE
Payer: COMMERCIAL

## 2019-08-08 DIAGNOSIS — I25.10 CORONARY ARTERY DISEASE INVOLVING NATIVE CORONARY ARTERY OF NATIVE HEART WITHOUT ANGINA PECTORIS: ICD-10-CM

## 2019-08-08 DIAGNOSIS — R91.8 PULMONARY NODULES: ICD-10-CM

## 2019-08-08 DIAGNOSIS — I10 ESSENTIAL HYPERTENSION: ICD-10-CM

## 2019-08-08 LAB
ALT SERPL W P-5'-P-CCNC: 6 U/L (ref 5–30)
ANION GAP SERPL CALCULATED.3IONS-SCNC: 13.1 MMOL/L (ref 6–17)
BUN SERPL-MCNC: 21 MG/DL (ref 7–30)
CALCIUM SERPL-MCNC: 9 MG/DL (ref 8.5–10.5)
CHLORIDE SERPL-SCNC: 104 MMOL/L (ref 98–107)
CHOLEST SERPL-MCNC: 139 MG/DL
CO2 SERPL-SCNC: 26 MMOL/L (ref 23–29)
CREAT SERPL-MCNC: 1.02 MG/DL (ref 0.7–1.3)
GFR SERPL CREATININE-BSD FRML MDRD: 74 ML/MIN/{1.73_M2}
GLUCOSE SERPL-MCNC: 108 MG/DL (ref 70–105)
HDLC SERPL-MCNC: 66 MG/DL
LDLC SERPL CALC-MCNC: 58 MG/DL
NONHDLC SERPL-MCNC: 73 MG/DL
POTASSIUM SERPL-SCNC: 4.1 MMOL/L (ref 3.5–5.1)
SODIUM SERPL-SCNC: 139 MMOL/L (ref 136–145)
TRIGL SERPL-MCNC: 73 MG/DL

## 2019-08-08 PROCEDURE — 71250 CT THORAX DX C-: CPT

## 2019-08-08 PROCEDURE — 36415 COLL VENOUS BLD VENIPUNCTURE: CPT | Performed by: INTERNAL MEDICINE

## 2019-08-08 PROCEDURE — 80048 BASIC METABOLIC PNL TOTAL CA: CPT | Performed by: INTERNAL MEDICINE

## 2019-08-08 PROCEDURE — 84460 ALANINE AMINO (ALT) (SGPT): CPT | Performed by: INTERNAL MEDICINE

## 2019-08-08 PROCEDURE — 80061 LIPID PANEL: CPT | Performed by: INTERNAL MEDICINE

## 2019-08-09 ENCOUNTER — OFFICE VISIT (OUTPATIENT)
Dept: FAMILY MEDICINE | Facility: CLINIC | Age: 64
End: 2019-08-09

## 2019-08-09 VITALS
HEART RATE: 61 BPM | TEMPERATURE: 98.7 F | WEIGHT: 200 LBS | SYSTOLIC BLOOD PRESSURE: 154 MMHG | DIASTOLIC BLOOD PRESSURE: 98 MMHG | BODY MASS INDEX: 27.91 KG/M2 | OXYGEN SATURATION: 98 %

## 2019-08-09 DIAGNOSIS — R55 SYNCOPE, UNSPECIFIED SYNCOPE TYPE: ICD-10-CM

## 2019-08-09 DIAGNOSIS — M54.50 ACUTE MIDLINE LOW BACK PAIN WITHOUT SCIATICA: Primary | ICD-10-CM

## 2019-08-09 DIAGNOSIS — F10.20 UNCOMPLICATED ALCOHOL DEPENDENCE (H): ICD-10-CM

## 2019-08-09 PROCEDURE — 99214 OFFICE O/P EST MOD 30 MIN: CPT | Performed by: FAMILY MEDICINE

## 2019-08-09 RX ORDER — HYDROCODONE BITARTRATE AND ACETAMINOPHEN 5; 325 MG/1; MG/1
1 TABLET ORAL EVERY 6 HOURS PRN
Qty: 20 TABLET | Refills: 0 | Status: SHIPPED | OUTPATIENT
Start: 2019-08-09 | End: 2020-05-30

## 2019-08-09 NOTE — PROGRESS NOTES
SUBJECTIVE  HPI: Christophe Del Rosario is a 63 year old male who presents for evaluation of back pain  Symptoms began 6hour(s) ago, have been onset acute and frequency intermittant and are worse.  Pain is located in the low back bilateral region, with radiation to does not radiate, and are at worst a 7 on a scale of 1-10.  Recent injury:none recalled by the patient  Personal hx of back pain is recurrent self limited episodes of low back pain in the past.  Pain is exacerbated by: bending and twisting.  Pain is relieved by: ice and rest[unfilled] sx include: none.  Red flag symptoms: negative.    Has hx of htn and see cards for prevention.   Having some syncopal spells  Has been chcking bp and wonders if he needs less meds   Drinks 3-4 daily   No cp or sob    Past Medical History:   Diagnosis Date     Chest pain      HTN (hypertension) 2/27/2012     Current Outpatient Medications   Medication Sig Dispense Refill     aspirin 81 MG chewable tablet Take 1 tablet (81 mg) by mouth daily 108 tablet 3     atorvastatin (LIPITOR) 80 MG tablet Take 1 tablet (80 mg) by mouth daily 90 tablet 1     lisinopril (PRINIVIL/ZESTRIL) 20 MG tablet Take 1 tablet (20 mg) by mouth daily 90 tablet 1     metoprolol succinate ER (TOPROL-XL) 50 MG 24 hr tablet TAKE 1 TABLET(50 MG) BY MOUTH DAILY 90 tablet 0     Social History     Tobacco Use     Smoking status: Current Every Day Smoker     Packs/day: 0.10     Types: Cigarettes     Smokeless tobacco: Never Used     Tobacco comment: 1 a day    Substance Use Topics     Alcohol use: Yes     Alcohol/week: 12.0 oz     Types: 20 Standard drinks or equivalent per week     Comment: 15-20 per week     ROS:  CONSTITUTIONAL:NEGATIVE for fever, chills, change in weight  INTEGUMENTARY/SKIN: NEGATIVE for worrisome rashes, moles or lesions  EYES: NEGATIVE for vision changes or irritation  ENT/MOUTH: NEGATIVE for ear, mouth and throat problems    OBJECTIVE:  BP (!) 154/98   Pulse 61   Temp 98.7  F (37.1  C)  (Temporal)   Wt 90.7 kg (200 lb)   SpO2 98%   BMI 27.91 kg/m    GENERAL: healthy, alert and mild distress  EYES: Eyes grossly normal to inspection, PERRL and conjunctivae and sclerae normal  NECK: no adenopathy, no asymmetry, masses, or scars and thyroid normal to palpation  RESP: lungs clear to auscultation - no rales, rhonchi or wheezes  CV: regular rate and rhythm, normal S1 S2, no S3 or S4, no murmur, click or rub, no peripheral edema and peripheral pulses strong  MS: no gross musculoskeletal defects noted, no edema  SKIN: no suspicious lesions or rashes  NEURO: Normal strength and tone, mentation intact and speech normal  PSYCH: mentation appears normal, affect normal/bright    ASSESSMENT/IMPRESSION:  1. Acute midline low back pain without sciatica  Pain meds   Pt instructed to come back to the clinic for worsening sx    - Referral to [facility] Therapy; Future    2. Syncope, unspecified syncope type  Monitor BP at home   Restart ace as had stopped for 2 weeks     - US Carotid Bilateral; Future     3. EtOH dependence    Sobriety recommended

## 2019-08-13 ENCOUNTER — THERAPY VISIT (OUTPATIENT)
Dept: PHYSICAL THERAPY | Facility: CLINIC | Age: 64
End: 2019-08-13
Payer: COMMERCIAL

## 2019-08-13 DIAGNOSIS — M54.50 ACUTE BILATERAL LOW BACK PAIN WITHOUT SCIATICA: Primary | ICD-10-CM

## 2019-08-13 PROCEDURE — 97161 PT EVAL LOW COMPLEX 20 MIN: CPT | Mod: GP | Performed by: PHYSICAL THERAPIST

## 2019-08-13 PROCEDURE — 97110 THERAPEUTIC EXERCISES: CPT | Mod: GP | Performed by: PHYSICAL THERAPIST

## 2019-08-13 NOTE — PROGRESS NOTES
[facility] Initial Evaluation  Subjective:  Christophe Del Rosario is a 63 year old male.    Patient s chief complaints: B low back (R>L).    Condition occurred due to pulling on a garden hose.  Date of Onset: 8/9/19  Location of symptoms is B LB R>L .  Symptoms other than pain include: stiffness, loss of motion  Quality of pain is sharp at times, otherwise generally dull and frequency is intermittent---can be without if lays down in a comfortable position.    Pain dependence on time of day is: worse morning.   Pain rating is: 8/10.    Symptoms are exacerbated by: arising from a chair, getting in/out of the car, lifting, sitting, standing.    Symptoms are relieved by:  Ice/heat, pain medication.    Progression of symptoms is that symptoms are:  Gradually improving.  Imaging/Special tests include: none recent (2015 MRI L4-5 disc protrusion to L, )   Chest CT on 8/8: IMPRESSION:  1. New area of nodular groundglass opacity in the left lower lobe  measuring approximately 7 mm. Recommend close surveillance with  follow-up CT examination 3-6 months. Etiology is nonspecific as it may  be inflammatory, infectious, among other etiologies, though reason for  close surveillance.  2. Numerous additional bilateral pulmonary nodules appear stable.  Previous treatments include: none.   Patient reports that general health is: excellent.   Pertinent medical history includes:  HBP, hx of smoking.    Medical allergies includes: none.   Surgical history includes: none.  Current medications include: HBP, statin  Current occupation is: retired  Work status is: computer work, driving, lift/carry, machines  Primary job tasks include: none  Barriers include: none  Red flags include: none    Patient's expectations for therapy include: reduce pain as quickly as can'  HPI                    Objective:  LUMBAR:    Posture: fair  Posture Correction: increased pain fully erect/overarch position, not as much slouch or  "mid  Relevant Lateral Shift: no    Neurological:    Motor Deficit:  Myotomes L R   L1-2 (hip flexion) 5 5   L3 (knee extension) 5 5   L4 (ankle DF) 5 5   L5 (g. toe ext) 5 5   S1 (ankle PF or knee flex) 5 5     Sensory Deficit, Reflexes: intact light touch screen B LE dermatomes    Dural Signs:   L R   Slump negative Positive for LBP   SLR negative negative     AROM: (Major, Moderate, Minimal or Nil loss)  Movement Loss [place] Mod Min Nil Pain   Flexion  X   To knees with increased LBP   Extension   X  No change in pain \"feels pretty good\"   Side Gliding L        Side Gliding R          Repeated movement testing:   (During: produces, abolishes, increases, decreases, no effect, centralizing, peripheralizing; After: better, worse, no better, no worse, no effect, centralized, peripheralized)    Pre-test Symptoms Lying: no pain in supine if knees bent    Symptoms During Symptoms After ROM increased ROM decreased No Effect   ROBERT        Rep ROBERT Increased LB, either side with SKTC and slightly with DKTC No worse, just trialed SKTC x 1 each side and DKTC x 1    X   EIL        Rep EIL No pain/no effect No pain/no effect X         Static Tests: prone lying is without pain, CHEPE no pain;   Other Tests: tight lumbar paraspinals with palpation    Provisional Classification: derangement  Principle of Management: will initiate repeated extension in standing or prone progression into press up.  Either every 2-3 hours.  Will work on posture, core strength and gradual return to activities as well.     System    Physical Exam    General     ROS    Assessment/Plan:    Patient is a 63 year old male with lumbar complaints.    Patient has the following significant findings with corresponding treatment plan.                Diagnosis 1:  LBP without radiation.     Pain -  hot/cold therapy, manual therapy and directional preference exercise  Decreased ROM/flexibility - manual therapy and therapeutic exercise  Decreased strength - therapeutic " exercise and therapeutic activities  Decreased proprioception - neuro re-education and therapeutic activities  Decreased function - therapeutic activities  Impaired posture - neuro re-education    Therapy Evaluation Codes:   1) History comprised of:   Personal factors that impact the plan of care:      None.    Comorbidity factors that impact the plan of care are:      None.     Medications impacting care: None.  2) Examination of Body Systems comprised of:   Body structures and functions that impact the plan of care:      Lumbar spine.   Activity limitations that impact the plan of care are:      Bending, Driving, Dressing, Lifting and Sitting.  3) Clinical presentation characteristics are:   Stable/Uncomplicated.  4) Decision-Making    Low complexity using standardized patient assessment instrument and/or measureable assessment of functional outcome.  Cumulative Therapy Evaluation is: Low complexity.    Previous and current functional limitations:  (See Goal Flow Sheet for this information)    Short term and Long term goals: (See Goal Flow Sheet for this information)     Communication ability:  Patient appears to be able to clearly communicate and understand verbal and written communication and follow directions correctly.  Treatment Explanation - The following has been discussed with the patient:   RX ordered/plan of care  Anticipated outcomes  Possible risks and side effects  This patient would benefit from PT intervention to resume normal activities.   Rehab potential is good.    Frequency:  1 X week, once daily  Duration:  for 6 weeks  Discharge Plan:  Achieve all LTG.  Independent in home treatment program.  Reach maximal therapeutic benefit.    Please refer to the daily flowsheet for treatment today, total treatment time and time spent performing 1:1 timed codes.

## 2019-08-14 ENCOUNTER — OFFICE VISIT (OUTPATIENT)
Dept: CARDIOLOGY | Facility: CLINIC | Age: 64
End: 2019-08-14
Payer: COMMERCIAL

## 2019-08-14 VITALS
BODY MASS INDEX: 28.07 KG/M2 | HEIGHT: 71 IN | WEIGHT: 200.5 LBS | DIASTOLIC BLOOD PRESSURE: 87 MMHG | SYSTOLIC BLOOD PRESSURE: 149 MMHG | HEART RATE: 64 BPM

## 2019-08-14 DIAGNOSIS — R55 SYNCOPE, UNSPECIFIED SYNCOPE TYPE: ICD-10-CM

## 2019-08-14 DIAGNOSIS — Z72.0 TOBACCO ABUSE: ICD-10-CM

## 2019-08-14 DIAGNOSIS — I10 ESSENTIAL HYPERTENSION: ICD-10-CM

## 2019-08-14 DIAGNOSIS — I25.10 CORONARY ARTERY DISEASE INVOLVING NATIVE CORONARY ARTERY OF NATIVE HEART WITHOUT ANGINA PECTORIS: ICD-10-CM

## 2019-08-14 DIAGNOSIS — R91.8 PULMONARY NODULES: Primary | ICD-10-CM

## 2019-08-14 DIAGNOSIS — E78.1 PURE HYPERGLYCERIDEMIA: ICD-10-CM

## 2019-08-14 DIAGNOSIS — Z82.49 FAMILY HISTORY OF ISCHEMIC HEART DISEASE: ICD-10-CM

## 2019-08-14 DIAGNOSIS — I49.3 PVC'S (PREMATURE VENTRICULAR CONTRACTIONS): ICD-10-CM

## 2019-08-14 DIAGNOSIS — Z76.0 ENCOUNTER FOR MEDICATION REFILL: ICD-10-CM

## 2019-08-14 PROCEDURE — 99214 OFFICE O/P EST MOD 30 MIN: CPT | Performed by: INTERNAL MEDICINE

## 2019-08-14 RX ORDER — LISINOPRIL 40 MG/1
40 TABLET ORAL DAILY
Qty: 90 TABLET | Refills: 3 | Status: SHIPPED | OUTPATIENT
Start: 2019-08-14 | End: 2019-09-17

## 2019-08-14 RX ORDER — LISINOPRIL 40 MG/1
20 TABLET ORAL DAILY
Qty: 90 TABLET | Refills: 3 | Status: SHIPPED | OUTPATIENT
Start: 2019-08-14 | End: 2019-08-14

## 2019-08-14 ASSESSMENT — MIFFLIN-ST. JEOR: SCORE: 1726.59

## 2019-08-14 NOTE — PROGRESS NOTES
HPI and Plan:   See dictation    Orders Placed This Encounter   Procedures     Follow-Up with Cardiac Advanced Practice Provider     Cardiac Event Monitor Adult Pediatric     Exercise Stress Echocardiogram       Orders Placed This Encounter   Medications     DISCONTD: lisinopril (PRINIVIL/ZESTRIL) 40 MG tablet     Sig: Take 0.5 tablets (20 mg) by mouth daily     Dispense:  90 tablet     Refill:  3     lisinopril (PRINIVIL/ZESTRIL) 40 MG tablet     Sig: Take 1 tablet (40 mg) by mouth daily     Dispense:  90 tablet     Refill:  3     Please ignore the previous presciption. To take a full 40 mg pill per day.       Medications Discontinued During This Encounter   Medication Reason     metoprolol succinate ER (TOPROL-XL) 50 MG 24 hr tablet Alternate therapy     lisinopril (PRINIVIL/ZESTRIL) 20 MG tablet Reorder     lisinopril (PRINIVIL/ZESTRIL) 40 MG tablet Reorder         Encounter Diagnoses   Name Primary?     Pulmonary nodules Yes     PVC's (premature ventricular contractions)      Coronary artery disease involving native coronary artery of native heart without angina pectoris      Essential hypertension      Tobacco abuse      Family history of ischemic heart disease      Pure hyperglyceridemia      Syncope, unspecified syncope type      Encounter for medication refill        CURRENT MEDICATIONS:  Current Outpatient Medications   Medication Sig Dispense Refill     aspirin 81 MG chewable tablet Take 1 tablet (81 mg) by mouth daily 108 tablet 3     atorvastatin (LIPITOR) 80 MG tablet Take 1 tablet (80 mg) by mouth daily 90 tablet 1     lisinopril (PRINIVIL/ZESTRIL) 40 MG tablet Take 1 tablet (40 mg) by mouth daily 90 tablet 3     HYDROcodone-acetaminophen (NORCO) 5-325 MG tablet Take 1 tablet by mouth every 6 hours as needed for severe pain (Patient not taking: Reported on 8/14/2019) 20 tablet 0       ALLERGIES   No Known Allergies    PAST MEDICAL HISTORY:  Past Medical History:   Diagnosis Date     ASCVD  (arteriosclerotic cardiovascular disease)      Chest pain      HTN (hypertension) 2/27/2012       PAST SURGICAL HISTORY:  History reviewed. No pertinent surgical history.    FAMILY HISTORY:  Family History   Problem Relation Age of Onset     Coronary Artery Disease Father      Cerebrovascular Disease Brother 53        small stroke       SOCIAL HISTORY:  Social History     Socioeconomic History     Marital status:      Spouse name: Flory in 1996     Number of children: None     Years of education: None     Highest education level: None   Occupational History     Occupation: [employer]     Employer: [employer]   Social Needs     Financial resource strain: None     Food insecurity:     Worry: None     Inability: None     Transportation needs:     Medical: None     Non-medical: None   Tobacco Use     Smoking status: Current Every Day Smoker     Packs/day: 0.10     Types: Cigarettes     Smokeless tobacco: Never Used     Tobacco comment: 1 a day    Substance and Sexual Activity     Alcohol use: Yes     Alcohol/week: 12.0 oz     Types: 20 Standard drinks or equivalent per week     Comment: 15-20 per week     Drug use: No     Sexual activity: Yes     Partners: Female   Lifestyle     Physical activity:     Days per week: None     Minutes per session: None     Stress: None   Relationships     Social connections:     Talks on phone: None     Gets together: None     Attends Yarsanism service: None     Active member of club or organization: None     Attends meetings of clubs or organizations: None     Relationship status: None     Intimate partner violence:     Fear of current or ex partner: None     Emotionally abused: None     Physically abused: None     Forced sexual activity: None   Other Topics Concern     Parent/sibling w/ CABG, MI or angioplasty before 65F 55M? Not Asked   Social History Narrative     None       Review of Systems:  Skin:  Negative       Eyes:  Positive for glasses;visual blurring due to med?  ENT:   "Negative      Respiratory:  Negative       Cardiovascular:  Negative Positive for;lightheadedness;syncope or near-syncope syncopal episode possibly,fell and brokein May. 2-3 weeks ago passed out. due to medication?  Gastroenterology: Negative      Genitourinary:  Negative      Musculoskeletal:  Negative      Neurologic:  Negative      Psychiatric:  Negative      Heme/Lymph/Imm:  Negative      Endocrine:  Negative        Physical Exam:  Vitals: BP (!) 149/87   Pulse 64   Ht 1.803 m (5' 11\")   Wt 90.9 kg (200 lb 8 oz)   BMI 27.96 kg/m      Constitutional:  cooperative, alert and oriented, well developed, well nourished, in no acute distress        Skin:  warm and dry to the touch, no apparent skin lesions or masses noted          Head:  normocephalic, no masses or lesions        Eyes:  pupils equal and round, conjunctivae and lids unremarkable, sclera white, no xanthalasma, EOMS intact, no nystagmus        Lymph:No Cervical lymphadenopathy present     ENT:  no pallor or cyanosis        Neck:  carotid pulses are full and equal bilaterally, JVP normal, no carotid bruit        Respiratory:  normal breath sounds, clear to auscultation, normal A-P diameter, normal symmetry, normal respiratory excursion, no use of accessory muscles         Cardiac: regular rhythm, normal S1/S2, no S3 or S4, apical impulse not displaced, no murmurs, gallops or rubs occasional premature beats              pulses full and equal                                        GI:  not assessed this visit        Extremities and Muscular Skeletal:  no deformities, clubbing, cyanosis, erythema observed;no edema              Neurological:  no gross motor deficits;affect appropriate        Psych:  Alert and Oriented x 3        CC  No referring provider defined for this encounter.              "

## 2019-08-14 NOTE — LETTER
8/14/2019      Jorge L Mullen MD  [address]  [facility] [address]      RE: Christophe Castellanol       Dear Colleague,    I had the pleasure of seeing Christophe Del Rosario in the [facility].    Service Date: 08/14/2019      HISTORY OF PRESENT ILLNESS:  It was my pleasure to see your patient, Christophe Del Rosario, who is a pleasant 63-year-old patient with a known history of coronary artery disease based upon a calcium score.  He had a stress echocardiogram which showed no evidence of ischemia.  However, on the CT scan on 08/14/2019 the patient did have nodules.  CT scan was repeated a few days ago as per protocol and this showed that there was a new area of nodular ground-glass opacity in the left lower lobe measuring approximately 7 mm and close surveillance has been recommended for that.  I have asked the patient to follow up with you for this noncardiac issue which is somewhat concerning given that he is a smoker.      More concerning, however, is that the patient has had 2 episodes of syncope.  The first episode occurred on Memorial Day.  He was working out in his yard.  He was a very hot day.  He had not eaten much or drank much.  He came in, sat down, he had 3 scotches.  He stood up, felt extremely dizzy and lost consciousness.  He was out for maybe 20 seconds.  He stood up, lost consciousness again and this happened about 4 times.  He did lose urinary incontinence.  However, his wife witnessed the fainting spells and she did not notice any jerking or seizure-like activity.  Ambulance was called, but the patient did not go in the ambulance to the hospital.  It was noted according to his wife that his systolic blood pressure was 80.  She cannot remember a pulse rate.      He did well until about 2 weeks ago and then he had quite a similar scenario.  He was out working.  It was hot in his yard, he came in and then he was trying to hydrate that time; however, and again on  standing up he lost consciousness for a short period of time and this only occurred once.  He did not seek medical advice for that.  At no stage did he have chest pains, chest pressure or shortness of breath.        The patient prior to this was taking lisinopril 20 mg per day and metoprolol succinate 50 mg per day.  He stopped the lisinopril himself.        His blood pressure was mildly raised today at 149/87.        His lipid profile today was excellent with an LDL of 58, HDL of 66 and triglycerides of 73.  His electrolytes were normal with a sodium of 139, potassium 4.1.  His BUN and creatinine were 21 and 1.02 respectively.  He has no hepatic toxicity with an ALT of 6.  His pulse rate today is 64 beats per minute.      IMPRESSION:   1.  Syncope.  The episodes of syncope appear to be associated with heat and possibly dehydration, though the second episode was not clearly associated with that.  The consumption of alcohol at the time of dehydration on Memorial Day could have aggravated the syncopal episode by further vasodilatation.  Also, the beta blocker could have aggravated the issue by lowering blood pressure and / or lowering heart rate.   2.  Essential hypertension.  His blood pressure is higher today, possible because he stopped the lisinopril medication himself.   3.  Tobacco abuse.   6.  New finding in the left lower lobe as described above.  This is obviously of concern as the patient is a smoker.      PLAN:   1.  Firstly, we will obtain an event monitor to determine if the patient is having asymptomatic episodes of high-grade conduction system disease or arrhythmias.   2.  I am going to stop the metoprolol medication as this could be aggravating his syncopal episodes.  I will increase the dose of lisinopril from 20 mg per day to 40 mg per day and will recheck his blood pressure when he returns for the results of his event monitoring.   3.  We will obtain a stress echocardiogram to determine if there is  any evidence of ischemia causing his syncopal episode.   4.  I know that you have ordered a carotid ultrasound, which will also include the vertebral arteries.  I have encouraged him to continue with that investigation.     5. He was advised to stop smoking.     As I mentioned above, we will have the patient follow up in a month's time with all of our testing and again I reiterated to the patient how important it is for him to follow up with you with respect that  new finding in the left lower lobe, especially given that he is a smoker.        It has been my pleasure to be involved in the care of this very nice patient.      cc:   Jorge L Mullen MD    [facility]   [address] [place] [address]         DON CALL MD, [facility]             D: 2019   T: 2019   MT: [id number]      Name:     CHRYSTAL JACKSON   MRN:      [id number]        Account:      [id number]   :      1955           Service Date: 2019      Document: [id number]           Outpatient Encounter Medications as of 2019   Medication Sig Dispense Refill     aspirin 81 MG chewable tablet Take 1 tablet (81 mg) by mouth daily 108 tablet 3     atorvastatin (LIPITOR) 80 MG tablet Take 1 tablet (80 mg) by mouth daily 90 tablet 1     lisinopril (PRINIVIL/ZESTRIL) 40 MG tablet Take 1 tablet (40 mg) by mouth daily 90 tablet 3     HYDROcodone-acetaminophen (NORCO) 5-325 MG tablet Take 1 tablet by mouth every 6 hours as needed for severe pain (Patient not taking: Reported on 2019) 20 tablet 0     [DISCONTINUED] lisinopril (PRINIVIL/ZESTRIL) 20 MG tablet Take 1 tablet (20 mg) by mouth daily (Patient not taking: Reported on 2019) 90 tablet 1     [DISCONTINUED] lisinopril (PRINIVIL/ZESTRIL) 40 MG tablet Take 0.5 tablets (20 mg) by mouth daily 90 tablet 3     [DISCONTINUED] metoprolol succinate ER (TOPROL-XL) 50 MG 24 hr tablet TAKE 1 TABLET(50 MG) BY MOUTH DAILY 90 tablet 0     No facility-administered  encounter medications on file as of 8/14/2019.        Again, thank you for allowing me to participate in the care of your patient.      Sincerely,    Reji Medellin MD, MD     [facility]

## 2019-08-14 NOTE — LETTER
8/14/2019    Jorge L Mullen MD  [address]  [facility] [address]    RE: Christophe Del Rosario       Dear Colleague,    I had the pleasure of seeing Christophe Del Rosario in the [facility].    HPI and Plan:   See dictation    Orders Placed This Encounter   Procedures     Follow-Up with Cardiac Advanced Practice Provider     Cardiac Event Monitor Adult Pediatric     Exercise Stress Echocardiogram       Orders Placed This Encounter   Medications     DISCONTD: lisinopril (PRINIVIL/ZESTRIL) 40 MG tablet     Sig: Take 0.5 tablets (20 mg) by mouth daily     Dispense:  90 tablet     Refill:  3     lisinopril (PRINIVIL/ZESTRIL) 40 MG tablet     Sig: Take 1 tablet (40 mg) by mouth daily     Dispense:  90 tablet     Refill:  3     Please ignore the previous presciption. To take a full 40 mg pill per day.       Medications Discontinued During This Encounter   Medication Reason     metoprolol succinate ER (TOPROL-XL) 50 MG 24 hr tablet Alternate therapy     lisinopril (PRINIVIL/ZESTRIL) 20 MG tablet Reorder     lisinopril (PRINIVIL/ZESTRIL) 40 MG tablet Reorder         Encounter Diagnoses   Name Primary?     Pulmonary nodules Yes     PVC's (premature ventricular contractions)      Coronary artery disease involving native coronary artery of native heart without angina pectoris      Essential hypertension      Tobacco abuse      Family history of ischemic heart disease      Pure hyperglyceridemia      Syncope, unspecified syncope type      Encounter for medication refill        CURRENT MEDICATIONS:  Current Outpatient Medications   Medication Sig Dispense Refill     aspirin 81 MG chewable tablet Take 1 tablet (81 mg) by mouth daily 108 tablet 3     atorvastatin (LIPITOR) 80 MG tablet Take 1 tablet (80 mg) by mouth daily 90 tablet 1     lisinopril (PRINIVIL/ZESTRIL) 40 MG tablet Take 1 tablet (40 mg) by mouth daily 90 tablet 3     HYDROcodone-acetaminophen (NORCO) 5-325 MG tablet Take 1  tablet by mouth every 6 hours as needed for severe pain (Patient not taking: Reported on 8/14/2019) 20 tablet 0       ALLERGIES   No Known Allergies    PAST MEDICAL HISTORY:  Past Medical History:   Diagnosis Date     ASCVD (arteriosclerotic cardiovascular disease)      Chest pain      HTN (hypertension) 2/27/2012       PAST SURGICAL HISTORY:  History reviewed. No pertinent surgical history.    FAMILY HISTORY:  Family History   Problem Relation Age of Onset     Coronary Artery Disease Father      Cerebrovascular Disease Brother 53        small stroke       SOCIAL HISTORY:  Social History     Socioeconomic History     Marital status:      Spouse name: Flory in 1996     Number of children: None     Years of education: None     Highest education level: None   Occupational History     Occupation: [employer]     Employer: [employer]   Social Needs     Financial resource strain: None     Food insecurity:     Worry: None     Inability: None     Transportation needs:     Medical: None     Non-medical: None   Tobacco Use     Smoking status: Current Every Day Smoker     Packs/day: 0.10     Types: Cigarettes     Smokeless tobacco: Never Used     Tobacco comment: 1 a day    Substance and Sexual Activity     Alcohol use: Yes     Alcohol/week: 12.0 oz     Types: 20 Standard drinks or equivalent per week     Comment: 15-20 per week     Drug use: No     Sexual activity: Yes     Partners: Female   Lifestyle     Physical activity:     Days per week: None     Minutes per session: None     Stress: None   Relationships     Social connections:     Talks on phone: None     Gets together: None     Attends Confucianist service: None     Active member of club or organization: None     Attends meetings of clubs or organizations: None     Relationship status: None     Intimate partner violence:     Fear of current or ex partner: None     Emotionally abused: None     Physically abused: None     Forced sexual activity: None   Other Topics  "Concern     Parent/sibling w/ CABG, MI or angioplasty before 65F 55M? Not Asked   Social History Narrative     None       Review of Systems:  Skin:  Negative       Eyes:  Positive for glasses;visual blurring due to med?  ENT:  Negative      Respiratory:  Negative       Cardiovascular:  Negative Positive for;lightheadedness;syncope or near-syncope syncopal episode possibly,fell and brokein May. 2-3 weeks ago passed out. due to medication?  Gastroenterology: Negative      Genitourinary:  Negative      Musculoskeletal:  Negative      Neurologic:  Negative      Psychiatric:  Negative      Heme/Lymph/Imm:  Negative      Endocrine:  Negative        Physical Exam:  Vitals: BP (!) 149/87   Pulse 64   Ht 1.803 m (5' 11\")   Wt 90.9 kg (200 lb 8 oz)   BMI 27.96 kg/m       Constitutional:  cooperative, alert and oriented, well developed, well nourished, in no acute distress        Skin:  warm and dry to the touch, no apparent skin lesions or masses noted          Head:  normocephalic, no masses or lesions        Eyes:  pupils equal and round, conjunctivae and lids unremarkable, sclera white, no xanthalasma, EOMS intact, no nystagmus        Lymph:No Cervical lymphadenopathy present     ENT:  no pallor or cyanosis        Neck:  carotid pulses are full and equal bilaterally, JVP normal, no carotid bruit        Respiratory:  normal breath sounds, clear to auscultation, normal A-P diameter, normal symmetry, normal respiratory excursion, no use of accessory muscles         Cardiac: regular rhythm, normal S1/S2, no S3 or S4, apical impulse not displaced, no murmurs, gallops or rubs occasional premature beats              pulses full and equal                                        GI:  not assessed this visit        Extremities and Muscular Skeletal:  no deformities, clubbing, cyanosis, erythema observed;no edema              Neurological:  no gross motor deficits;affect appropriate        Psych:  Alert and Oriented x 3  "       CC  No referring provider defined for this encounter.                Thank you for allowing me to participate in the care of your patient.      Sincerely,     Reji Medellin MD, MD     [facility]    cc:   No referring provider defined for this encounter.

## 2019-08-14 NOTE — PROGRESS NOTES
Service Date: 08/14/2019      HISTORY OF PRESENT ILLNESS:  It was my pleasure to see your patient, Christophe Del Rosario, who is a pleasant 63-year-old patient with a known history of coronary artery disease based upon a calcium score.  He had a stress echocardiogram which showed no evidence of ischemia.  However, on the CT scan on 08/14/2019 the patient did have nodules.  CT scan was repeated a few days ago as per protocol and this showed that there was a new area of nodular ground-glass opacity in the left lower lobe measuring approximately 7 mm and close surveillance has been recommended for that.  I have asked the patient to follow up with you for this noncardiac issue which is somewhat concerning given that he is a smoker.      More concerning, however, is that the patient has had 2 episodes of syncope.  The first episode occurred on Memorial Day.  He was working out in his yard.  He was a very hot day.  He had not eaten much or drank much.  He came in, sat down, he had 3 scotches.  He stood up, felt extremely dizzy and lost consciousness.  He was out for maybe 20 seconds.  He stood up, lost consciousness again and this happened about 4 times.  He did lose urinary incontinence.  However, his wife witnessed the fainting spells and she did not notice any jerking or seizure-like activity.  Ambulance was called, but the patient did not go in the ambulance to the hospital.  It was noted according to his wife that his systolic blood pressure was 80.  She cannot remember a pulse rate.      He did well until about 2 weeks ago and then he had quite a similar scenario.  He was out working.  It was hot in his yard, he came in and then he was trying to hydrate that time; however, and again on standing up he lost consciousness for a short period of time and this only occurred once.  He did not seek medical advice for that.  At no stage did he have chest pains, chest pressure or shortness of breath.        The patient prior to  this was taking lisinopril 20 mg per day and metoprolol succinate 50 mg per day.  He stopped the lisinopril himself.        His blood pressure was mildly raised today at 149/87.        His lipid profile today was excellent with an LDL of 58, HDL of 66 and triglycerides of 73.  His electrolytes were normal with a sodium of 139, potassium 4.1.  His BUN and creatinine were 21 and 1.02 respectively.  He has no hepatic toxicity with an ALT of 6.  His pulse rate today is 64 beats per minute.      IMPRESSION:   1.  Syncope.  The episodes of syncope appear to be associated with heat and possibly dehydration, though the second episode was not clearly associated with that.  The consumption of alcohol at the time of dehydration on Memorial Day could have aggravated the syncopal episode by further vasodilatation.  Also, the beta blocker could have aggravated the issue by lowering blood pressure and / or lowering heart rate.   2.  Essential hypertension.  His blood pressure is higher today, possible because he stopped the lisinopril medication himself.   3.  Tobacco abuse.   6.  New finding in the left lower lobe as described above.  This is obviously of concern as the patient is a smoker.      PLAN:   1.  Firstly, we will obtain an event monitor to determine if the patient is having asymptomatic episodes of high-grade conduction system disease or arrhythmias.   2.  I am going to stop the metoprolol medication as this could be aggravating his syncopal episodes.  I will increase the dose of lisinopril from 20 mg per day to 40 mg per day and will recheck his blood pressure when he returns for the results of his event monitoring.   3.  We will obtain a stress echocardiogram to determine if there is any evidence of ischemia causing his syncopal episode.   4.  I know that you have ordered a carotid ultrasound, which will also include the vertebral arteries.  I have encouraged him to continue with that investigation.     5. He was  advised to stop smoking.     As I mentioned above, we will have the patient follow up in a month's time with all of our testing and again I reiterated to the patient how important it is for him to follow up with you with respect that  new finding in the left lower lobe, especially given that he is a smoker.        It has been my pleasure to be involved in the care of this very nice patient.      cc:   Jorge L Mullen MD    [facility]   [address] [place] [address]         DON CALL MD, [facility]             D: 2019   T: 2019   MT: JAMMIE      Name:     CHRYSTAL JACKSON   MRN:      [id number]        Account:      [id number]   :      1955           Service Date: 2019      Document: [id number]

## 2019-08-15 ENCOUNTER — HOSPITAL ENCOUNTER (OUTPATIENT)
Dept: CARDIOLOGY | Facility: CLINIC | Age: 64
Discharge: HOME | End: 2019-08-15
Attending: INTERNAL MEDICINE | Admitting: INTERNAL MEDICINE
Payer: COMMERCIAL

## 2019-08-15 DIAGNOSIS — R55 SYNCOPE, UNSPECIFIED SYNCOPE TYPE: ICD-10-CM

## 2019-08-15 PROCEDURE — 93270 REMOTE 30 DAY ECG REV/REPORT: CPT

## 2019-08-15 PROCEDURE — 93272 ECG/REVIEW INTERPRET ONLY: CPT | Performed by: INTERNAL MEDICINE

## 2019-08-19 ENCOUNTER — HOSPITAL ENCOUNTER (OUTPATIENT)
Dept: ULTRASOUND IMAGING | Facility: CLINIC | Age: 64
Discharge: HOME | End: 2019-08-19
Attending: FAMILY MEDICINE | Admitting: FAMILY MEDICINE
Payer: COMMERCIAL

## 2019-08-19 ENCOUNTER — THERAPY VISIT (OUTPATIENT)
Dept: PHYSICAL THERAPY | Facility: CLINIC | Age: 64
End: 2019-08-19
Payer: COMMERCIAL

## 2019-08-19 DIAGNOSIS — R55 SYNCOPE, UNSPECIFIED SYNCOPE TYPE: ICD-10-CM

## 2019-08-19 DIAGNOSIS — M54.50 ACUTE BILATERAL LOW BACK PAIN WITHOUT SCIATICA: ICD-10-CM

## 2019-08-19 PROCEDURE — 93880 EXTRACRANIAL BILAT STUDY: CPT

## 2019-08-19 PROCEDURE — 97110 THERAPEUTIC EXERCISES: CPT | Mod: GP | Performed by: PHYSICAL THERAPIST

## 2019-08-19 PROCEDURE — 97140 MANUAL THERAPY 1/> REGIONS: CPT | Mod: GP | Performed by: PHYSICAL THERAPIST

## 2019-08-20 ENCOUNTER — DOCUMENTATION ONLY (OUTPATIENT)
Dept: CARDIOLOGY | Facility: CLINIC | Age: 64
End: 2019-08-20

## 2019-08-28 ENCOUNTER — HOSPITAL ENCOUNTER (OUTPATIENT)
Dept: CARDIOLOGY | Facility: CLINIC | Age: 64
Discharge: HOME | End: 2019-08-28
Attending: INTERNAL MEDICINE | Admitting: INTERNAL MEDICINE
Payer: COMMERCIAL

## 2019-08-28 DIAGNOSIS — I25.10 CORONARY ARTERY DISEASE INVOLVING NATIVE CORONARY ARTERY OF NATIVE HEART WITHOUT ANGINA PECTORIS: ICD-10-CM

## 2019-08-28 DIAGNOSIS — R55 SYNCOPE, UNSPECIFIED SYNCOPE TYPE: ICD-10-CM

## 2019-08-28 PROCEDURE — 93321 DOPPLER ECHO F-UP/LMTD STD: CPT | Mod: 26 | Performed by: INTERNAL MEDICINE

## 2019-08-28 PROCEDURE — 40000264 ECHO STRESS ECHOCARDIOGRAM

## 2019-08-28 PROCEDURE — 93325 DOPPLER ECHO COLOR FLOW MAPG: CPT | Mod: 26 | Performed by: INTERNAL MEDICINE

## 2019-08-28 PROCEDURE — 93350 STRESS TTE ONLY: CPT | Mod: 26 | Performed by: INTERNAL MEDICINE

## 2019-08-28 PROCEDURE — 25500064 ZZH RX 255 OP 636: Performed by: INTERNAL MEDICINE

## 2019-08-28 PROCEDURE — 93018 CV STRESS TEST I&R ONLY: CPT | Performed by: INTERNAL MEDICINE

## 2019-08-28 PROCEDURE — 93016 CV STRESS TEST SUPVJ ONLY: CPT | Performed by: INTERNAL MEDICINE

## 2019-08-28 RX ADMIN — HUMAN ALBUMIN MICROSPHERES AND PERFLUTREN 6 ML: 10; .22 INJECTION, SOLUTION INTRAVENOUS at 09:30

## 2019-08-30 ENCOUNTER — THERAPY VISIT (OUTPATIENT)
Dept: PHYSICAL THERAPY | Facility: CLINIC | Age: 64
End: 2019-08-30
Payer: COMMERCIAL

## 2019-08-30 DIAGNOSIS — M54.50 ACUTE BILATERAL LOW BACK PAIN WITHOUT SCIATICA: ICD-10-CM

## 2019-08-30 PROCEDURE — 97110 THERAPEUTIC EXERCISES: CPT | Mod: GP | Performed by: PHYSICAL THERAPIST

## 2019-08-30 PROCEDURE — 97140 MANUAL THERAPY 1/> REGIONS: CPT | Mod: GP | Performed by: PHYSICAL THERAPIST

## 2019-09-12 ENCOUNTER — THERAPY VISIT (OUTPATIENT)
Dept: PHYSICAL THERAPY | Facility: CLINIC | Age: 64
End: 2019-09-12
Payer: COMMERCIAL

## 2019-09-12 DIAGNOSIS — M54.50 ACUTE BILATERAL LOW BACK PAIN WITHOUT SCIATICA: ICD-10-CM

## 2019-09-12 PROBLEM — M72.2 PLANTAR FASCIITIS: Status: RESOLVED | Noted: 2018-03-08 | Resolved: 2019-09-12

## 2019-09-12 PROCEDURE — 97110 THERAPEUTIC EXERCISES: CPT | Mod: GP | Performed by: PHYSICAL THERAPIST

## 2019-09-12 PROCEDURE — 97140 MANUAL THERAPY 1/> REGIONS: CPT | Mod: GP | Performed by: PHYSICAL THERAPIST

## 2019-09-13 NOTE — PROGRESS NOTES
Subjective:  HPI  Oswestry Score: 12 %                 Objective:  System    Physical Exam    General     ROS    Assessment/Plan:    DISCHARGE REPORT    Progress reporting period is from 8/13/19 to 9/12/19.       SUBJECTIVE  Subjective changes noted by patient:  Feels he is about 93% recovered.  If he feels it it is still middle or across.  Non radicular.  He's a little careful with lifting yet.  He did have one episode of pain when standing and bending to put jeans on and there was a twinge at that time--modified to do sitting and that has been fine.     Current Pain level: (0-1/10).     Initial Pain level: 8/10.   Changes in function:  Yes (See Goal flowsheet attached for changes in current functional level)  Adverse reaction to treatment or activity: None    OBJECTIVE  Changes noted in objective findings:  Yes,   Objective: Lumbar AROM flexion to toes no pain, ext trace pain end range, B SG WNL.  He is able to perform front and side planks (Front from toes and sides from knees) x 30 seconds each.      ASSESSMENT/PLAN  Updated problem list and treatment plan: Diagnosis 1:  Low back pain without radiation (Bilateral), derangement  Pain -  home program  Decreased ROM/flexibility - home program  Decreased strength - home program  Decreased proprioception - home program  Decreased function - home program  Impaired posture - home program  STG/LTGs have been met or progress has been made towards goals:  Yes (See Goal flow sheet completed today.)  Assessment of Progress: The patient's condition is improving.  Self Management Plans:  Patient has been instructed in a home treatment program.  I have re-evaluated this patient and find that the nature, scope, duration and intensity of the therapy is appropriate for the medical condition of the patient.  Christophe continues to require the following intervention to meet STG and LTG's:  PT intervention is no longer required to meet STG/LTG.    Recommendations:  This patient is  ready to be discharged from therapy and continue their home treatment program.    Please refer to the daily flowsheet for treatment today, total treatment time and time spent performing 1:1 timed codes.

## 2019-09-16 DIAGNOSIS — I25.10 CORONARY ARTERY DISEASE INVOLVING NATIVE CORONARY ARTERY OF NATIVE HEART WITHOUT ANGINA PECTORIS: ICD-10-CM

## 2019-09-16 RX ORDER — ATORVASTATIN CALCIUM 80 MG/1
80 TABLET, FILM COATED ORAL DAILY
Qty: 90 TABLET | Refills: 3 | Status: SHIPPED | OUTPATIENT
Start: 2019-09-16 | End: 2020-09-18

## 2019-09-17 ENCOUNTER — OFFICE VISIT (OUTPATIENT)
Dept: CARDIOLOGY | Facility: CLINIC | Age: 64
End: 2019-09-17
Attending: INTERNAL MEDICINE
Payer: COMMERCIAL

## 2019-09-17 VITALS
DIASTOLIC BLOOD PRESSURE: 70 MMHG | HEART RATE: 68 BPM | WEIGHT: 201 LBS | SYSTOLIC BLOOD PRESSURE: 150 MMHG | BODY MASS INDEX: 28.14 KG/M2 | HEIGHT: 71 IN

## 2019-09-17 DIAGNOSIS — I25.10 ASCVD (ARTERIOSCLEROTIC CARDIOVASCULAR DISEASE): Primary | ICD-10-CM

## 2019-09-17 DIAGNOSIS — R55 SYNCOPE, UNSPECIFIED SYNCOPE TYPE: ICD-10-CM

## 2019-09-17 DIAGNOSIS — I10 BENIGN ESSENTIAL HYPERTENSION: ICD-10-CM

## 2019-09-17 PROCEDURE — 99214 OFFICE O/P EST MOD 30 MIN: CPT | Performed by: NURSE PRACTITIONER

## 2019-09-17 RX ORDER — OLMESARTAN MEDOXOMIL 40 MG/1
40 TABLET ORAL DAILY
Qty: 90 TABLET | Refills: 3 | Status: SHIPPED | OUTPATIENT
Start: 2019-09-17 | End: 2020-09-18

## 2019-09-17 ASSESSMENT — MIFFLIN-ST. JEOR: SCORE: 1728.86

## 2019-09-17 NOTE — LETTER
9/17/2019    Jorge L Mullen MD  [address]  [facility] [id number]    RE: Christophe Del Rosario       Dear Colleague,    I had the pleasure of seeing Christophe Del Rosario in the [facility].    History of Present Illness:    Christophe Del Rosario is a 63 year old male followed here by Dr. Larry Sahni.  He has known coronary artery disease based on a coronary CT calcium score in January 2018 totaling 341 (226 in the LAD, 23 in the circumflex and 91 in the right coronary).    Over Memorial Day weekend he was working in his yard when it was hot and humid.  He came in and had several scotch drinks following this and had a cluster of syncopal spells and did not come to the hospital.  911 was called but he was not brought in for medical attention.  He seemed to do fine until early August when this happened again.  He was in his yard again and did not seek medical attention.  He states he had had no alcohol and was uncertain about the hydration factor at that point    When he saw Dr. Larry Sahni for follow-up his metoprolol was discontinued, lisinopril was increased to 40 mg/day.  He was sent for a stress echocardiogram, carotid ultrasound and wore an event recorder.    He is here today to review those results    In addition on his CT scan in January 2018 he had multiple lung nodules.  He was reimage this year and those nodules are stable however there is a new left lower lobe nodule that we now have deferred to primary care for further follow-up as he is a smoker.  He typically sees Dr. Jorge L Mullen who he has not seen recently but has seen a partner in the group.  I will message Dr. Mullen to be sure that the CT has follow-up.  I did remind the patient of this today.    Carotid ultrasound shows patent carotids with minimal plaque vertebrals are patent as are the external carotids  Event recorder shows an occasional PVC with adequate heart rates no arrhythmias to account for  syncope  Stress echocardiogram shows preserved LV function with no evidence of ischemia.  He has borderline elevated blood pressure at rest and with exercise.  He is quite fit and went 10 minutes on a treadmill with no chest discomfort or EKG changes    Blood pressure today is high.    He said no lightheadedness dizziness syncope or presyncope since he last saw his mid August.    Impression/Plan:     1.  Syncopal spells of uncertain etiology  -He is been off of metoprolol and on higher dose lisinopril  -All his above testing is unremarkable  -Review these with Dr. Larry Sahni to determine if any other follow-up is necessary or if we observe.    2.  Hypertension-uncontrolled  -Given his recent syncopal spells I will review a second agent for blood pressure with Dr. Larry Sahni and contact the patient and arrange follow-up appropriately    3.  Dyslipidemia adequately treated on Lipitor 80 mg/day  -LDL is 58 HDL 66 triglyceride 73    4.  Coronary artery disease by CT asymptomatic with negative stress echo  -Continue aspirin and risk factor modification    5.  Ongoing smoking history  -He is currently contemplating a cessation date    6.  Lung nodules found in 2018 were stable however there is new left lower lobe nodule.  We have deferred further follow-up to primary care and I will message Dr. Mullen about this.    Once have talked with Dr. Larry Sahni I will contact the patient with recommendations for treating his hypertension and follow-up care    ADDEDUM: discussed with Dr. Medellin  Stop Lisinopril and start Olmesartan 40mg daily  rx sent to [facility] [address]  See me in 6 weeks with BMP  Orders in and my chart message sent      It has been a pleasure seeing Christophe Del Rosario in follow up today  Greater than 50% of this 30 minute visit was spent in counseling    Maura Freire, MSN, APRN-BC, CNP  Cardiology    Orders Placed This Encounter   Procedures     Basic metabolic panel      Follow-Up with Cardiac Advanced Practice Provider     Orders Placed This Encounter   Medications     olmesartan (BENICAR) 40 MG tablet     Sig: Take 1 tablet (40 mg) by mouth daily     Dispense:  90 tablet     Refill:  3     Medications Discontinued During This Encounter   Medication Reason     lisinopril (PRINIVIL/ZESTRIL) 40 MG tablet          Encounter Diagnoses   Name Primary?     Syncope, unspecified syncope type      ASCVD (arteriosclerotic cardiovascular disease) Yes     Benign essential hypertension        CURRENT MEDICATIONS:  Current Outpatient Medications   Medication Sig Dispense Refill     olmesartan (BENICAR) 40 MG tablet Take 1 tablet (40 mg) by mouth daily 90 tablet 3     aspirin 81 MG chewable tablet Take 1 tablet (81 mg) by mouth daily 108 tablet 3     atorvastatin (LIPITOR) 80 MG tablet Take 1 tablet (80 mg) by mouth daily 90 tablet 3     HYDROcodone-acetaminophen (NORCO) 5-325 MG tablet Take 1 tablet by mouth every 6 hours as needed for severe pain (Patient not taking: Reported on 8/14/2019) 20 tablet 0       ALLERGIES   No Known Allergies    PAST MEDICAL HISTORY:  Past Medical History:   Diagnosis Date     ASCVD (arteriosclerotic cardiovascular disease)      Chest pain      HTN (hypertension) 2/27/2012       PAST SURGICAL HISTORY:  No past surgical history on file.    FAMILY HISTORY:  Family History   Problem Relation Age of Onset     Coronary Artery Disease Father      Cerebrovascular Disease Brother 53        small stroke       SOCIAL HISTORY:  Social History     Socioeconomic History     Marital status:      Spouse name: Flory in 1996     Number of children: None     Years of education: None     Highest education level: None   Occupational History     Occupation:      Employer: [employer]   Social Needs     Financial resource strain: None     Food insecurity:     Worry: None     Inability: None     Transportation needs:     Medical: None     Non-medical: None   Tobacco Use  "    Smoking status: Current Every Day Smoker     Packs/day: 0.10     Types: Cigarettes     Smokeless tobacco: Never Used     Tobacco comment: 1 a day    Substance and Sexual Activity     Alcohol use: Yes     Alcohol/week: 12.0 oz     Types: 20 Standard drinks or equivalent per week     Comment: 15-20 per week     Drug use: No     Sexual activity: Yes     Partners: Female   Lifestyle     Physical activity:     Days per week: None     Minutes per session: None     Stress: None   Relationships     Social connections:     Talks on phone: None     Gets together: None     Attends [employer] service: None     Active member of club or organization: None     Attends meetings of clubs or organizations: None     Relationship status: None     Intimate partner violence:     Fear of current or ex partner: None     Emotionally abused: None     Physically abused: None     Forced sexual activity: None   Other Topics Concern     Parent/sibling w/ CABG, MI or angioplasty before 65F 55M? Not Asked   Social History Narrative     None       Review of Systems:  Skin:  Negative       Eyes:  Positive for glasses;visual blurring due to med?  ENT:  Negative      Respiratory:  Negative       Cardiovascular:  Negative Positive for;lightheadedness;syncope or near-syncope syncopal episode possibly,fell and brokein May. 2-3 weeks ago passed out. due to medication?  Gastroenterology: Negative      Genitourinary:  Negative      Musculoskeletal:  Negative      Neurologic:  Negative      Psychiatric:  Negative      Heme/Lymph/Imm:  Negative      Endocrine:  Negative        Physical Exam:  Vitals: BP (!) 150/70   Pulse 68   Ht 1.803 m (5' 11\")   Wt 91.2 kg (201 lb)   BMI 28.03 kg/m       Constitutional:  cooperative, alert and oriented, well developed, well nourished, in no acute distress        Skin:  warm and dry to the touch, no apparent skin lesions or masses noted          Head:  normocephalic, no masses or lesions        Eyes:  pupils equal " and round, conjunctivae and lids unremarkable, sclera white, no xanthalasma, EOMS intact, no nystagmus        Lymph:No Cervical lymphadenopathy present     ENT:  no pallor or cyanosis        Neck:  carotid pulses are full and equal bilaterally, JVP normal, no carotid bruit        Respiratory:  normal breath sounds, clear to auscultation, normal A-P diameter, normal symmetry, normal respiratory excursion, no use of accessory muscles         Cardiac: regular rhythm, normal S1/S2, no S3 or S4, apical impulse not displaced, no murmurs, gallops or rubs occasional premature beats              pulses full and equal                                        GI:  not assessed this visit        Extremities and Muscular Skeletal:  no deformities, clubbing, cyanosis, erythema observed;no edema              Neurological:  no gross motor deficits;affect appropriate        Psych:  Alert and Oriented x 3      Recent Lab Results:  LIPID RESULTS:  Lab Results   Component Value Date    CHOL 139 08/08/2019    HDL 66 08/08/2019    LDL 58 08/08/2019    TRIG 73 08/08/2019       LIVER ENZYME RESULTS:  Lab Results   Component Value Date    AST 27 01/12/2018    ALT 6 08/08/2019       CBC RESULTS:  Lab Results   Component Value Date    WBC 7.5 07/02/2018    WBC 9.1 09/04/2005    RBC 4.62 07/02/2018    RBC 4.54 09/04/2005    HGB 15.3 07/02/2018    HCT 45.9 07/02/2018    MCV 99.4 07/02/2018    MCH 33.1 (A) 07/02/2018    MCHC 33.3 07/02/2018    RDW 12.8 09/04/2005     07/02/2018       BMP RESULTS:  Lab Results   Component Value Date     08/08/2019    POTASSIUM 4.1 08/08/2019    CHLORIDE 104 08/08/2019    CO2 26 08/08/2019    ANIONGAP 13.1 08/08/2019     (H) 08/08/2019    BUN 21 08/08/2019    CR 1.02 08/08/2019    GFRESTIMATED 74 08/08/2019    GFRESTBLACK 89 08/08/2019    WILLY 9.0 08/08/2019        A1C RESULTS:  No results found for: A1C    INR RESULTS:  No results found for: INR          Thank you for allowing me to participate  in the care of your patient.    Sincerely,     CARA Farias [facility]

## 2019-09-17 NOTE — LETTER
9/17/2019    Jorge L Mullen MD  [address]  [facility] [address]    RE: Christophe Del Rosario       Dear Colleague,    I had the pleasure of seeing Christophe Del Rosario in the [facility].    History of Present Illness:    Christophe Del Rosario is a 63 year old male followed here by Dr. Larry Sahni.  He has known coronary artery disease based on a coronary CT calcium score in January 2018 totaling 341 (226 in the LAD, 23 in the circumflex and 91 in the right coronary).    Over Memorial Day weekend he was working in his yard when it was hot and humid.  He came in and had several scotch drinks following this and had a cluster of syncopal spells and did not come to the hospital.  911 was called but he was not brought in for medical attention.  He seemed to do fine until early August when this happened again.  He was in his yard again and did not seek medical attention.  He states he had had no alcohol and was uncertain about the hydration factor at that point    When he saw Dr. Larry Sahni for follow-up his metoprolol was discontinued, lisinopril was increased to 40 mg/day.  He was sent for a stress echocardiogram, carotid ultrasound and wore an event recorder.    He is here today to review those results    In addition on his CT scan in January 2018 he had multiple lung nodules.  He was reimage this year and those nodules are stable however there is a new left lower lobe nodule that we now have deferred to primary care for further follow-up as he is a smoker.  He typically sees Dr. Jorge L Mullen who he has not seen recently but has seen a partner in the group.  I will message Dr. Mullen to be sure that the CT has follow-up.  I did remind the patient of this today.    Carotid ultrasound shows patent carotids with minimal plaque vertebrals are patent as are the external carotids  Event recorder shows an occasional PVC with adequate heart rates no arrhythmias to account for  syncope  Stress echocardiogram shows preserved LV function with no evidence of ischemia.  He has borderline elevated blood pressure at rest and with exercise.  He is quite fit and went 10 minutes on a treadmill with no chest discomfort or EKG changes    Blood pressure today is high.    He said no lightheadedness dizziness syncope or presyncope since he last saw his mid August.    Impression/Plan:     1.  Syncopal spells of uncertain etiology  -He is been off of metoprolol and on higher dose lisinopril  -All his above testing is unremarkable  -Review these with Dr. Larry Sahni to determine if any other follow-up is necessary or if we observe.    2.  Hypertension-uncontrolled  -Given his recent syncopal spells I will review a second agent for blood pressure with Dr. Larry Sahni and contact the patient and arrange follow-up appropriately    3.  Dyslipidemia adequately treated on Lipitor 80 mg/day  -LDL is 58 HDL 66 triglyceride 73    4.  Coronary artery disease by CT asymptomatic with negative stress echo  -Continue aspirin and risk factor modification    5.  Ongoing smoking history  -He is currently contemplating a cessation date    6.  Lung nodules found in 2018 were stable however there is new left lower lobe nodule.  We have deferred further follow-up to primary care and I will message Dr. Mullen about this.    Once have talked with Dr. Larry Sahni I will contact the patient with recommendations for treating his hypertension and follow-up care    ADDEDUM: discussed with Dr. Medellin  Stop Lisinopril and start Olmesartan 40mg daily  rx sent to [facility] [address]  See me in 6 weeks with BMP  Orders in and my chart message sent      It has been a pleasure seeing Christophe Del Rosario in follow up today  Greater than 50% of this 30 minute visit was spent in counseling    Maura Freire, MSN, APRN-BC, CNP  Cardiology    Orders Placed This Encounter   Procedures     Basic metabolic panel      Follow-Up with Cardiac Advanced Practice Provider     Orders Placed This Encounter   Medications     olmesartan (BENICAR) 40 MG tablet     Sig: Take 1 tablet (40 mg) by mouth daily     Dispense:  90 tablet     Refill:  3     Medications Discontinued During This Encounter   Medication Reason     lisinopril (PRINIVIL/ZESTRIL) 40 MG tablet          Encounter Diagnoses   Name Primary?     Syncope, unspecified syncope type      ASCVD (arteriosclerotic cardiovascular disease) Yes     Benign essential hypertension        CURRENT MEDICATIONS:  Current Outpatient Medications   Medication Sig Dispense Refill     olmesartan (BENICAR) 40 MG tablet Take 1 tablet (40 mg) by mouth daily 90 tablet 3     aspirin 81 MG chewable tablet Take 1 tablet (81 mg) by mouth daily 108 tablet 3     atorvastatin (LIPITOR) 80 MG tablet Take 1 tablet (80 mg) by mouth daily 90 tablet 3     HYDROcodone-acetaminophen (NORCO) 5-325 MG tablet Take 1 tablet by mouth every 6 hours as needed for severe pain (Patient not taking: Reported on 8/14/2019) 20 tablet 0       ALLERGIES   No Known Allergies    PAST MEDICAL HISTORY:  Past Medical History:   Diagnosis Date     ASCVD (arteriosclerotic cardiovascular disease)      Chest pain      HTN (hypertension) 2/27/2012       PAST SURGICAL HISTORY:  No past surgical history on file.    FAMILY HISTORY:  Family History   Problem Relation Age of Onset     Coronary Artery Disease Father      Cerebrovascular Disease Brother 53        small stroke       SOCIAL HISTORY:  Social History     Socioeconomic History     Marital status:      Spouse name: Flory in 1996     Number of children: None     Years of education: None     Highest education level: None   Occupational History     Occupation:      Employer: [employer]   Social Needs     Financial resource strain: None     Food insecurity:     Worry: None     Inability: None     Transportation needs:     Medical: None     Non-medical: None   Tobacco Use  "    Smoking status: Current Every Day Smoker     Packs/day: 0.10     Types: Cigarettes     Smokeless tobacco: Never Used     Tobacco comment: 1 a day    Substance and Sexual Activity     Alcohol use: Yes     Alcohol/week: 12.0 oz     Types: 20 Standard drinks or equivalent per week     Comment: 15-20 per week     Drug use: No     Sexual activity: Yes     Partners: Female   Lifestyle     Physical activity:     Days per week: None     Minutes per session: None     Stress: None   Relationships     Social connections:     Talks on phone: None     Gets together: None     Attends Sikh service: None     Active member of club or organization: None     Attends meetings of clubs or organizations: None     Relationship status: None     Intimate partner violence:     Fear of current or ex partner: None     Emotionally abused: None     Physically abused: None     Forced sexual activity: None   Other Topics Concern     Parent/sibling w/ CABG, MI or angioplasty before 65F 55M? Not Asked   Social History Narrative     None       Review of Systems:  Skin:  Negative       Eyes:  Positive for glasses;visual blurring due to med?  ENT:  Negative      Respiratory:  Negative       Cardiovascular:  Negative Positive for;lightheadedness;syncope or near-syncope syncopal episode possibly,fell and brokein May. 2-3 weeks ago passed out. due to medication?  Gastroenterology: Negative      Genitourinary:  Negative      Musculoskeletal:  Negative      Neurologic:  Negative      Psychiatric:  Negative      Heme/Lymph/Imm:  Negative      Endocrine:  Negative        Physical Exam:  Vitals: BP (!) 150/70   Pulse 68   Ht 1.803 m (5' 11\")   Wt 91.2 kg (201 lb)   BMI 28.03 kg/m       Constitutional:  cooperative, alert and oriented, well developed, well nourished, in no acute distress        Skin:  warm and dry to the touch, no apparent skin lesions or masses noted          Head:  normocephalic, no masses or lesions        Eyes:  pupils equal " and round, conjunctivae and lids unremarkable, sclera white, no xanthalasma, EOMS intact, no nystagmus        Lymph:No Cervical lymphadenopathy present     ENT:  no pallor or cyanosis        Neck:  carotid pulses are full and equal bilaterally, JVP normal, no carotid bruit        Respiratory:  normal breath sounds, clear to auscultation, normal A-P diameter, normal symmetry, normal respiratory excursion, no use of accessory muscles         Cardiac: regular rhythm, normal S1/S2, no S3 or S4, apical impulse not displaced, no murmurs, gallops or rubs occasional premature beats              pulses full and equal                                        GI:  not assessed this visit        Extremities and Muscular Skeletal:  no deformities, clubbing, cyanosis, erythema observed;no edema              Neurological:  no gross motor deficits;affect appropriate        Psych:  Alert and Oriented x 3      Recent Lab Results:  LIPID RESULTS:  Lab Results   Component Value Date    CHOL 139 08/08/2019    HDL 66 08/08/2019    LDL 58 08/08/2019    TRIG 73 08/08/2019       LIVER ENZYME RESULTS:  Lab Results   Component Value Date    AST 27 01/12/2018    ALT 6 08/08/2019       CBC RESULTS:  Lab Results   Component Value Date    WBC 7.5 07/02/2018    WBC 9.1 09/04/2005    RBC 4.62 07/02/2018    RBC 4.54 09/04/2005    HGB 15.3 07/02/2018    HCT 45.9 07/02/2018    MCV 99.4 07/02/2018    MCH 33.1 (A) 07/02/2018    MCHC 33.3 07/02/2018    RDW 12.8 09/04/2005     07/02/2018       BMP RESULTS:  Lab Results   Component Value Date     08/08/2019    POTASSIUM 4.1 08/08/2019    CHLORIDE 104 08/08/2019    CO2 26 08/08/2019    ANIONGAP 13.1 08/08/2019     (H) 08/08/2019    BUN 21 08/08/2019    CR 1.02 08/08/2019    GFRESTIMATED 74 08/08/2019    GFRESTBLACK 89 08/08/2019    WILLY 9.0 08/08/2019        A1C RESULTS:  No results found for: A1C    INR RESULTS:  No results found for: INR        CC  Reji Lalo Medellin MD  [id number]  [place] [id number]                  Thank you for allowing me to participate in the care of your patient.      Sincerely,     CARA Farias [facility]    cc:   Reji Medellin MD  [address]  [place] [id number]

## 2019-09-17 NOTE — PATIENT INSTRUCTIONS
Call [facility] if your lipitor is not at your pharmacy  [phone]    Talk with Dr. Mullen about a follow up CT of your chest.

## 2019-09-17 NOTE — PROGRESS NOTES
History of Present Illness:    Christophe Del Rosario is a 63 year old male followed here by Dr. Larry Sahni.  He has known coronary artery disease based on a coronary CT calcium score in January 2018 totaling 341 (226 in the LAD, 23 in the circumflex and 91 in the right coronary).    Over Memorial Day weekend he was working in his yard when it was hot and humid.  He came in and had several scotch drinks following this and had a cluster of syncopal spells and did not come to the hospital.  911 was called but he was not brought in for medical attention.  He seemed to do fine until early August when this happened again.  He was in his yard again and did not seek medical attention.  He states he had had no alcohol and was uncertain about the hydration factor at that point    When he saw Dr. Larry Sahni for follow-up his metoprolol was discontinued, lisinopril was increased to 40 mg/day.  He was sent for a stress echocardiogram, carotid ultrasound and wore an event recorder.    He is here today to review those results    In addition on his CT scan in January 2018 he had multiple lung nodules.  He was reimage this year and those nodules are stable however there is a new left lower lobe nodule that we now have deferred to primary care for further follow-up as he is a smoker.  He typically sees Dr. Jorge L Mullen who he has not seen recently but has seen a partner in the group.  I will message Dr. Mullen to be sure that the CT has follow-up.  I did remind the patient of this today.    Carotid ultrasound shows patent carotids with minimal plaque vertebrals are patent as are the external carotids  Event recorder shows an occasional PVC with adequate heart rates no arrhythmias to account for syncope  Stress echocardiogram shows preserved LV function with no evidence of ischemia.  He has borderline elevated blood pressure at rest and with exercise.  He is quite fit and went 10 minutes on a treadmill with no chest discomfort or EKG  changes    Blood pressure today is high.    He said no lightheadedness dizziness syncope or presyncope since he last saw his mid August.    Impression/Plan:     1.  Syncopal spells of uncertain etiology  -He is been off of metoprolol and on higher dose lisinopril  -All his above testing is unremarkable  -Review these with Dr. Larry Sahni to determine if any other follow-up is necessary or if we observe.    2.  Hypertension-uncontrolled  -Given his recent syncopal spells I will review a second agent for blood pressure with Dr. Larry Sahni and contact the patient and arrange follow-up appropriately    3.  Dyslipidemia adequately treated on Lipitor 80 mg/day  -LDL is 58 HDL 66 triglyceride 73    4.  Coronary artery disease by CT asymptomatic with negative stress echo  -Continue aspirin and risk factor modification    5.  Ongoing smoking history  -He is currently contemplating a cessation date    6.  Lung nodules found in 2018 were stable however there is new left lower lobe nodule.  We have deferred further follow-up to primary care and I will message Dr. Mullen about this.    Once have talked with Dr. Larry Sahni I will contact the patient with recommendations for treating his hypertension and follow-up care    ADDEDUM: discussed with Dr. Medellin  Stop Lisinopril and start Olmesartan 40mg daily  rx sent to [facility] [address]  See me in 6 weeks with BMP  Orders in and my chart message sent      It has been a pleasure seeing Christophe Del Rosario in follow up today  Greater than 50% of this 30 minute visit was spent in counseling    Maura Freire, MSN, APRN-BC, CNP  Cardiology    Orders Placed This Encounter   Procedures     Basic metabolic panel     Follow-Up with Cardiac Advanced Practice Provider     Orders Placed This Encounter   Medications     olmesartan (BENICAR) 40 MG tablet     Sig: Take 1 tablet (40 mg) by mouth daily     Dispense:  90 tablet     Refill:  3     Medications Discontinued  During This Encounter   Medication Reason     lisinopril (PRINIVIL/ZESTRIL) 40 MG tablet          Encounter Diagnoses   Name Primary?     Syncope, unspecified syncope type      ASCVD (arteriosclerotic cardiovascular disease) Yes     Benign essential hypertension        CURRENT MEDICATIONS:  Current Outpatient Medications   Medication Sig Dispense Refill     olmesartan (BENICAR) 40 MG tablet Take 1 tablet (40 mg) by mouth daily 90 tablet 3     aspirin 81 MG chewable tablet Take 1 tablet (81 mg) by mouth daily 108 tablet 3     atorvastatin (LIPITOR) 80 MG tablet Take 1 tablet (80 mg) by mouth daily 90 tablet 3     HYDROcodone-acetaminophen (NORCO) 5-325 MG tablet Take 1 tablet by mouth every 6 hours as needed for severe pain (Patient not taking: Reported on 8/14/2019) 20 tablet 0       ALLERGIES   No Known Allergies    PAST MEDICAL HISTORY:  Past Medical History:   Diagnosis Date     ASCVD (arteriosclerotic cardiovascular disease)      Chest pain      HTN (hypertension) 2/27/2012       PAST SURGICAL HISTORY:  No past surgical history on file.    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     Employer: [employer]   Social Needs     Financial resource strain: None     Food insecurity:     Worry: None     Inability: None     Transportation needs:     Medical: None     Non-medical: None   Tobacco Use     Smoking status: Current Every Day Smoker     Packs/day: 0.10     Types: Cigarettes     Smokeless tobacco: Never Used     Tobacco comment: 1 a day    Substance and Sexual Activity     Alcohol use: Yes     Alcohol/week: 12.0 oz     Types: 20 Standard  "drinks or equivalent per week     Comment: 15-20 per week     Drug use: No     Sexual activity: Yes     Partners: Female   Lifestyle     Physical activity:     Days per week: None     Minutes per session: None     Stress: None   Relationships     Social connections:     Talks on phone: None     Gets together: None     Attends Spiritism service: None     Active member of club or organization: None     Attends meetings of clubs or organizations: None     Relationship status: None     Intimate partner violence:     Fear of current or ex partner: None     Emotionally abused: None     Physically abused: None     Forced sexual activity: None   Other Topics Concern     Parent/sibling w/ CABG, MI or angioplasty before 65F 55M? Not Asked   Social History Narrative     None       Review of Systems:  Skin:  Negative       Eyes:  Positive for glasses;visual blurring due to med?  ENT:  Negative      Respiratory:  Negative       Cardiovascular:  Negative Positive for;lightheadedness;syncope or near-syncope syncopal episode possibly,fell and brokein May. 2-3 weeks ago passed out. due to medication?  Gastroenterology: Negative      Genitourinary:  Negative      Musculoskeletal:  Negative      Neurologic:  Negative      Psychiatric:  Negative      Heme/Lymph/Imm:  Negative      Endocrine:  Negative        Physical Exam:  Vitals: BP (!) 150/70   Pulse 68   Ht 1.803 m (5' 11\")   Wt 91.2 kg (201 lb)   BMI 28.03 kg/m      Constitutional:  cooperative, alert and oriented, well developed, well nourished, in no acute distress        Skin:  warm and dry to the touch, no apparent skin lesions or masses noted          Head:  normocephalic, no masses or lesions        Eyes:  pupils equal and round, conjunctivae and lids unremarkable, sclera white, no xanthalasma, EOMS intact, no nystagmus        Lymph:No Cervical lymphadenopathy present     ENT:  no pallor or cyanosis        Neck:  carotid pulses are full and equal bilaterally, JVP normal, " no carotid bruit        Respiratory:  normal breath sounds, clear to auscultation, normal A-P diameter, normal symmetry, normal respiratory excursion, no use of accessory muscles         Cardiac: regular rhythm, normal S1/S2, no S3 or S4, apical impulse not displaced, no murmurs, gallops or rubs occasional premature beats              pulses full and equal                                        GI:  not assessed this visit        Extremities and Muscular Skeletal:  no deformities, clubbing, cyanosis, erythema observed;no edema              Neurological:  no gross motor deficits;affect appropriate        Psych:  Alert and Oriented x 3      Recent Lab Results:  LIPID RESULTS:  Lab Results   Component Value Date    CHOL 139 08/08/2019    HDL 66 08/08/2019    LDL 58 08/08/2019    TRIG 73 08/08/2019       LIVER ENZYME RESULTS:  Lab Results   Component Value Date    AST 27 01/12/2018    ALT 6 08/08/2019       CBC RESULTS:  Lab Results   Component Value Date    WBC 7.5 07/02/2018    WBC 9.1 09/04/2005    RBC 4.62 07/02/2018    RBC 4.54 09/04/2005    HGB 15.3 07/02/2018    HCT 45.9 07/02/2018    MCV 99.4 07/02/2018    MCH 33.1 (A) 07/02/2018    MCHC 33.3 07/02/2018    RDW 12.8 09/04/2005     07/02/2018       BMP RESULTS:  Lab Results   Component Value Date     08/08/2019    POTASSIUM 4.1 08/08/2019    CHLORIDE 104 08/08/2019    CO2 26 08/08/2019    ANIONGAP 13.1 08/08/2019     (H) 08/08/2019    BUN 21 08/08/2019    CR 1.02 08/08/2019    GFRESTIMATED 74 08/08/2019    GFRESTBLACK 89 08/08/2019    WILLY 9.0 08/08/2019        A1C RESULTS:  No results found for: A1C    INR RESULTS:  No results found for: INR        CC  Reji Medellin MD  [address]  [place] [id number]

## 2019-10-02 ENCOUNTER — HEALTH MAINTENANCE LETTER (OUTPATIENT)
Age: 64
End: 2019-10-02

## 2019-12-04 ENCOUNTER — TRANSFERRED RECORDS (OUTPATIENT)
Dept: FAMILY MEDICINE | Facility: CLINIC | Age: 64
End: 2019-12-04

## 2019-12-16 ENCOUNTER — TELEPHONE (OUTPATIENT)
Dept: FAMILY MEDICINE | Facility: CLINIC | Age: 64
End: 2019-12-16

## 2019-12-16 ENCOUNTER — CARE COORDINATION (OUTPATIENT)
Dept: CARDIOLOGY | Facility: CLINIC | Age: 64
End: 2019-12-16

## 2019-12-16 DIAGNOSIS — R91.8 PULMONARY NODULES: Primary | ICD-10-CM

## 2019-12-16 NOTE — TELEPHONE ENCOUNTER
Patient calls to get order for CT chest to be done to follow up new nodular area noted on 8/8/19 CT chest done at [facility].   Per CT chest done 8/8/19: New area of nodular groundglass opacity in the left lower lobe  measuring approximately 7 mm. Recommend close surveillance with  follow-up CT examination 3-6 months. Etiology is nonspecific as it may  be inflammatory, infectious, among other etiologies, though reason for  close surveillance.    Plan: per Dr. Mullen order placed for CT chest in EPIC with  Radiology. Patient will follow up to schedule.   Lili Paulino RN

## 2019-12-16 NOTE — PROGRESS NOTES
Received call from pt stating that he needs to repeat the CT scan that showed lung nodules. Pt advised per last office note, Dr. Medellin recommended he follow up w/ PCP to follow up re: lung nodules. Nanci BARTON

## 2019-12-23 ENCOUNTER — HOSPITAL ENCOUNTER (OUTPATIENT)
Dept: CT IMAGING | Facility: CLINIC | Age: 64
Discharge: HOME | End: 2019-12-23
Attending: FAMILY MEDICINE | Admitting: FAMILY MEDICINE
Payer: COMMERCIAL

## 2019-12-23 DIAGNOSIS — R91.8 PULMONARY NODULES: ICD-10-CM

## 2019-12-23 PROCEDURE — 71250 CT THORAX DX C-: CPT

## 2020-01-31 ENCOUNTER — OFFICE VISIT (OUTPATIENT)
Dept: CARDIOLOGY | Facility: CLINIC | Age: 65
End: 2020-01-31
Payer: COMMERCIAL

## 2020-01-31 VITALS
SYSTOLIC BLOOD PRESSURE: 150 MMHG | WEIGHT: 203.6 LBS | HEART RATE: 60 BPM | HEIGHT: 71 IN | BODY MASS INDEX: 28.5 KG/M2 | DIASTOLIC BLOOD PRESSURE: 84 MMHG

## 2020-01-31 DIAGNOSIS — I25.10 CORONARY ARTERY DISEASE INVOLVING NATIVE CORONARY ARTERY OF NATIVE HEART WITHOUT ANGINA PECTORIS: ICD-10-CM

## 2020-01-31 DIAGNOSIS — I10 BENIGN ESSENTIAL HYPERTENSION: ICD-10-CM

## 2020-01-31 DIAGNOSIS — E78.00 PURE HYPERCHOLESTEROLEMIA: ICD-10-CM

## 2020-01-31 DIAGNOSIS — I10 ESSENTIAL HYPERTENSION: ICD-10-CM

## 2020-01-31 DIAGNOSIS — R55 SYNCOPE, UNSPECIFIED SYNCOPE TYPE: Primary | ICD-10-CM

## 2020-01-31 DIAGNOSIS — I25.10 ASCVD (ARTERIOSCLEROTIC CARDIOVASCULAR DISEASE): ICD-10-CM

## 2020-01-31 LAB
ANION GAP SERPL CALCULATED.3IONS-SCNC: 9.2 MMOL/L (ref 6–17)
BUN SERPL-MCNC: 29 MG/DL (ref 7–30)
CALCIUM SERPL-MCNC: 8.7 MG/DL (ref 8.5–10.5)
CHLORIDE SERPL-SCNC: 106 MMOL/L (ref 98–107)
CO2 SERPL-SCNC: 29 MMOL/L (ref 23–29)
CREAT SERPL-MCNC: 1.28 MG/DL (ref 0.7–1.3)
GFR SERPL CREATININE-BSD FRML MDRD: 57 ML/MIN/{1.73_M2}
GLUCOSE SERPL-MCNC: 115 MG/DL (ref 70–105)
POTASSIUM SERPL-SCNC: 4.2 MMOL/L (ref 3.5–5.1)
SODIUM SERPL-SCNC: 140 MMOL/L (ref 136–145)

## 2020-01-31 PROCEDURE — 99213 OFFICE O/P EST LOW 20 MIN: CPT | Performed by: INTERNAL MEDICINE

## 2020-01-31 PROCEDURE — 36415 COLL VENOUS BLD VENIPUNCTURE: CPT | Performed by: NURSE PRACTITIONER

## 2020-01-31 PROCEDURE — 80048 BASIC METABOLIC PNL TOTAL CA: CPT | Performed by: NURSE PRACTITIONER

## 2020-01-31 RX ORDER — HYDROCHLOROTHIAZIDE 12.5 MG/1
12.5 TABLET ORAL DAILY
Qty: 30 TABLET | Refills: 11 | Status: SHIPPED | OUTPATIENT
Start: 2020-01-31 | End: 2020-02-12

## 2020-01-31 ASSESSMENT — MIFFLIN-ST. JEOR: SCORE: 1735.65

## 2020-01-31 NOTE — PROGRESS NOTES
Service Date: 01/31/2020      HISTORY OF PRESENT ILLNESS:  It is my pleasure to see your patient, Christophe Del Rosario.  This is a patient with a history of coronary artery disease based upon a calcium scanning.  This patient also has a history of essential hypertension.  He also had pulmonary nodules on CT scan.  The nodules have been followed by Dr. Jorge L Mullen, and by all accounts and repeat CT, there has been no progression of his nodules.        The patient has noted from visits here and visits to his primary care physician, that his blood pressure is raised.  In looking back at his blood pressures, indeed, they are on the higher side  today, his blood pressure is 150/84.      If you remember, this patient was having syncopal episodes.  Since the elimination of metoprolol, he has had no further episodes of syncope or near-syncope.        His renal function today shows his creatinine is 1.28, BUN is 29.  GFR is mildly reduced at 57.  The sodium and potassium were normal at 140 and 4.2.        His last lipid profile was in August and his LDL was excellent at 58, HDL 66 and triglycerides 73, total cholesterol was 139.      IMPRESSION:   1.  Syncope.  There has been no further syncope since the elimination of metoprolol.   2.  Pulmonary nodules.  These have been followed by Dr. Jorge L Mullen and by all accounts are stable.   3.  Coronary artery disease.  The patient is asymptomatic with respect to coronary artery disease with no symptoms suggestive of angina pectoris.   4.  Excellent lipid profile based on lipids in August.   5.  Essential hypertension.  His blood pressure does not appear to be well controlled.      PLAN:  We will add in hydrochlorothiazide 12.5 mg per day.  We will have the patient return in 2 weeks' time with a nurse visit and we will check a BMP and a blood pressure to see if his blood pressure is coming under control.        I will have the patient follow up with Mikaela Freire in 6 months'  time with lipids and basic metabolic profile and he will follow up with me in a year's time, also with a basic metabolic profile and lipids.      As always, the patient is told to contact me with any questions or any concerns.      cc:      Jorge L Mullen MD    [facility]   [address] [place] [address]         DON CALL MD, [facility]             D: 2020   T: 2020   MT: [id number]      Name:     CHRYSTAL JACKSON   MRN:      [id number]        Account:      [id number]   :      1955           Service Date: 2020      Document: [id number]

## 2020-01-31 NOTE — PROGRESS NOTES
HPI and Plan:   See dictation    Orders Placed This Encounter   Procedures     Basic metabolic panel     Lipid Profile     ALT     Basic metabolic panel     Lipid Profile     ALT     Follow-Up with Nurse     Follow-Up with Cardiologist     Follow-Up with Cardiologist       Orders Placed This Encounter   Medications     hydrochlorothiazide (HYDRODIURIL) 12.5 MG tablet     Sig: Take 1 tablet (12.5 mg) by mouth daily     Dispense:  30 tablet     Refill:  11       There are no discontinued medications.      Encounter Diagnoses   Name Primary?     Syncope, unspecified syncope type Yes     ASCVD (arteriosclerotic cardiovascular disease)      Coronary artery disease involving native coronary artery of native heart without angina pectoris      Essential hypertension      Benign essential hypertension      Pure hypercholesterolemia        CURRENT MEDICATIONS:  Current Outpatient Medications   Medication Sig Dispense Refill     aspirin 81 MG chewable tablet Take 1 tablet (81 mg) by mouth daily 108 tablet 3     atorvastatin (LIPITOR) 80 MG tablet Take 1 tablet (80 mg) by mouth daily 90 tablet 3     hydrochlorothiazide (HYDRODIURIL) 12.5 MG tablet Take 1 tablet (12.5 mg) by mouth daily 30 tablet 11     olmesartan (BENICAR) 40 MG tablet Take 1 tablet (40 mg) by mouth daily 90 tablet 3     HYDROcodone-acetaminophen (NORCO) 5-325 MG tablet Take 1 tablet by mouth every 6 hours as needed for severe pain (Patient not taking: Reported on 8/14/2019) 20 tablet 0       ALLERGIES   No Known Allergies    PAST MEDICAL HISTORY:  Past Medical History:   Diagnosis Date     ASCVD (arteriosclerotic cardiovascular disease)      Chest pain      HTN (hypertension) 2/27/2012       PAST SURGICAL HISTORY:  History reviewed. No pertinent surgical history.    FAMILY HISTORY:  Family History   Problem Relation Age of Onset     Coronary Artery Disease Father      Cerebrovascular Disease Brother 53        small stroke       SOCIAL HISTORY:  Social History      Socioeconomic History     Marital status:      Spouse name: Flory in 1996     Number of children: None     Years of education: None     Highest education level: None   Occupational History     Occupation:      Employer: [employer]   Social Needs     Financial resource strain: None     Food insecurity:     Worry: None     Inability: None     Transportation needs:     Medical: None     Non-medical: None   Tobacco Use     Smoking status: Current Every Day Smoker     Packs/day: 0.10     Types: Cigarettes     Smokeless tobacco: Never Used     Tobacco comment: 3 or 4 cig a day    Substance and Sexual Activity     Alcohol use: Yes     Alcohol/week: 20.0 standard drinks     Types: 20 Standard drinks or equivalent per week     Comment: 15-20 per week     Drug use: No     Sexual activity: Yes     Partners: Female   Lifestyle     Physical activity:     Days per week: None     Minutes per session: None     Stress: None   Relationships     Social connections:     Talks on phone: None     Gets together: None     Attends Anabaptist service: None     Active member of club or organization: None     Attends meetings of clubs or organizations: None     Relationship status: None     Intimate partner violence:     Fear of current or ex partner: None     Emotionally abused: None     Physically abused: None     Forced sexual activity: None   Other Topics Concern     Parent/sibling w/ CABG, MI or angioplasty before 65F 55M? Not Asked   Social History Narrative     None       Review of Systems:  Skin:  Negative       Eyes:  Positive for glasses;visual blurring due to med?  ENT:  Negative      Respiratory:  Negative       Cardiovascular:  Negative   no lightheaded or syncopal episodes since med changes  Gastroenterology: Negative      Genitourinary:  Negative      Musculoskeletal:  Negative      Neurologic:  Negative      Psychiatric:  Negative      Heme/Lymph/Imm:  Negative      Endocrine:  Negative        Physical  "Exam:  Vitals: BP (!) 150/84   Pulse 60   Ht 1.803 m (5' 11\")   Wt 92.4 kg (203 lb 9.6 oz)   BMI 28.40 kg/m      Constitutional:  cooperative, alert and oriented, well developed, well nourished, in no acute distress        Skin:  warm and dry to the touch, no apparent skin lesions or masses noted          Head:  normocephalic, no masses or lesions        Eyes:  pupils equal and round, conjunctivae and lids unremarkable, sclera white, no xanthalasma, EOMS intact, no nystagmus        Lymph:No Cervical lymphadenopathy present     ENT:  no pallor or cyanosis        Neck:  carotid pulses are full and equal bilaterally, JVP normal, no carotid bruit        Respiratory:  normal breath sounds, clear to auscultation, normal A-P diameter, normal symmetry, normal respiratory excursion, no use of accessory muscles         Cardiac: regular rhythm, normal S1/S2, no S3 or S4, apical impulse not displaced, no murmurs, gallops or rubs occasional premature beats              pulses full and equal                                        GI:  not assessed this visit        Extremities and Muscular Skeletal:  no deformities, clubbing, cyanosis, erythema observed;no edema              Neurological:  no gross motor deficits;affect appropriate        Psych:  Alert and Oriented x 3        CC  Jorge L Mullen MD  [address] [place] [address]              "

## 2020-01-31 NOTE — LETTER
1/31/2020    Jorge L Mullen MD  [address] [place] [address]    RE: Christophe Del Rosario       Dear Colleague,    I had the pleasure of seeing Christophe Del Rosario in the [facility].    HPI and Plan:   See dictation    Orders Placed This Encounter   Procedures     Basic metabolic panel     Lipid Profile     ALT     Basic metabolic panel     Lipid Profile     ALT     Follow-Up with Nurse     Follow-Up with Cardiologist     Follow-Up with Cardiologist       Orders Placed This Encounter   Medications     hydrochlorothiazide (HYDRODIURIL) 12.5 MG tablet     Sig: Take 1 tablet (12.5 mg) by mouth daily     Dispense:  30 tablet     Refill:  11       There are no discontinued medications.      Encounter Diagnoses   Name Primary?     Syncope, unspecified syncope type Yes     ASCVD (arteriosclerotic cardiovascular disease)      Coronary artery disease involving native coronary artery of native heart without angina pectoris      Essential hypertension      Benign essential hypertension      Pure hypercholesterolemia        CURRENT MEDICATIONS:  Current Outpatient Medications   Medication Sig Dispense Refill     aspirin 81 MG chewable tablet Take 1 tablet (81 mg) by mouth daily 108 tablet 3     atorvastatin (LIPITOR) 80 MG tablet Take 1 tablet (80 mg) by mouth daily 90 tablet 3     hydrochlorothiazide (HYDRODIURIL) 12.5 MG tablet Take 1 tablet (12.5 mg) by mouth daily 30 tablet 11     olmesartan (BENICAR) 40 MG tablet Take 1 tablet (40 mg) by mouth daily 90 tablet 3     HYDROcodone-acetaminophen (NORCO) 5-325 MG tablet Take 1 tablet by mouth every 6 hours as needed for severe pain (Patient not taking: Reported on 8/14/2019) 20 tablet 0       ALLERGIES   No Known Allergies    PAST MEDICAL HISTORY:  Past Medical History:   Diagnosis Date     ASCVD (arteriosclerotic cardiovascular disease)      Chest pain      HTN (hypertension) 2/27/2012       PAST SURGICAL HISTORY:  History reviewed.  No pertinent surgical history.    FAMILY HISTORY:  Family History   Problem Relation Age of Onset     Coronary Artery Disease Father      Cerebrovascular Disease Brother 53        small stroke       SOCIAL HISTORY:  Social History     Socioeconomic History     Marital status:      Spouse name: Flory in 1996     Number of children: None     Years of education: None     Highest education level: None   Occupational History     Occupation: [email or web address]     Employer: [employer]   Social Needs     Financial resource strain: None     Food insecurity:     Worry: None     Inability: None     Transportation needs:     Medical: None     Non-medical: None   Tobacco Use     Smoking status: Current Every Day Smoker     Packs/day: 0.10     Types: Cigarettes     Smokeless tobacco: Never Used     Tobacco comment: 3 or 4 cig a day    Substance and Sexual Activity     Alcohol use: Yes     Alcohol/week: 20.0 standard drinks     Types: 20 Standard drinks or equivalent per week     Comment: 15-20 per week     Drug use: No     Sexual activity: Yes     Partners: Female   Lifestyle     Physical activity:     Days per week: None     Minutes per session: None     Stress: None   Relationships     Social connections:     Talks on phone: None     Gets together: None     Attends Zoroastrian service: None     Active member of club or organization: None     Attends meetings of clubs or organizations: None     Relationship status: None     Intimate partner violence:     Fear of current or ex partner: None     Emotionally abused: None     Physically abused: None     Forced sexual activity: None   Other Topics Concern     Parent/sibling w/ CABG, MI or angioplasty before 65F 55M? Not Asked   Social History Narrative     None       Review of Systems:  Skin:  Negative       Eyes:  Positive for glasses;visual blurring due to med?  ENT:  Negative      Respiratory:  Negative       Cardiovascular:  Negative   no lightheaded or syncopal episodes since med  "changes  Gastroenterology: Negative      Genitourinary:  Negative      Musculoskeletal:  Negative      Neurologic:  Negative      Psychiatric:  Negative      Heme/Lymph/Imm:  Negative      Endocrine:  Negative        Physical Exam:  Vitals: BP (!) 150/84   Pulse 60   Ht 1.803 m (5' 11\")   Wt 92.4 kg (203 lb 9.6 oz)   BMI 28.40 kg/m       Constitutional:  cooperative, alert and oriented, well developed, well nourished, in no acute distress        Skin:  warm and dry to the touch, no apparent skin lesions or masses noted          Head:  normocephalic, no masses or lesions        Eyes:  pupils equal and round, conjunctivae and lids unremarkable, sclera white, no xanthalasma, EOMS intact, no nystagmus        Lymph:No Cervical lymphadenopathy present     ENT:  no pallor or cyanosis        Neck:  carotid pulses are full and equal bilaterally, JVP normal, no carotid bruit        Respiratory:  normal breath sounds, clear to auscultation, normal A-P diameter, normal symmetry, normal respiratory excursion, no use of accessory muscles         Cardiac: regular rhythm, normal S1/S2, no S3 or S4, apical impulse not displaced, no murmurs, gallops or rubs occasional premature beats              pulses full and equal                                        GI:  not assessed this visit        Extremities and Muscular Skeletal:  no deformities, clubbing, cyanosis, erythema observed;no edema              Neurological:  no gross motor deficits;affect appropriate        Psych:  Alert and Oriented x 3        CC  Jorge L Mullen MD  [address] [place] [address]                Thank you for allowing me to participate in the care of your patient.      Sincerely,     Reji Medellin MD, MD     [facility]    cc:   Jorge L Mullen MD  [address] [place] [address]        "

## 2020-01-31 NOTE — LETTER
1/31/2020      Jorge L Mullen MD  [address]  [facility] [address]      RE: Christophe Del Rosario       Dear Colleague,    I had the pleasure of seeing Christophe Del Rosario in the [facility].    Service Date: 01/31/2020      HISTORY OF PRESENT ILLNESS:  It is my pleasure to see your patient, Christophe Del Rosario.  This is a patient with a history of coronary artery disease based upon a calcium scanning.  This patient also has a history of essential hypertension.  He also had pulmonary nodules on CT scan.  The nodules have been followed by Dr. Jorge L Mullen, and by all accounts and repeat CT, there has been no progression of his nodules.        The patient has noted from visits here and visits to his primary care physician, that his blood pressure is raised.  In looking back at his blood pressures, indeed, they are on the higher side  today, his blood pressure is 150/84.      If you remember, this patient was having syncopal episodes.  Since the elimination of metoprolol, he has had no further episodes of syncope or near-syncope.        His renal function today shows his creatinine is 1.28, BUN is 29.  GFR is mildly reduced at 57.  The sodium and potassium were normal at 140 and 4.2.        His last lipid profile was in August and his LDL was excellent at 58, HDL 66 and triglycerides 73, total cholesterol was 139.      IMPRESSION:   1.  Syncope.  There has been no further syncope since the elimination of metoprolol.   2.  Pulmonary nodules.  These have been followed by Dr. Jorge L Mullen and by all accounts are stable.   3.  Coronary artery disease.  The patient is asymptomatic with respect to coronary artery disease with no symptoms suggestive of angina pectoris.   4.  Excellent lipid profile based on lipids in August.   5.  Essential hypertension.  His blood pressure does not appear to be well controlled.      PLAN:  We will add in hydrochlorothiazide 12.5 mg per day.  We will have  the patient return in 2 weeks' time with a nurse visit and we will check a BMP and a blood pressure to see if his blood pressure is coming under control.        I will have the patient follow up with Mikaela Freire in 6 months' time with lipids and basic metabolic profile and he will follow up with me in a year's time, also with a basic metabolic profile and lipids.      As always, the patient is told to contact me with any questions or any concerns.      cc:      Jorge L Mullen MD    [facility]   [address] [place] [address]         REJI CALL MD, [facility]             D: 2020   T: 2020   MT:       Name:     CHRYSTAL JACKSON   MRN:      [id number]        Account:      [id number]   :      1955           Service Date: 2020      Document: [id number]         Outpatient Encounter Medications as of 2020   Medication Sig Dispense Refill     aspirin 81 MG chewable tablet Take 1 tablet (81 mg) by mouth daily 108 tablet 3     atorvastatin (LIPITOR) 80 MG tablet Take 1 tablet (80 mg) by mouth daily 90 tablet 3     hydrochlorothiazide (HYDRODIURIL) 12.5 MG tablet Take 1 tablet (12.5 mg) by mouth daily 30 tablet 11     olmesartan (BENICAR) 40 MG tablet Take 1 tablet (40 mg) by mouth daily 90 tablet 3     HYDROcodone-acetaminophen (NORCO) 5-325 MG tablet Take 1 tablet by mouth every 6 hours as needed for severe pain (Patient not taking: Reported on 2019) 20 tablet 0     No facility-administered encounter medications on file as of 2020.        Again, thank you for allowing me to participate in the care of your patient.      Sincerely,    Reji Medellin MD, MD     [facility]

## 2020-02-06 NOTE — PROGRESS NOTES
Called pt as requested , states after starting the HCTZ he developed a productive cough, bringing up some phlegm, also having body aches & states his lower lip twitches occasionally. Pt advised he has probably come down with a bug & advised to see follow up w/ Dr. Mullen. Pt advised to call back if symptoms continue or worsen. Pt verbalized understanding. Nanci BARTON

## 2020-02-06 NOTE — TELEPHONE ENCOUNTER
----- Message from Kimberly Bañuelos sent at 2/5/2020  3:37 PM CST -----  Regarding: Please call pt when you have time  Hi,    Patient called stating that since Larry Sahni has change his medication he has been having symptoms - Mainly coughing if someone can call him back that would be great.     Thank you,  Hunter

## 2020-02-12 ENCOUNTER — TELEPHONE (OUTPATIENT)
Dept: CARDIOLOGY | Facility: CLINIC | Age: 65
End: 2020-02-12

## 2020-02-12 ENCOUNTER — ALLIED HEALTH/NURSE VISIT (OUTPATIENT)
Dept: CARDIOLOGY | Facility: CLINIC | Age: 65
End: 2020-02-12
Attending: INTERNAL MEDICINE
Payer: COMMERCIAL

## 2020-02-12 DIAGNOSIS — I10 BENIGN ESSENTIAL HYPERTENSION: ICD-10-CM

## 2020-02-12 PROCEDURE — 99207 ZZC NO CHARGE LOS: CPT | Performed by: INTERNAL MEDICINE

## 2020-02-12 NOTE — TELEPHONE ENCOUNTER
----- Message from Kimberly Bañuelos sent at 2/11/2020 10:38 AM CST -----  Regarding: Please call pt today when you have time  Hi,    Patient called stating that he wants to stop taking the medication Larry Sahni prescribed for him when he came in on 1/31. Would like a call back today to talk about his BP being low.    Thank you,  Hunter

## 2020-02-12 NOTE — TELEPHONE ENCOUNTER
----- Message from Brooke Garcia LPN sent at 2/12/2020  3:17 PM CST -----  Regarding: BP check  Patient in clinic for BP check.  States stopped hydrochlorothiazide last Friday 2/7.  Took Olmesartan today at noon.    /78  P72  Own Cuff /79 P71

## 2020-02-12 NOTE — PROGRESS NOTES
Last office visit:  1/31/20    Previous blood pressure: 150/84     Mm Hg     Previous heart rate: 60     bpm    Time of reading:3:15 PM    Morning medications were taken at: Olmesartan 40 mg at 12 PM today    Today's blood pressure:    134/78   mm Hg  Patient's cuff: 134/78  P71    Today's heart rate:  72     bpm     Ordering Provider: Dr. Larry Sahni    Results sent to team # : Team 5     Additional comments:    Patient stopped hydrochlorothiazide 2/7/2020 d/t Dizziness. States hasn't had dizziness since stopping.

## 2020-02-12 NOTE — TELEPHONE ENCOUNTER
Pt called back, states he has stopped taking the HCTZ as he has several episodes of feeling lightheaded & dizzy at night, he checked his BP which was 93/72. He has not had episodes of feeling lightheaded or dizzy since stopping the HCTZ. He states his SBP has been running 130's since then. Pt has BP check today; advised to keep his apt & to bring his home cuff with him to see if it correlates. Pt verbalized understanding. Nanci BARTON

## 2020-05-30 ENCOUNTER — HOSPITAL ENCOUNTER (OUTPATIENT)
Facility: CLINIC | Age: 65
Setting detail: OBSERVATION
Discharge: HOME | End: 2020-05-31
Attending: EMERGENCY MEDICINE | Admitting: INTERNAL MEDICINE
Payer: COMMERCIAL

## 2020-05-30 ENCOUNTER — APPOINTMENT (OUTPATIENT)
Dept: CT IMAGING | Facility: CLINIC | Age: 65
End: 2020-05-30
Attending: EMERGENCY MEDICINE
Payer: COMMERCIAL

## 2020-05-30 ENCOUNTER — APPOINTMENT (OUTPATIENT)
Dept: GENERAL RADIOLOGY | Facility: CLINIC | Age: 65
End: 2020-05-30
Attending: EMERGENCY MEDICINE
Payer: COMMERCIAL

## 2020-05-30 ENCOUNTER — APPOINTMENT (OUTPATIENT)
Dept: MRI IMAGING | Facility: CLINIC | Age: 65
End: 2020-05-30
Attending: PHYSICIAN ASSISTANT
Payer: COMMERCIAL

## 2020-05-30 DIAGNOSIS — S12.090A OTHER CLOSED DISPLACED FRACTURE OF FIRST CERVICAL VERTEBRA, INITIAL ENCOUNTER (H): Primary | ICD-10-CM

## 2020-05-30 DIAGNOSIS — S01.81XA FACIAL LACERATION, INITIAL ENCOUNTER: ICD-10-CM

## 2020-05-30 DIAGNOSIS — S09.93XA DENTAL INJURY, INITIAL ENCOUNTER: ICD-10-CM

## 2020-05-30 DIAGNOSIS — T07.XXXA ABRASIONS OF MULTIPLE SITES: ICD-10-CM

## 2020-05-30 DIAGNOSIS — D32.9 MENINGIOMA (H): ICD-10-CM

## 2020-05-30 DIAGNOSIS — S12.001A CLOSED NONDISPLACED FRACTURE OF FIRST CERVICAL VERTEBRA, UNSPECIFIED FRACTURE MORPHOLOGY, INITIAL ENCOUNTER (H): ICD-10-CM

## 2020-05-30 PROBLEM — S12.000A C1 CERVICAL FRACTURE (H): Status: ACTIVE | Noted: 2020-05-30

## 2020-05-30 LAB
ANION GAP SERPL CALCULATED.3IONS-SCNC: 7 MMOL/L (ref 3–14)
BASOPHILS # BLD AUTO: 0 10E9/L (ref 0–0.2)
BASOPHILS NFR BLD AUTO: 0.1 %
BUN SERPL-MCNC: 31 MG/DL (ref 7–30)
CALCIUM SERPL-MCNC: 8.7 MG/DL (ref 8.5–10.1)
CHLORIDE SERPL-SCNC: 109 MMOL/L (ref 94–109)
CO2 SERPL-SCNC: 23 MMOL/L (ref 20–32)
CREAT SERPL-MCNC: 1.02 MG/DL (ref 0.66–1.25)
DIFFERENTIAL METHOD BLD: ABNORMAL
EOSINOPHIL # BLD AUTO: 0.1 10E9/L (ref 0–0.7)
EOSINOPHIL NFR BLD AUTO: 0.4 %
ERYTHROCYTE [DISTWIDTH] IN BLOOD BY AUTOMATED COUNT: 12.8 % (ref 10–15)
GFR SERPL CREATININE-BSD FRML MDRD: 77 ML/MIN/{1.73_M2}
GLUCOSE SERPL-MCNC: 99 MG/DL (ref 70–99)
HCT VFR BLD AUTO: 40.1 % (ref 40–53)
HGB BLD-MCNC: 14.1 G/DL (ref 13.3–17.7)
IMM GRANULOCYTES # BLD: 0 10E9/L (ref 0–0.4)
IMM GRANULOCYTES NFR BLD: 0.2 %
INR PPP: 1.05 (ref 0.86–1.14)
INTERPRETATION ECG - MUSE: NORMAL
LYMPHOCYTES # BLD AUTO: 1.3 10E9/L (ref 0.8–5.3)
LYMPHOCYTES NFR BLD AUTO: 9.3 %
MCH RBC QN AUTO: 35.3 PG (ref 26.5–33)
MCHC RBC AUTO-ENTMCNC: 35.2 G/DL (ref 31.5–36.5)
MCV RBC AUTO: 100 FL (ref 78–100)
MONOCYTES # BLD AUTO: 1.1 10E9/L (ref 0–1.3)
MONOCYTES NFR BLD AUTO: 8.3 %
NEUTROPHILS # BLD AUTO: 10.9 10E9/L (ref 1.6–8.3)
NEUTROPHILS NFR BLD AUTO: 81.7 %
NRBC # BLD AUTO: 0 10*3/UL
NRBC BLD AUTO-RTO: 0 /100
PLATELET # BLD AUTO: 254 10E9/L (ref 150–450)
POTASSIUM SERPL-SCNC: 3.9 MMOL/L (ref 3.4–5.3)
RADIOLOGIST FLAGS: ABNORMAL
RBC # BLD AUTO: 4 10E12/L (ref 4.4–5.9)
SODIUM SERPL-SCNC: 139 MMOL/L (ref 133–144)
WBC # BLD AUTO: 13.4 10E9/L (ref 4–11)

## 2020-05-30 PROCEDURE — 25000132 ZZH RX MED GY IP 250 OP 250 PS 637: Performed by: EMERGENCY MEDICINE

## 2020-05-30 PROCEDURE — 93005 ELECTROCARDIOGRAM TRACING: CPT

## 2020-05-30 PROCEDURE — 72125 CT NECK SPINE W/O DYE: CPT

## 2020-05-30 PROCEDURE — 71045 X-RAY EXAM CHEST 1 VIEW: CPT

## 2020-05-30 PROCEDURE — 96375 TX/PRO/DX INJ NEW DRUG ADDON: CPT

## 2020-05-30 PROCEDURE — 70450 CT HEAD/BRAIN W/O DYE: CPT

## 2020-05-30 PROCEDURE — 99285 EMERGENCY DEPT VISIT HI MDM: CPT | Mod: 25

## 2020-05-30 PROCEDURE — 25000125 ZZHC RX 250: Performed by: EMERGENCY MEDICINE

## 2020-05-30 PROCEDURE — 99220 ZZC INITIAL OBSERVATION CARE,LEVL III: CPT | Performed by: INTERNAL MEDICINE

## 2020-05-30 PROCEDURE — 96374 THER/PROPH/DIAG INJ IV PUSH: CPT | Mod: 59

## 2020-05-30 PROCEDURE — 25000128 H RX IP 250 OP 636: Performed by: EMERGENCY MEDICINE

## 2020-05-30 PROCEDURE — 12053 INTMD RPR FACE/MM 5.1-7.5 CM: CPT

## 2020-05-30 PROCEDURE — 70498 CT ANGIOGRAPHY NECK: CPT

## 2020-05-30 PROCEDURE — 85025 COMPLETE CBC W/AUTO DIFF WBC: CPT | Performed by: EMERGENCY MEDICINE

## 2020-05-30 PROCEDURE — 85610 PROTHROMBIN TIME: CPT | Performed by: EMERGENCY MEDICINE

## 2020-05-30 PROCEDURE — G0378 HOSPITAL OBSERVATION PER HR: HCPCS

## 2020-05-30 PROCEDURE — 99204 OFFICE O/P NEW MOD 45 MIN: CPT | Performed by: PHYSICIAN ASSISTANT

## 2020-05-30 PROCEDURE — 25000132 ZZH RX MED GY IP 250 OP 250 PS 637: Performed by: INTERNAL MEDICINE

## 2020-05-30 PROCEDURE — 25500064 ZZH RX 255 OP 636: Performed by: INTERNAL MEDICINE

## 2020-05-30 PROCEDURE — 80048 BASIC METABOLIC PNL TOTAL CA: CPT | Performed by: EMERGENCY MEDICINE

## 2020-05-30 PROCEDURE — 70553 MRI BRAIN STEM W/O & W/DYE: CPT

## 2020-05-30 PROCEDURE — 70486 CT MAXILLOFACIAL W/O DYE: CPT

## 2020-05-30 PROCEDURE — A9585 GADOBUTROL INJECTION: HCPCS | Performed by: INTERNAL MEDICINE

## 2020-05-30 RX ORDER — KETOROLAC TROMETHAMINE 15 MG/ML
15 INJECTION, SOLUTION INTRAMUSCULAR; INTRAVENOUS ONCE
Status: COMPLETED | OUTPATIENT
Start: 2020-05-30 | End: 2020-05-30

## 2020-05-30 RX ORDER — HYDROMORPHONE HYDROCHLORIDE 1 MG/ML
0.5 INJECTION, SOLUTION INTRAMUSCULAR; INTRAVENOUS; SUBCUTANEOUS
Status: DISCONTINUED | OUTPATIENT
Start: 2020-05-30 | End: 2020-05-30

## 2020-05-30 RX ORDER — ONDANSETRON 2 MG/ML
4 INJECTION INTRAMUSCULAR; INTRAVENOUS EVERY 6 HOURS PRN
Status: DISCONTINUED | OUTPATIENT
Start: 2020-05-30 | End: 2020-05-31 | Stop reason: HOSPADM

## 2020-05-30 RX ORDER — LOSARTAN POTASSIUM 100 MG/1
100 TABLET ORAL DAILY
Status: DISCONTINUED | OUTPATIENT
Start: 2020-05-30 | End: 2020-05-31 | Stop reason: HOSPADM

## 2020-05-30 RX ORDER — AMOXICILLIN 250 MG
2 CAPSULE ORAL 2 TIMES DAILY PRN
Status: DISCONTINUED | OUTPATIENT
Start: 2020-05-30 | End: 2020-05-31 | Stop reason: HOSPADM

## 2020-05-30 RX ORDER — ACETAMINOPHEN 500 MG
1000 TABLET ORAL 3 TIMES DAILY
Status: DISCONTINUED | OUTPATIENT
Start: 2020-05-30 | End: 2020-05-31 | Stop reason: HOSPADM

## 2020-05-30 RX ORDER — BISACODYL 10 MG
10 SUPPOSITORY, RECTAL RECTAL DAILY PRN
Status: DISCONTINUED | OUTPATIENT
Start: 2020-05-30 | End: 2020-05-31 | Stop reason: HOSPADM

## 2020-05-30 RX ORDER — IOPAMIDOL 755 MG/ML
70 INJECTION, SOLUTION INTRAVASCULAR ONCE
Status: COMPLETED | OUTPATIENT
Start: 2020-05-30 | End: 2020-05-30

## 2020-05-30 RX ORDER — ONDANSETRON 4 MG/1
4 TABLET, ORALLY DISINTEGRATING ORAL EVERY 6 HOURS PRN
Status: DISCONTINUED | OUTPATIENT
Start: 2020-05-30 | End: 2020-05-31 | Stop reason: HOSPADM

## 2020-05-30 RX ORDER — AMOXICILLIN 250 MG
1 CAPSULE ORAL 2 TIMES DAILY PRN
Status: DISCONTINUED | OUTPATIENT
Start: 2020-05-30 | End: 2020-05-31 | Stop reason: HOSPADM

## 2020-05-30 RX ORDER — GADOBUTROL 604.72 MG/ML
9 INJECTION INTRAVENOUS ONCE
Status: COMPLETED | OUTPATIENT
Start: 2020-05-30 | End: 2020-05-30

## 2020-05-30 RX ORDER — ACETAMINOPHEN 325 MG/1
650 TABLET ORAL EVERY 4 HOURS PRN
Status: DISCONTINUED | OUTPATIENT
Start: 2020-05-30 | End: 2020-05-31 | Stop reason: HOSPADM

## 2020-05-30 RX ORDER — HYDROMORPHONE HYDROCHLORIDE 1 MG/ML
.3-.5 INJECTION, SOLUTION INTRAMUSCULAR; INTRAVENOUS; SUBCUTANEOUS
Status: DISCONTINUED | OUTPATIENT
Start: 2020-05-30 | End: 2020-05-31 | Stop reason: HOSPADM

## 2020-05-30 RX ORDER — CYCLOBENZAPRINE HCL 5 MG
5-10 TABLET ORAL EVERY 8 HOURS PRN
Status: DISCONTINUED | OUTPATIENT
Start: 2020-05-30 | End: 2020-05-31 | Stop reason: HOSPADM

## 2020-05-30 RX ORDER — OXYCODONE HYDROCHLORIDE 5 MG/1
5 TABLET ORAL EVERY 4 HOURS PRN
Status: DISCONTINUED | OUTPATIENT
Start: 2020-05-30 | End: 2020-05-31 | Stop reason: HOSPADM

## 2020-05-30 RX ORDER — ACETAMINOPHEN 500 MG
1000 TABLET ORAL ONCE
Status: COMPLETED | OUTPATIENT
Start: 2020-05-30 | End: 2020-05-30

## 2020-05-30 RX ORDER — NALOXONE HYDROCHLORIDE 0.4 MG/ML
.1-.4 INJECTION, SOLUTION INTRAMUSCULAR; INTRAVENOUS; SUBCUTANEOUS
Status: DISCONTINUED | OUTPATIENT
Start: 2020-05-30 | End: 2020-05-31 | Stop reason: HOSPADM

## 2020-05-30 RX ORDER — POLYETHYLENE GLYCOL 3350 17 G/17G
17 POWDER, FOR SOLUTION ORAL DAILY PRN
Status: DISCONTINUED | OUTPATIENT
Start: 2020-05-30 | End: 2020-05-31 | Stop reason: HOSPADM

## 2020-05-30 RX ADMIN — SODIUM CHLORIDE 90 ML: 9 INJECTION, SOLUTION INTRAVENOUS at 13:07

## 2020-05-30 RX ADMIN — ACETAMINOPHEN 1000 MG: 500 TABLET, FILM COATED ORAL at 15:08

## 2020-05-30 RX ADMIN — KETOROLAC TROMETHAMINE 15 MG: 15 INJECTION, SOLUTION INTRAMUSCULAR; INTRAVENOUS at 15:08

## 2020-05-30 RX ADMIN — OXYCODONE HYDROCHLORIDE 5 MG: 5 TABLET ORAL at 23:17

## 2020-05-30 RX ADMIN — HYDROMORPHONE HYDROCHLORIDE 0.5 MG: 1 INJECTION, SOLUTION INTRAMUSCULAR; INTRAVENOUS; SUBCUTANEOUS at 12:24

## 2020-05-30 RX ADMIN — OXYCODONE HYDROCHLORIDE 5 MG: 5 TABLET ORAL at 18:40

## 2020-05-30 RX ADMIN — ACETAMINOPHEN 1000 MG: 500 TABLET, FILM COATED ORAL at 21:26

## 2020-05-30 RX ADMIN — Medication 1 MG: at 23:17

## 2020-05-30 RX ADMIN — LOSARTAN POTASSIUM 100 MG: 100 TABLET, FILM COATED ORAL at 18:29

## 2020-05-30 RX ADMIN — IOPAMIDOL 70 ML: 755 INJECTION, SOLUTION INTRAVENOUS at 13:07

## 2020-05-30 RX ADMIN — GADOBUTROL 9 ML: 604.72 INJECTION INTRAVENOUS at 20:29

## 2020-05-30 ASSESSMENT — MIFFLIN-ST. JEOR: SCORE: 1710.25
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
## 2020-05-30 NOTE — PLAN OF CARE
Admit around 1700. A/O x 4. Ind in the room. Pain covered with oral meds. Tolerating soft diet. Obs, plan to stay the night. Admission complete. Will continue to monitor.

## 2020-05-30 NOTE — PHARMACY-ADMISSION MEDICATION HISTORY
Pharmacy Medication History  Admission medication history interview status for the 5/30/2020  admission is complete. See EPIC admission navigator for prior to admission medications     Medication history sources: Patient  Medication history source reliability: Good  Adherence assessment: Good    Significant changes made to the medication list:  Removed Norco per patient report        Additional medication history information:   None     Medication reconciliation completed by provider prior to medication history? No    Time spent in this activity: 10 minutes       Prior to Admission medications    Medication Sig Last Dose Taking? Auth Provider   aspirin 81 MG chewable tablet Take 1 tablet (81 mg) by mouth daily 5/29/2020 at Unknown time Yes Jorge L Mullen MD   atorvastatin (LIPITOR) 80 MG tablet Take 1 tablet (80 mg) by mouth daily 5/29/2020 at Unknown time Yes Reji Medellin MD   olmesartan (BENICAR) 40 MG tablet Take 1 tablet (40 mg) by mouth daily 5/29/2020 at Unknown time Yes Maura Freire, APRN SOILA Callahan Said   Student Pharmacist
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
## 2020-05-31 ENCOUNTER — APPOINTMENT (OUTPATIENT)
Dept: PHYSICAL THERAPY | Facility: CLINIC | Age: 65
End: 2020-05-31
Attending: INTERNAL MEDICINE
Payer: COMMERCIAL

## 2020-05-31 VITALS
SYSTOLIC BLOOD PRESSURE: 148 MMHG | DIASTOLIC BLOOD PRESSURE: 86 MMHG | WEIGHT: 194 LBS | RESPIRATION RATE: 18 BRPM | BODY MASS INDEX: 27.16 KG/M2 | OXYGEN SATURATION: 98 % | TEMPERATURE: 97.5 F | HEIGHT: 71 IN

## 2020-05-31 LAB
ANION GAP SERPL CALCULATED.3IONS-SCNC: 6 MMOL/L (ref 3–14)
BUN SERPL-MCNC: 26 MG/DL (ref 7–30)
CALCIUM SERPL-MCNC: 8.5 MG/DL (ref 8.5–10.1)
CHLORIDE SERPL-SCNC: 105 MMOL/L (ref 94–109)
CO2 SERPL-SCNC: 25 MMOL/L (ref 20–32)
CREAT SERPL-MCNC: 0.98 MG/DL (ref 0.66–1.25)
ERYTHROCYTE [DISTWIDTH] IN BLOOD BY AUTOMATED COUNT: 12.9 % (ref 10–15)
GFR SERPL CREATININE-BSD FRML MDRD: 81 ML/MIN/{1.73_M2}
GLUCOSE SERPL-MCNC: 151 MG/DL (ref 70–99)
HCT VFR BLD AUTO: 39.9 % (ref 40–53)
HGB BLD-MCNC: 13.8 G/DL (ref 13.3–17.7)
MCH RBC QN AUTO: 34.8 PG (ref 26.5–33)
MCHC RBC AUTO-ENTMCNC: 34.6 G/DL (ref 31.5–36.5)
MCV RBC AUTO: 101 FL (ref 78–100)
PLATELET # BLD AUTO: 238 10E9/L (ref 150–450)
POTASSIUM SERPL-SCNC: 3.6 MMOL/L (ref 3.4–5.3)
RBC # BLD AUTO: 3.96 10E12/L (ref 4.4–5.9)
SODIUM SERPL-SCNC: 136 MMOL/L (ref 133–144)
WBC # BLD AUTO: 9.6 10E9/L (ref 4–11)

## 2020-05-31 PROCEDURE — G0378 HOSPITAL OBSERVATION PER HR: HCPCS

## 2020-05-31 PROCEDURE — L0172 CERV COL SR FOAM 2PC PRE OTS: HCPCS

## 2020-05-31 PROCEDURE — 36415 COLL VENOUS BLD VENIPUNCTURE: CPT | Performed by: INTERNAL MEDICINE

## 2020-05-31 PROCEDURE — 85027 COMPLETE CBC AUTOMATED: CPT | Performed by: INTERNAL MEDICINE

## 2020-05-31 PROCEDURE — 25000132 ZZH RX MED GY IP 250 OP 250 PS 637: Performed by: INTERNAL MEDICINE

## 2020-05-31 PROCEDURE — 25000132 ZZH RX MED GY IP 250 OP 250 PS 637: Performed by: PHYSICIAN ASSISTANT

## 2020-05-31 PROCEDURE — 80048 BASIC METABOLIC PNL TOTAL CA: CPT | Performed by: INTERNAL MEDICINE

## 2020-05-31 PROCEDURE — 99217 ZZC OBSERVATION CARE DISCHARGE: CPT | Performed by: PHYSICIAN ASSISTANT

## 2020-05-31 PROCEDURE — 97161 PT EVAL LOW COMPLEX 20 MIN: CPT | Mod: GP

## 2020-05-31 RX ORDER — ACETAMINOPHEN 500 MG
1000 TABLET ORAL 3 TIMES DAILY
Qty: 1 BOTTLE | Refills: 0 | COMMUNITY
Start: 2020-05-31 | End: 2020-05-31

## 2020-05-31 RX ORDER — AMOXICILLIN 250 MG
1 CAPSULE ORAL 2 TIMES DAILY
Qty: 10 TABLET | Refills: 0 | Status: SHIPPED | OUTPATIENT
Start: 2020-05-31 | End: 2020-05-31

## 2020-05-31 RX ORDER — CYCLOBENZAPRINE HCL 5 MG
5-10 TABLET ORAL 3 TIMES DAILY PRN
Qty: 30 TABLET | Refills: 0 | Status: SHIPPED | OUTPATIENT
Start: 2020-05-31 | End: 2020-05-31

## 2020-05-31 RX ORDER — ACETAMINOPHEN 500 MG
1000 TABLET ORAL 3 TIMES DAILY
Qty: 1 BOTTLE | Refills: 0 | Status: SHIPPED | OUTPATIENT
Start: 2020-05-31 | End: 2021-04-27

## 2020-05-31 RX ORDER — OXYCODONE HYDROCHLORIDE 5 MG/1
5 TABLET ORAL EVERY 6 HOURS PRN
Qty: 20 TABLET | Refills: 0 | Status: SHIPPED | OUTPATIENT
Start: 2020-05-31 | End: 2020-05-31

## 2020-05-31 RX ORDER — OXYCODONE HYDROCHLORIDE 5 MG/1
5 TABLET ORAL EVERY 6 HOURS PRN
Qty: 20 TABLET | Refills: 0 | Status: SHIPPED | OUTPATIENT
Start: 2020-05-31 | End: 2021-04-27

## 2020-05-31 RX ORDER — CYCLOBENZAPRINE HCL 5 MG
5-10 TABLET ORAL 3 TIMES DAILY PRN
Qty: 30 TABLET | Refills: 0 | Status: SHIPPED | OUTPATIENT
Start: 2020-05-31 | End: 2021-04-27

## 2020-05-31 RX ORDER — AMOXICILLIN 250 MG
1 CAPSULE ORAL 2 TIMES DAILY
Qty: 10 TABLET | Refills: 0 | Status: SHIPPED | OUTPATIENT
Start: 2020-05-31 | End: 2021-04-27

## 2020-05-31 RX ADMIN — ACETAMINOPHEN 1000 MG: 500 TABLET, FILM COATED ORAL at 15:38

## 2020-05-31 RX ADMIN — ACETAMINOPHEN 1000 MG: 500 TABLET, FILM COATED ORAL at 09:53

## 2020-05-31 RX ADMIN — CYCLOBENZAPRINE HYDROCHLORIDE 5 MG: 5 TABLET, FILM COATED ORAL at 03:08

## 2020-05-31 RX ADMIN — CYCLOBENZAPRINE HYDROCHLORIDE 10 MG: 5 TABLET, FILM COATED ORAL at 16:35

## 2020-05-31 RX ADMIN — SENNOSIDES AND DOCUSATE SODIUM 1 TABLET: 8.6; 5 TABLET ORAL at 10:00

## 2020-05-31 RX ADMIN — OXYCODONE HYDROCHLORIDE 5 MG: 5 TABLET ORAL at 07:52

## 2020-05-31 RX ADMIN — LOSARTAN POTASSIUM 100 MG: 100 TABLET, FILM COATED ORAL at 09:55

## 2020-05-31 ASSESSMENT — MIFFLIN-ST. JEOR: SCORE: 1692.11

## 2020-05-31 NOTE — PROVIDER NOTIFICATION
Writer AZUL Lira, notified, Hospitalist request you look at MRI results from today, and speak to findings on Mild associated mass effect on paramedian  frontal lobes with vasogenic edema. Please advise.     Writer spoke to Archana, she informed she reviewed MRI and not concerned with MRI findings and will address any issue/s with follow-up in 6 weeks
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
## 2020-06-01 ENCOUNTER — OFFICE VISIT (OUTPATIENT)
Dept: FAMILY MEDICINE | Facility: CLINIC | Age: 65
End: 2020-06-01

## 2020-06-01 VITALS
WEIGHT: 198 LBS | DIASTOLIC BLOOD PRESSURE: 88 MMHG | TEMPERATURE: 98.5 F | SYSTOLIC BLOOD PRESSURE: 136 MMHG | OXYGEN SATURATION: 96 % | HEART RATE: 69 BPM | RESPIRATION RATE: 16 BRPM | BODY MASS INDEX: 27.62 KG/M2

## 2020-06-01 DIAGNOSIS — S12.000A C1 CERVICAL FRACTURE (H): Primary | ICD-10-CM

## 2020-06-01 DIAGNOSIS — S01.512D LACERATION OF ORAL CAVITY, SUBSEQUENT ENCOUNTER: Primary | ICD-10-CM

## 2020-06-01 DIAGNOSIS — S12.000D CLOSED DISPLACED FRACTURE OF FIRST CERVICAL VERTEBRA WITH ROUTINE HEALING, UNSPECIFIED FRACTURE MORPHOLOGY, SUBSEQUENT ENCOUNTER: ICD-10-CM

## 2020-06-01 PROCEDURE — 99213 OFFICE O/P EST LOW 20 MIN: CPT | Performed by: FAMILY MEDICINE
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
## 2020-06-05 ENCOUNTER — OFFICE VISIT (OUTPATIENT)
Dept: FAMILY MEDICINE | Facility: CLINIC | Age: 65
End: 2020-06-05

## 2020-06-05 VITALS
DIASTOLIC BLOOD PRESSURE: 72 MMHG | RESPIRATION RATE: 16 BRPM | BODY MASS INDEX: 27.72 KG/M2 | HEART RATE: 68 BPM | WEIGHT: 198 LBS | SYSTOLIC BLOOD PRESSURE: 110 MMHG | OXYGEN SATURATION: 97 % | HEIGHT: 71 IN | TEMPERATURE: 98.4 F

## 2020-06-05 DIAGNOSIS — D32.9 MENINGIOMA (H): ICD-10-CM

## 2020-06-05 DIAGNOSIS — S01.81XD FACIAL LACERATION, SUBSEQUENT ENCOUNTER: ICD-10-CM

## 2020-06-05 DIAGNOSIS — S12.041D CLOSED NONDISPLACED LATERAL MASS FRACTURE OF FIRST CERVICAL VERTEBRA WITH ROUTINE HEALING, SUBSEQUENT ENCOUNTER: Primary | ICD-10-CM

## 2020-06-05 PROCEDURE — 99495 TRANSJ CARE MGMT MOD F2F 14D: CPT | Mod: 25 | Performed by: FAMILY MEDICINE

## 2020-06-05 ASSESSMENT — MIFFLIN-ST. JEOR: SCORE: 1710.25
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
## 2020-07-06 ENCOUNTER — OFFICE VISIT (OUTPATIENT)
Dept: NEUROSURGERY | Facility: CLINIC | Age: 65
End: 2020-07-06
Attending: NEUROLOGICAL SURGERY
Payer: COMMERCIAL

## 2020-07-06 ENCOUNTER — HOSPITAL ENCOUNTER (OUTPATIENT)
Dept: GENERAL RADIOLOGY | Facility: CLINIC | Age: 65
End: 2020-07-06
Attending: NEUROLOGICAL SURGERY
Payer: COMMERCIAL

## 2020-07-06 VITALS
DIASTOLIC BLOOD PRESSURE: 81 MMHG | OXYGEN SATURATION: 98 % | BODY MASS INDEX: 27.72 KG/M2 | HEIGHT: 71 IN | HEART RATE: 61 BPM | SYSTOLIC BLOOD PRESSURE: 144 MMHG | WEIGHT: 198 LBS | RESPIRATION RATE: 16 BRPM

## 2020-07-06 DIAGNOSIS — S12.041A CLOSED NONDISPLACED LATERAL MASS FRACTURE OF FIRST CERVICAL VERTEBRA, INITIAL ENCOUNTER (H): ICD-10-CM

## 2020-07-06 DIAGNOSIS — S12.000A C1 CERVICAL FRACTURE (H): ICD-10-CM

## 2020-07-06 DIAGNOSIS — D32.0 BENIGN NEOPLASM OF CEREBRAL MENINGES (H): Primary | ICD-10-CM

## 2020-07-06 PROCEDURE — 72040 X-RAY EXAM NECK SPINE 2-3 VW: CPT

## 2020-07-06 PROCEDURE — G0463 HOSPITAL OUTPT CLINIC VISIT: HCPCS

## 2020-07-06 PROCEDURE — 99213 OFFICE O/P EST LOW 20 MIN: CPT | Performed by: NEUROLOGICAL SURGERY

## 2020-07-06 ASSESSMENT — PAIN SCALES - GENERAL: PAINLEVEL: NO PAIN (0)

## 2020-07-06 ASSESSMENT — MIFFLIN-ST. JEOR: SCORE: 1710.25

## 2020-07-06 NOTE — PROGRESS NOTES
"It was a pleasure to see Christophe Del Rosario today in Neurosurgery Clinic. He is a 64 year old male ***    Vitals:    07/06/20 1250   BP: (!) 144/81   Pulse: 61   Resp: 16   SpO2: 98%   Weight: 89.8 kg (198 lb)   Height: 1.803 m (5' 11\")     Body mass index is 27.62 kg/m .  No Pain (0)    ***    Imaging: ***    Assessment: ***    Plan: ***      "
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
## 2020-07-06 NOTE — NURSING NOTE
"Christophe Del Rosario is a 64 year old male who presents for:  Chief Complaint   Patient presents with     Consult For     Neck        Initial Vitals:  BP (!) 144/81   Pulse 61   Resp 16   Ht 5' 11\" (1.803 m)   Wt 198 lb (89.8 kg)   SpO2 98%   BMI 27.62 kg/m   Estimated body mass index is 27.62 kg/m  as calculated from the following:    Height as of this encounter: 5' 11\" (1.803 m).    Weight as of this encounter: 198 lb (89.8 kg).. Body surface area is 2.12 meters squared. BP completed using cuff size: regular  No Pain (0)    Nursing Comments: Pt present today for neck issues.    John Mena, WALI    "

## 2020-09-18 DIAGNOSIS — I10 BENIGN ESSENTIAL HYPERTENSION: ICD-10-CM

## 2020-09-18 DIAGNOSIS — I25.10 CORONARY ARTERY DISEASE INVOLVING NATIVE CORONARY ARTERY OF NATIVE HEART WITHOUT ANGINA PECTORIS: ICD-10-CM

## 2020-09-18 RX ORDER — ATORVASTATIN CALCIUM 80 MG/1
80 TABLET, FILM COATED ORAL DAILY
Qty: 90 TABLET | Refills: 0 | Status: SHIPPED | OUTPATIENT
Start: 2020-09-18 | End: 2020-12-14

## 2020-09-18 RX ORDER — OLMESARTAN MEDOXOMIL 40 MG/1
40 TABLET ORAL DAILY
Qty: 90 TABLET | Refills: 0 | Status: SHIPPED | OUTPATIENT
Start: 2020-09-18 | End: 2020-12-14

## 2020-11-25 DIAGNOSIS — E78.00 PURE HYPERCHOLESTEROLEMIA: ICD-10-CM

## 2020-11-25 DIAGNOSIS — I10 ESSENTIAL HYPERTENSION: ICD-10-CM

## 2020-11-25 LAB
ALT SERPL W P-5'-P-CCNC: 31 U/L (ref 0–70)
ANION GAP SERPL CALCULATED.3IONS-SCNC: 5 MMOL/L (ref 3–14)
BUN SERPL-MCNC: 28 MG/DL (ref 7–30)
CALCIUM SERPL-MCNC: 8.8 MG/DL (ref 8.5–10.1)
CHLORIDE SERPL-SCNC: 105 MMOL/L (ref 94–109)
CHOLEST SERPL-MCNC: 165 MG/DL
CO2 SERPL-SCNC: 26 MMOL/L (ref 20–32)
CREAT SERPL-MCNC: 1.21 MG/DL (ref 0.66–1.25)
GFR SERPL CREATININE-BSD FRML MDRD: 62 ML/MIN/{1.73_M2}
GLUCOSE SERPL-MCNC: 96 MG/DL (ref 70–99)
HDLC SERPL-MCNC: 80 MG/DL
LDLC SERPL CALC-MCNC: 67 MG/DL
NONHDLC SERPL-MCNC: 85 MG/DL
POTASSIUM SERPL-SCNC: 4.1 MMOL/L (ref 3.4–5.3)
SODIUM SERPL-SCNC: 136 MMOL/L (ref 133–144)
TRIGL SERPL-MCNC: 92 MG/DL

## 2020-11-25 PROCEDURE — 36415 COLL VENOUS BLD VENIPUNCTURE: CPT | Performed by: INTERNAL MEDICINE

## 2020-11-25 PROCEDURE — 80061 LIPID PANEL: CPT | Performed by: INTERNAL MEDICINE

## 2020-11-25 PROCEDURE — 84460 ALANINE AMINO (ALT) (SGPT): CPT | Performed by: INTERNAL MEDICINE

## 2020-11-25 PROCEDURE — 80048 BASIC METABOLIC PNL TOTAL CA: CPT | Performed by: INTERNAL MEDICINE

## 2020-12-04 ENCOUNTER — VIRTUAL VISIT (OUTPATIENT)
Dept: CARDIOLOGY | Facility: CLINIC | Age: 65
End: 2020-12-04
Attending: INTERNAL MEDICINE
Payer: MEDICARE

## 2020-12-04 DIAGNOSIS — I25.10 ASCVD (ARTERIOSCLEROTIC CARDIOVASCULAR DISEASE): ICD-10-CM

## 2020-12-04 DIAGNOSIS — I25.10 CORONARY ARTERY DISEASE INVOLVING NATIVE CORONARY ARTERY OF NATIVE HEART WITHOUT ANGINA PECTORIS: ICD-10-CM

## 2020-12-04 DIAGNOSIS — E78.00 PURE HYPERCHOLESTEROLEMIA: ICD-10-CM

## 2020-12-04 DIAGNOSIS — I10 ESSENTIAL HYPERTENSION: ICD-10-CM

## 2020-12-04 DIAGNOSIS — I10 BENIGN ESSENTIAL HYPERTENSION: ICD-10-CM

## 2020-12-04 DIAGNOSIS — R55 SYNCOPE, UNSPECIFIED SYNCOPE TYPE: ICD-10-CM

## 2020-12-04 PROCEDURE — 99213 OFFICE O/P EST LOW 20 MIN: CPT | Mod: 95 | Performed by: INTERNAL MEDICINE
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
## 2020-12-04 NOTE — PROGRESS NOTES
"Christophe Del Rosario is a 65 year old male who is being evaluated via a billable video visit.      The patient has been notified of following:     \"This video visit will be conducted via a call between you and your physician/provider. We have found that certain health care needs can be provided without the need for an in-person physical exam.  This service lets us provide the care you need with a video conversation.  If a prescription is necessary we can send it directly to your pharmacy.  If lab work is needed we can place an order for that and you can then stop by our lab to have the test done at a later time.    Video visits are billed at different rates depending on your insurance coverage.  Please reach out to your insurance provider with any questions.    If during the course of the call the physician/provider feels a video visit is not appropriate, you will not be charged for this service.\"    Patient has given verbal consent for Video visit? Yes  How would you like to obtain your AVS? MyChart  If you are dropped from the video visit, the video invite should be resent to: Send to e-mail at: [email or web address].  Will anyone else be joining your video visit? No      Vitals - Patient Reported 12/4/2020   Height (Patient Reported) 5' 11\"   Weight (Patient Reported) 190 lb   BMI (Based on Pt Reported Ht/Wt) 26.5 kg/m2   Systolic (Patient Reported) 124   Diastolic (Patient Reported) 76   Pulse (Patient Reported) 66         Review Of Systems  Skin: NEGATIVE  Eyes:Ears/Nose/Throat: Glasses  Respiratory: NEGATIVE  Cardiovascular:NEGATIVE  Gastrointestinal: NEGATIVE  Genitourinary:NEGATIVE   Musculoskeletal: Joint pain in neck  Neurologic: NEGATIVE  Psychiatric: NEGATIVE  Hematologic/Lymphatic/Immunologic: NEGATIVE  Endocrine:  NEGATIVE    Kimi BUSH    Video-Visit Details  General:  no apparent distress, normal body habitus, sitting upright.  ENT/Mouth:  membranes moist, no nasal discharge.  Normal head shape, no " apparent injury or laceration.  Eyes:  no scleral icterus, normal conjunctivae.  No observed jaundice.  Neck:  no apparent neck swelling.   Chest/Lungs:  No breathing difficulty while speaking.  No audible wheezing.  No cough during conversation.  Cardiovascular:  No obviously elevated jugular venous pressure.  No apparent edema bilaterally in LE.   Abdomen:  no obvious abdominal distention.   Extremities:  no apparent cyanosis.  Skin:  no xanthelasma.  No facial lacerations.  Neurologic:  Normal arm motion bilateral, no tremors.    Psychiatric:  Alert and oriented x3, calm demeanor    The rest of the comprehensive physical examination is deferred due to public health emergency video visit restrictions.    Type of service:  Video Visit    Video Start Time: 13:43  Video End Time: 13:54    Originating Location (pt. Location): Home    Distant Location (provider location):  [facility]     Platform used for Video Visit: Mynor Medellin MD, FACC FRCPI

## 2020-12-14 DIAGNOSIS — I25.10 CORONARY ARTERY DISEASE INVOLVING NATIVE CORONARY ARTERY OF NATIVE HEART WITHOUT ANGINA PECTORIS: ICD-10-CM

## 2020-12-14 DIAGNOSIS — I10 BENIGN ESSENTIAL HYPERTENSION: ICD-10-CM

## 2020-12-14 RX ORDER — OLMESARTAN MEDOXOMIL 40 MG/1
40 TABLET ORAL DAILY
Qty: 90 TABLET | Refills: 3 | Status: SHIPPED | OUTPATIENT
Start: 2020-12-14 | End: 2021-10-21

## 2020-12-14 RX ORDER — ATORVASTATIN CALCIUM 80 MG/1
80 TABLET, FILM COATED ORAL DAILY
Qty: 90 TABLET | Refills: 3 | Status: SHIPPED | OUTPATIENT
Start: 2020-12-14 | End: 2021-09-28

## 2021-01-15 ENCOUNTER — HEALTH MAINTENANCE LETTER (OUTPATIENT)
Age: 66
End: 2021-01-15

## 2021-04-27 ENCOUNTER — OFFICE VISIT (OUTPATIENT)
Dept: FAMILY MEDICINE | Facility: CLINIC | Age: 66
End: 2021-04-27

## 2021-04-27 VITALS
SYSTOLIC BLOOD PRESSURE: 138 MMHG | OXYGEN SATURATION: 96 % | DIASTOLIC BLOOD PRESSURE: 82 MMHG | BODY MASS INDEX: 28.21 KG/M2 | WEIGHT: 201.5 LBS | HEIGHT: 71 IN | HEART RATE: 69 BPM | RESPIRATION RATE: 16 BRPM | TEMPERATURE: 97.6 F

## 2021-04-27 DIAGNOSIS — F10.20 UNCOMPLICATED ALCOHOL DEPENDENCE (H): ICD-10-CM

## 2021-04-27 DIAGNOSIS — R07.89 CHEST WALL PAIN: Primary | ICD-10-CM

## 2021-04-27 DIAGNOSIS — D32.0 BENIGN NEOPLASM OF CEREBRAL MENINGES (H): ICD-10-CM

## 2021-04-27 PROCEDURE — 71101 X-RAY EXAM UNILAT RIBS/CHEST: CPT | Mod: FY | Performed by: FAMILY MEDICINE

## 2021-04-27 PROCEDURE — 99214 OFFICE O/P EST MOD 30 MIN: CPT | Performed by: FAMILY MEDICINE

## 2021-04-27 ASSESSMENT — MIFFLIN-ST. JEOR: SCORE: 1721.13

## 2021-04-27 NOTE — PROGRESS NOTES
"SUBJECTIVE:  Christophe Del Rosario is a 65 year old male who presents to the office with the CC of chest pain.  Patient complains of chest pain post lifting.  The pain is characterized as mild sharp located right chest with radiation to none. Symptoms began 2 week(s) ago, gradual onset  Pain is associated with breathing.  Pain is exacerbated by movement.  Pain is relieved by rest.  Cardiac risk factors: negative for abnormal lipids, DM, HTN, tobacco and negative FH    Past Medical History:   Diagnosis Date     ASCVD (arteriosclerotic cardiovascular disease)      Chest pain      HTN (hypertension) 2/27/2012         Current Outpatient Medications:      aspirin 81 MG chewable tablet, Take 1 tablet (81 mg) by mouth daily, Disp: 108 tablet, Rfl: 3     atorvastatin (LIPITOR) 80 MG tablet, Take 1 tablet (80 mg) by mouth daily, Disp: 90 tablet, Rfl: 3     olmesartan (BENICAR) 40 MG tablet, Take 1 tablet (40 mg) by mouth daily, Disp: 90 tablet, Rfl: 3    Social History     Tobacco Use     Smoking status: Current Every Day Smoker     Packs/day: 0.10     Types: Cigarettes     Smokeless tobacco: Never Used     Tobacco comment: 3 or 4 cig a day    Substance Use Topics     Alcohol use: Yes     Alcohol/week: 20.0 standard drinks     Types: 20 Standard drinks or equivalent per week     Comment: 15-20 per week       ROS:Review of systems negative except as stated above.    EXAM:  /82   Pulse 69   Temp 97.6  F (36.4  C)   Resp 16   Ht 1.803 m (5' 11\")   Wt 91.4 kg (201 lb 8 oz)   SpO2 96%   BMI 28.10 kg/m    GENERAL APPEARANCE: healthy, alert and no distress  EYES: EOMI,  PERRL, conjunctiva clear  HENT: ear canals and TM's normal.  Nose and mouth without ulcers, erythema or lesions  NECK: supple, nontender, no lymphadenopathy  RESP: lungs clear to auscultation - no rales, rhonchi or wheezes  CV: regular rates and rhythm, normal S1 S2, no murmur noted  ABDOMEN:  soft, nontender, no HSM or masses and bowel sounds normal  MS: " extremities normal- no gross deformities noted, no erythema, FROM noted in all extremities and right chest tender to the touch  NEURO: Normal strength and tone, sensory exam grossly normal,  normal speech and mentation  SKIN: no suspicious lesions or rashes     Assessment/Plan:  Christophe was seen today for chest pain.    Diagnoses and all orders for this visit:    Chest wall pain  Discussed rib fractures/injuries with the patient.  The acute phase of the pain from rib fractures typically takes a week or so to alex, then slowly gets better over 4-6 weeks.   Told the patient to be sure to hold a pillow tightly against their chest when coughing, it will help with the pain.   Recommended deep breathing regularly throughout the day at least every hour, to help prevent atelectasis and pneumonias since people can preferentially inflate one side of the lungs vs the others.    Instructed the patient to call if they develop any fevers associated with cough and purulent sputum as these can be signs and symptoms of pneumonia.     -     X-ray rt Rib unilat 3 vws + PA chest    Uncomplicated alcohol dependence (H)  4 drinks per day,   Benign neoplasm of cerebral meninges (H)  Known issue that I take into account for his medical decisions, no current exacerbations or new concerns.        Jorge L Mullen MD MD  [facility]  [phone]

## 2021-07-01 ENCOUNTER — HOSPITAL ENCOUNTER (OUTPATIENT)
Dept: MRI IMAGING | Facility: CLINIC | Age: 66
Discharge: HOME | End: 2021-07-01
Attending: NEUROLOGICAL SURGERY | Admitting: NEUROLOGICAL SURGERY
Payer: MEDICARE

## 2021-07-01 DIAGNOSIS — D32.0 BENIGN NEOPLASM OF CEREBRAL MENINGES (H): ICD-10-CM

## 2021-07-01 PROCEDURE — 255N000002 HC RX 255 OP 636: Performed by: NEUROLOGICAL SURGERY

## 2021-07-01 PROCEDURE — 70553 MRI BRAIN STEM W/O & W/DYE: CPT | Mod: MG

## 2021-07-01 PROCEDURE — A9585 GADOBUTROL INJECTION: HCPCS | Performed by: NEUROLOGICAL SURGERY

## 2021-07-01 RX ORDER — GADOBUTROL 604.72 MG/ML
9 INJECTION INTRAVENOUS ONCE
Status: COMPLETED | OUTPATIENT
Start: 2021-07-01 | End: 2021-07-01

## 2021-07-01 RX ADMIN — GADOBUTROL 9 ML: 604.72 INJECTION INTRAVENOUS at 13:53

## 2021-07-23 ENCOUNTER — OFFICE VISIT (OUTPATIENT)
Dept: NEUROSURGERY | Facility: CLINIC | Age: 66
End: 2021-07-23
Attending: NEUROLOGICAL SURGERY
Payer: MEDICARE

## 2021-07-23 VITALS
WEIGHT: 191 LBS | DIASTOLIC BLOOD PRESSURE: 91 MMHG | OXYGEN SATURATION: 98 % | TEMPERATURE: 98.1 F | HEART RATE: 69 BPM | BODY MASS INDEX: 26.74 KG/M2 | SYSTOLIC BLOOD PRESSURE: 149 MMHG | HEIGHT: 71 IN

## 2021-07-23 DIAGNOSIS — D32.0 BENIGN NEOPLASM OF CEREBRAL MENINGES (H): Primary | ICD-10-CM

## 2021-07-23 PROCEDURE — 99213 OFFICE O/P EST LOW 20 MIN: CPT | Performed by: NEUROLOGICAL SURGERY

## 2021-07-23 PROCEDURE — G0463 HOSPITAL OUTPT CLINIC VISIT: HCPCS

## 2021-07-23 ASSESSMENT — MIFFLIN-ST. JEOR: SCORE: 1673.5

## 2021-07-23 ASSESSMENT — PAIN SCALES - GENERAL: PAINLEVEL: NO PAIN (0)

## 2021-07-23 NOTE — NURSING NOTE
"Christophe Del Rosario is a 65 year old male who presents for:  Chief Complaint   Patient presents with     Neurologic Problem     Review MRI results        Initial Vitals:  BP (!) 149/91   Pulse 69   Temp 98.1  F (36.7  C) (Oral)   Ht 5' 11\" (1.803 m)   Wt 191 lb (86.6 kg)   SpO2 98%   BMI 26.64 kg/m   Estimated body mass index is 26.64 kg/m  as calculated from the following:    Height as of this encounter: 5' 11\" (1.803 m).    Weight as of this encounter: 191 lb (86.6 kg).. Body surface area is 2.08 meters squared. BP completed using cuff size: regular  No Pain (0)          Nursing Comments:follow up to review MRI        Ct Oleary RN       "

## 2021-07-23 NOTE — PROGRESS NOTES
"It was a pleasure to see Christophe Del Rosario today in Neurosurgery Clinic. 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 He is here for routine follow up. No new issues over last year.    Vitals:    07/23/21 1348   BP: (!) 149/91   Pulse: 69   Temp: 98.1  F (36.7  C)   TempSrc: Oral   SpO2: 98%   Weight: 86.6 kg (191 lb)   Height: 1.803 m (5' 11\")     Body mass index is 26.64 kg/m .  No Pain (0)    Awake, alert.  Neurologically intact.    Imaging: MRI of the brain from July 1, 2021 shows no change in size of the meningioma when compared with the year prior.  The imaging was reviewed with patient shown to the patient clinic today.    Assessment: Olfactory groove meningioma, stable.    Plan: We discussed treatment options and the patient will return in 1 year with a repeat MRI of the brain.     "

## 2021-09-04 ENCOUNTER — HEALTH MAINTENANCE LETTER (OUTPATIENT)
Age: 66
End: 2021-09-04

## 2021-09-28 DIAGNOSIS — I25.10 CORONARY ARTERY DISEASE INVOLVING NATIVE CORONARY ARTERY OF NATIVE HEART WITHOUT ANGINA PECTORIS: ICD-10-CM

## 2021-09-28 RX ORDER — ATORVASTATIN CALCIUM 80 MG/1
80 TABLET, FILM COATED ORAL DAILY
Qty: 90 TABLET | Refills: 0 | Status: SHIPPED | OUTPATIENT
Start: 2021-09-28 | End: 2021-11-26

## 2021-10-21 DIAGNOSIS — I10 BENIGN ESSENTIAL HYPERTENSION: ICD-10-CM

## 2021-10-21 RX ORDER — OLMESARTAN MEDOXOMIL 40 MG/1
40 TABLET ORAL DAILY
Qty: 90 TABLET | Refills: 3 | Status: SHIPPED | OUTPATIENT
Start: 2021-10-21 | End: 2021-12-30

## 2021-11-26 DIAGNOSIS — I25.10 CORONARY ARTERY DISEASE INVOLVING NATIVE CORONARY ARTERY OF NATIVE HEART WITHOUT ANGINA PECTORIS: ICD-10-CM

## 2021-11-26 RX ORDER — ATORVASTATIN CALCIUM 80 MG/1
80 TABLET, FILM COATED ORAL DAILY
Qty: 90 TABLET | Refills: 0 | Status: SHIPPED | OUTPATIENT
Start: 2021-11-26 | End: 2022-06-01

## 2021-12-27 DIAGNOSIS — E78.00 PURE HYPERCHOLESTEROLEMIA: ICD-10-CM

## 2021-12-27 DIAGNOSIS — I25.10 ASCVD (ARTERIOSCLEROTIC CARDIOVASCULAR DISEASE): ICD-10-CM

## 2021-12-27 DIAGNOSIS — I10 ESSENTIAL HYPERTENSION: Primary | ICD-10-CM

## 2021-12-30 ENCOUNTER — OFFICE VISIT (OUTPATIENT)
Dept: FAMILY MEDICINE | Facility: CLINIC | Age: 66
End: 2021-12-30

## 2021-12-30 VITALS
WEIGHT: 192 LBS | OXYGEN SATURATION: 99 % | BODY MASS INDEX: 26.88 KG/M2 | HEIGHT: 71 IN | HEART RATE: 65 BPM | DIASTOLIC BLOOD PRESSURE: 77 MMHG | RESPIRATION RATE: 18 BRPM | SYSTOLIC BLOOD PRESSURE: 156 MMHG

## 2021-12-30 DIAGNOSIS — I10 ESSENTIAL HYPERTENSION: Primary | ICD-10-CM

## 2021-12-30 DIAGNOSIS — I10 BENIGN ESSENTIAL HYPERTENSION: ICD-10-CM

## 2021-12-30 DIAGNOSIS — Z23 NEEDS FLU SHOT: ICD-10-CM

## 2021-12-30 PROCEDURE — 93050 ART PRESSURE WAVEFORM ANALYS: CPT | Performed by: FAMILY MEDICINE

## 2021-12-30 PROCEDURE — 99214 OFFICE O/P EST MOD 30 MIN: CPT | Performed by: FAMILY MEDICINE

## 2021-12-30 RX ORDER — OLMESARTAN MEDOXOMIL 40 MG/1
40 TABLET ORAL DAILY
Qty: 90 TABLET | Refills: 3 | Status: SHIPPED | OUTPATIENT
Start: 2021-12-30 | End: 2022-12-05

## 2021-12-30 RX ORDER — CHLORTHALIDONE 25 MG/1
12.5 TABLET ORAL DAILY
Qty: 90 TABLET | Refills: 0 | Status: SHIPPED | OUTPATIENT
Start: 2021-12-30 | End: 2022-01-07

## 2021-12-30 ASSESSMENT — MIFFLIN-ST. JEOR: SCORE: 1673.04

## 2021-12-30 NOTE — PROGRESS NOTES
he has Hypertension which is currently not well controlled. he has been compliant with his medications and is here today to follow up on the this issue and see if we can't work on better control of the blood pressure.  All the home numbers 155/82  Reviewed last 6 BP readings in chart:  BP Readings from Last 6 Encounters:   12/30/21 (!) 156/77   07/23/21 (!) 149/91   04/27/21 138/82   07/06/20 (!) 144/81   06/05/20 110/72   06/01/20 136/88     he  has not experienced any significant side effects from current medications for hypertension.    NO active cardiac complaints or symptoms with exercise.    Problem(s) Oriented visit      ROS:  General and Resp. completed and negative except as noted above     HISTORY:   reports current alcohol use of about 20.0 standard drinks of alcohol per week.   reports that he has been smoking cigarettes. He has been smoking about 0.10 packs per day. He has never used smokeless tobacco.    Past Medical History:   Diagnosis Date     ASCVD (arteriosclerotic cardiovascular disease)      Chest pain      HTN (hypertension) 2/27/2012     No past surgical history on file.    EXAM:  BP: 156/77   Pulse: 65    Temp: Data Unavailable    Wt Readings from Last 2 Encounters:   12/30/21 87.1 kg (192 lb)   07/23/21 86.6 kg (191 lb)       BMI= Body mass index is 26.78 kg/m .    EXAM:  APPEARANCE: = Relaxed and in no distress  Conj/Eyelids = noninjected and lids and lashes are without inflammation  PERRLA/Irises = Pupils Round Reactive to Light and Irisis without inflammation  Ears/Nose = External structures and Nares have usual shape and form  Ear canals and TM's = Canals are not inflammed and have none or little wax and the drums are not injected and have a light reflex   Lips/Teeth/Gums = No lesions seen, good dentition, and gums seem healthy  Oropharynx = No leukoplakia, No injection to the tissues, Normal Uvula  Neck = No anterior or posterior adenopathy appreciated.  Resp effort = Calm regular  "breathing  Breath Sounds = Good air movement with no rales or rhonchi in any lung fields  Mood/Affect = Cooperative and interested      Assessment/Plan:  Christophe was seen today for hypertension.    Diagnoses and all orders for this visit:    Essential hypertension  -     ND ART PRESS WAVEFORM ANALYS CENTRAL ART PRESSURE    Needs flu shot    Benign essential hypertension  -     olmesartan (BENICAR) 40 MG tablet; Take 1 tablet (40 mg) by mouth daily  -     chlorthalidone (HYGROTON) 25 MG tablet; Take 0.5 tablets (12.5 mg) by mouth daily  Reviewed his current HTN management. Discussed our goal for him is a systolic pressure at or below 128 and diastolic pressure at or below 83.  We today managed his prescriptions with refills ensured to ensure availabilty of current medications.  Discussed the importance for aggressive management of HTN to prevent vascular complications later.  Recommended lower fat, lower carbohydrate, and lower sodium (<2000 mg)diet. Required intervals for follow up on HTN, lab studies reviewed.    Strongly recommened he follow his blood pressures outside the clinic to ensure that BPs are remaining within guidelines,.  Instructed to contact me if the readings are not within guidelines on a regular basis so we can adjust treatment as needed.          COUNSELING:   reports that he has been smoking cigarettes. He has been smoking about 0.10 packs per day. He has never used smokeless tobacco.  Tobacco Cessation Action Plan: working on quitting!  Estimated body mass index is 26.78 kg/m  as calculated from the following:    Height as of this encounter: 1.803 m (5' 11\").    Weight as of this encounter: 87.1 kg (192 lb).       Appropriate preventive services were discussed with this patient, including applicable screening as appropriate for cardiovascular disease, diabetes, osteopenia/osteoporosis, and glaucoma.  As appropriate for age/gender, discussed screening for colorectal cancer, prostate cancer, breast " cancer, and cervical cancer. Checklist reviewing preventive services available has been given to the patient.    Reviewed patients plan of care and provided an AVS. The Basic Care Plan (routine screening as documented in Health Maintenance) for Christophe meets the Care Plan requirement. This Care Plan has been established and reviewed with the  Patient.      The following health maintenance items are reviewed in Epic and correct as of today:  Health Maintenance   Topic Date Due     HEPATITIS C SCREENING  Never done     ZOSTER IMMUNIZATION (1 of 2) Never done     MEDICARE ANNUAL WELLNESS VISIT  10/21/2020     FALL RISK ASSESSMENT  Never done     AORTIC ANEURYSM SCREENING (SYSTEM ASSIGNED)  Never done     LUNG CANCER SCREENING  12/23/2020     ADVANCE CARE PLANNING  01/26/2021     INFLUENZA VACCINE (1) Never done     Pneumococcal Vaccine: 65+ Years (1 of 1 - PPSV23) 10/31/2021     DTAP/TDAP/TD IMMUNIZATION (3 - Td or Tdap) 08/27/2023     COLORECTAL CANCER SCREENING  12/04/2024     LIPID  11/25/2025     PHQ-2  Completed     COVID-19 Vaccine  Completed     IPV IMMUNIZATION  Aged Out     MENINGITIS IMMUNIZATION  Aged Out     HEPATITIS B IMMUNIZATION  Aged Out       Jorge L Mullen  [facility]  For any issues my office # is [phone]

## 2022-01-04 ENCOUNTER — LAB (OUTPATIENT)
Dept: LAB | Facility: CLINIC | Age: 67
End: 2022-01-04
Payer: MEDICARE

## 2022-01-04 DIAGNOSIS — E78.00 PURE HYPERCHOLESTEROLEMIA: ICD-10-CM

## 2022-01-04 DIAGNOSIS — I25.10 ASCVD (ARTERIOSCLEROTIC CARDIOVASCULAR DISEASE): ICD-10-CM

## 2022-01-04 DIAGNOSIS — I10 ESSENTIAL HYPERTENSION: ICD-10-CM

## 2022-01-04 LAB
ALT SERPL W P-5'-P-CCNC: 29 U/L (ref 0–70)
ANION GAP SERPL CALCULATED.3IONS-SCNC: 5 MMOL/L (ref 3–14)
BUN SERPL-MCNC: 32 MG/DL (ref 7–30)
CALCIUM SERPL-MCNC: 8.9 MG/DL (ref 8.5–10.1)
CHLORIDE BLD-SCNC: 105 MMOL/L (ref 94–109)
CHOLEST SERPL-MCNC: 145 MG/DL
CO2 SERPL-SCNC: 28 MMOL/L (ref 20–32)
CREAT SERPL-MCNC: 1.18 MG/DL (ref 0.66–1.25)
FASTING STATUS PATIENT QL REPORTED: YES
GFR SERPL CREATININE-BSD FRML MDRD: 68 ML/MIN/1.73M2
GLUCOSE BLD-MCNC: 108 MG/DL (ref 70–99)
HDLC SERPL-MCNC: 69 MG/DL
LDLC SERPL CALC-MCNC: 58 MG/DL
NONHDLC SERPL-MCNC: 76 MG/DL
POTASSIUM BLD-SCNC: 4.5 MMOL/L (ref 3.4–5.3)
SODIUM SERPL-SCNC: 138 MMOL/L (ref 133–144)
TRIGL SERPL-MCNC: 89 MG/DL

## 2022-01-04 PROCEDURE — 80061 LIPID PANEL: CPT | Performed by: INTERNAL MEDICINE

## 2022-01-04 PROCEDURE — 80048 BASIC METABOLIC PNL TOTAL CA: CPT | Performed by: INTERNAL MEDICINE

## 2022-01-04 PROCEDURE — 36415 COLL VENOUS BLD VENIPUNCTURE: CPT | Performed by: INTERNAL MEDICINE

## 2022-01-04 PROCEDURE — 84460 ALANINE AMINO (ALT) (SGPT): CPT | Performed by: INTERNAL MEDICINE

## 2022-01-07 ENCOUNTER — OFFICE VISIT (OUTPATIENT)
Dept: CARDIOLOGY | Facility: CLINIC | Age: 67
End: 2022-01-07
Payer: MEDICARE

## 2022-01-07 VITALS
WEIGHT: 198 LBS | SYSTOLIC BLOOD PRESSURE: 145 MMHG | DIASTOLIC BLOOD PRESSURE: 82 MMHG | BODY MASS INDEX: 27.72 KG/M2 | HEART RATE: 57 BPM | HEIGHT: 71 IN

## 2022-01-07 DIAGNOSIS — I25.10 ASCVD (ARTERIOSCLEROTIC CARDIOVASCULAR DISEASE): ICD-10-CM

## 2022-01-07 DIAGNOSIS — I10 ESSENTIAL HYPERTENSION: Primary | ICD-10-CM

## 2022-01-07 DIAGNOSIS — G47.33 OSA (OBSTRUCTIVE SLEEP APNEA): ICD-10-CM

## 2022-01-07 DIAGNOSIS — I10 BENIGN ESSENTIAL HYPERTENSION: ICD-10-CM

## 2022-01-07 DIAGNOSIS — E78.00 PURE HYPERCHOLESTEROLEMIA: ICD-10-CM

## 2022-01-07 PROCEDURE — 99214 OFFICE O/P EST MOD 30 MIN: CPT | Performed by: INTERNAL MEDICINE

## 2022-01-07 RX ORDER — CHLORTHALIDONE 25 MG/1
25 TABLET ORAL DAILY
Qty: 90 TABLET | Refills: 3 | Status: SHIPPED | OUTPATIENT
Start: 2022-01-07 | End: 2022-02-01 | Stop reason: SINTOL

## 2022-01-07 ASSESSMENT — MIFFLIN-ST. JEOR: SCORE: 1700.25

## 2022-01-07 NOTE — LETTER
1/7/2022    Jorge L Mullen MD  [address]  [facility] [address]    RE: Christophe Del Rosario       Dear Colleague,     I had the pleasure of seeing Christophe Del Rosario in the [facility].  HPI and Plan:   See dictation        Orders Placed This Encounter   Procedures     Basic metabolic panel     Lipid Profile     ALT     Follow-Up with Cardiologist     SLEEP EVALUATION & MANAGEMENT REFERRAL - ADULT -       Orders Placed This Encounter   Medications     chlorthalidone (HYGROTON) 25 MG tablet     Sig: Take 1 tablet (25 mg) by mouth daily     Dispense:  90 tablet     Refill:  3       Medications Discontinued During This Encounter   Medication Reason     chlorthalidone (HYGROTON) 25 MG tablet Reorder         Encounter Diagnoses   Name Primary?     Essential hypertension Yes     Pure hypercholesterolemia      ASCVD (arteriosclerotic cardiovascular disease)      Benign essential hypertension      BARTOLO (obstructive sleep apnea)        CURRENT MEDICATIONS:  Current Outpatient Medications   Medication Sig Dispense Refill     aspirin 81 MG chewable tablet Take 1 tablet (81 mg) by mouth daily 108 tablet 3     atorvastatin (LIPITOR) 80 MG tablet Take 1 tablet (80 mg) by mouth daily 90 tablet 0     chlorthalidone (HYGROTON) 25 MG tablet Take 1 tablet (25 mg) by mouth daily 90 tablet 3     olmesartan (BENICAR) 40 MG tablet Take 1 tablet (40 mg) by mouth daily 90 tablet 3       ALLERGIES   No Known Allergies    PAST MEDICAL HISTORY:  Past Medical History:   Diagnosis Date     ASCVD (arteriosclerotic cardiovascular disease)      Chest pain      HTN (hypertension) 2/27/2012       PAST SURGICAL HISTORY:  No past surgical history on file.    FAMILY HISTORY:  Family History   Problem Relation Age of Onset     Coronary Artery Disease Father      Cerebrovascular Disease Brother 53        small stroke       SOCIAL HISTORY:  Social History     Socioeconomic History     Marital status:      Spouse name:  "Flory in 1996     Number of children: None     Years of education: None     Highest education level: None   Occupational History     Occupation:      Employer: [employer]   Tobacco Use     Smoking status: Current Every Day Smoker     Packs/day: 0.10     Types: Cigarettes     Smokeless tobacco: Never Used     Tobacco comment: 1-2 CIGS A DAY   Substance and Sexual Activity     Alcohol use: Yes     Alcohol/week: 20.0 standard drinks     Types: 20 Standard drinks or equivalent per week     Comment: 15-20 per week     Drug use: No     Sexual activity: Yes     Partners: Female   Other Topics Concern     Parent/sibling w/ CABG, MI or angioplasty before 65F 55M? Not Asked   Social History Narrative     None     Social Determinants of Health     Financial Resource Strain: Not on file   Food Insecurity: Not on file   Transportation Needs: Not on file   Physical Activity: Not on file   Stress: Not on file   Social Connections: Not on file   Intimate Partner Violence: Not on file   Housing Stability: Not on file       Review of Systems:  Skin:  Negative       Eyes:  Negative      ENT:  Negative      Respiratory:  Negative       Cardiovascular:  Negative      Gastroenterology: Negative      Genitourinary:  not assessed      Musculoskeletal:  Negative      Neurologic:  Negative      Psychiatric:  Negative      Heme/Lymph/Imm:  Negative      Endocrine:  Negative        Physical Exam:  Vitals: BP (!) 145/82   Pulse 57   Ht 1.803 m (5' 11\")   Wt 89.8 kg (198 lb)   BMI 27.62 kg/m      Constitutional:  cooperative, alert and oriented, well developed, well nourished, in no acute distress        Skin:  warm and dry to the touch, no apparent skin lesions or masses noted          Head:  normocephalic, no masses or lesions        Eyes:  pupils equal and round, conjunctivae and lids unremarkable, sclera white, no xanthalasma, EOMS intact, no nystagmus        Lymph:No Cervical lymphadenopathy present     ENT:  no pallor or " cyanosis        Neck:  carotid pulses are full and equal bilaterally, JVP normal, no carotid bruit        Respiratory:  normal breath sounds, clear to auscultation, normal A-P diameter, normal symmetry, normal respiratory excursion, no use of accessory muscles         Cardiac: regular rhythm, normal S1/S2, no S3 or S4, apical impulse not displaced, no murmurs, gallops or rubs occasional premature beats              pulses full and equal                                        GI:  not assessed this visit        Extremities and Muscular Skeletal:  no deformities, clubbing, cyanosis, erythema observed;no edema              Neurological:  no gross motor deficits;affect appropriate        Psych:  Alert and Oriented x 3        CC  No referring provider defined for this encounter.                  Thank you for allowing me to participate in the care of your patient.      Sincerely,     Reji Medellin MD, MD     [facility]  cc:   No referring provider defined for this encounter.

## 2022-01-07 NOTE — LETTER
1/7/2022       RE: Christophe Del Rosario  [address] [facility] [address]     Dear Colleague,    Thank you for referring your patient, Christophe Del Rosario, to the [facility] at [facility]. Please see a copy of my visit note below.    HPI and Plan:   See dictation      Orders Placed This Encounter   Procedures     Basic metabolic panel     Lipid Profile     ALT     Follow-Up with Cardiologist     SLEEP EVALUATION & MANAGEMENT REFERRAL - ADULT -       Orders Placed This Encounter   Medications     chlorthalidone (HYGROTON) 25 MG tablet     Sig: Take 1 tablet (25 mg) by mouth daily     Dispense:  90 tablet     Refill:  3       Medications Discontinued During This Encounter   Medication Reason     chlorthalidone (HYGROTON) 25 MG tablet Reorder         Encounter Diagnoses   Name Primary?     Essential hypertension Yes     Pure hypercholesterolemia      ASCVD (arteriosclerotic cardiovascular disease)      Benign essential hypertension      BARTOLO (obstructive sleep apnea)        CURRENT MEDICATIONS:  Current Outpatient Medications   Medication Sig Dispense Refill     aspirin 81 MG chewable tablet Take 1 tablet (81 mg) by mouth daily 108 tablet 3     atorvastatin (LIPITOR) 80 MG tablet Take 1 tablet (80 mg) by mouth daily 90 tablet 0     chlorthalidone (HYGROTON) 25 MG tablet Take 1 tablet (25 mg) by mouth daily 90 tablet 3     olmesartan (BENICAR) 40 MG tablet Take 1 tablet (40 mg) by mouth daily 90 tablet 3       ALLERGIES   No Known Allergies    PAST MEDICAL HISTORY:  Past Medical History:   Diagnosis Date     ASCVD (arteriosclerotic cardiovascular disease)      Chest pain      HTN (hypertension) 2/27/2012       PAST SURGICAL HISTORY:  No past surgical history on file.    FAMILY HISTORY:  Family History   Problem Relation Age of Onset     Coronary Artery Disease Father      Cerebrovascular Disease Brother 53        small stroke       SOCIAL  "HISTORY:  Social History     Socioeconomic History     Marital status:      Spouse name: Flory in 1996     Number of children: None     Years of education: None     Highest education level: None   Occupational History     Occupation: Propable     Employer: [employer]   Tobacco Use     Smoking status: Current Every Day Smoker     Packs/day: 0.10     Types: Cigarettes     Smokeless tobacco: Never Used     Tobacco comment: 1-2 CIGS A DAY   Substance and Sexual Activity     Alcohol use: Yes     Alcohol/week: 20.0 standard drinks     Types: 20 Standard drinks or equivalent per week     Comment: 15-20 per week     Drug use: No     Sexual activity: Yes     Partners: Female   Other Topics Concern     Parent/sibling w/ CABG, MI or angioplasty before 65F 55M? Not Asked   Social History Narrative     None     Social Determinants of Health     Financial Resource Strain: Not on file   Food Insecurity: Not on file   Transportation Needs: Not on file   Physical Activity: Not on file   Stress: Not on file   Social Connections: Not on file   Intimate Partner Violence: Not on file   Housing Stability: Not on file       Review of Systems:  Skin:  Negative       Eyes:  Negative      ENT:  Negative      Respiratory:  Negative       Cardiovascular:  Negative      Gastroenterology: Negative      Genitourinary:  not assessed      Musculoskeletal:  Negative      Neurologic:  Negative      Psychiatric:  Negative      Heme/Lymph/Imm:  Negative      Endocrine:  Negative        Physical Exam:  Vitals: BP (!) 145/82   Pulse 57   Ht 1.803 m (5' 11\")   Wt 89.8 kg (198 lb)   BMI 27.62 kg/m      Constitutional:  cooperative, alert and oriented, well developed, well nourished, in no acute distress        Skin:  warm and dry to the touch, no apparent skin lesions or masses noted          Head:  normocephalic, no masses or lesions        Eyes:  pupils equal and round, conjunctivae and lids unremarkable, sclera white, no xanthalasma, EOMS " intact, no nystagmus        Lymph:No Cervical lymphadenopathy present     ENT:  no pallor or cyanosis        Neck:  carotid pulses are full and equal bilaterally, JVP normal, no carotid bruit        Respiratory:  normal breath sounds, clear to auscultation, normal A-P diameter, normal symmetry, normal respiratory excursion, no use of accessory muscles         Cardiac: regular rhythm, normal S1/S2, no S3 or S4, apical impulse not displaced, no murmurs, gallops or rubs occasional premature beats              pulses full and equal                                        GI:  not assessed this visit        Extremities and Muscular Skeletal:  no deformities, clubbing, cyanosis, erythema observed;no edema              Neurological:  no gross motor deficits;affect appropriate        Psych:  Alert and Oriented x 3        CC  No referring provider defined for this encounter.                Service Date: 01/07/2022    CARDIOLOGY FOLLOWUP VISIT     REFERRING PHYSICIAN:  Jorge L Mullen MD    HISTORY OF PRESENT ILLNESS:  It is my pleasure to see your patient Christophe Del Rosario, who has a history of coronary artery disease based upon calcification noted on a calcium score and also has a history of essential hypertension.  If you remember, he also has a history of syncopal episodes with beta blockers.  Since I last saw him, he has been feeling well, but he has noticed that his blood pressure is on the higher side.  Chlorthalidone 12.5 mg was added to his olmesartan, which I fully agree with and which would be a prime recommendation from the American College of Cardiology guidelines on hypertension.  His blood pressure has improved, but has not returned to normal.  His blood pressure is 145/82 today.    With respect to his coronary artery disease, he has no chest pain, no chest pressure, no unusual shortness of breath.  His lipid profile, which was drawn 3 days ago, was excellent.  His LDL was 58, HDL was 69, and triglycerides were  89 with a total cholesterol of 145.  His basic metabolic profile also drawn on the 4th, 3 days ago, showed a sodium of 138 and potassium of 4.5.  His BUN was mildly raised at 32, but creatinine was normal at 1.18, and GFR was normal at 68.    Liver function tests were normal with an ALT of 29, indicating no hepatic toxicity from the atorvastatin medication.    IMPRESSION:    1.  Coronary artery disease.  The patient is asymptomatic with respect to coronary artery disease with no symptoms suggestive of angina pectoris.  2.  Excellent lipid profile, well within secondary prevention guidelines.  3.  Essential hypertension.  His blood pressure is significantly improved with chlorthalidone, but is not at goal yet.    4.  Obstructive sleep apnea.  The patient in the past has been diagnosed with obstructive sleep apnea, but by the patient's account, the degree of obstructive sleep apnea was quite mild and to such a degree that a CPAP machine would not be paid for by the insurance company.  That was quite a few years ago.  Untreated obstructive sleep apnea can make the treatment of hypertension resistant.    PLAN:    1.  We will increase the dose of chlorthalidone to 25 mg per day.  2.  We will have the patient see the [facility] for further investigations of sleep apnea and to see if he possibly needs a CPAP at this stage.  I will have the patient return to see us in 1 year's time.  At that stage, we will repeat a basic metabolic profile and lipid profile.    It has been my pleasure to be involved in the care of this very nice patient.  I look forward to seeing him again.    cc:  Jorge L Mullen MD   [facility] [address] [place] [address]      Reji Sahni MD, [facility]        D: 2022   T: 2022   MT: betsey    Name:     CHRYSTAL JACKSON  MRN:      [id number]        Account:      [id number]   :      1955           Service Date: 2022      Document: [id number]

## 2022-01-07 NOTE — PROGRESS NOTES
HPI and Plan:   See dictation        Orders Placed This Encounter   Procedures     Basic metabolic panel     Lipid Profile     ALT     Follow-Up with Cardiologist     SLEEP EVALUATION & MANAGEMENT REFERRAL - ADULT -       Orders Placed This Encounter   Medications     chlorthalidone (HYGROTON) 25 MG tablet     Sig: Take 1 tablet (25 mg) by mouth daily     Dispense:  90 tablet     Refill:  3       Medications Discontinued During This Encounter   Medication Reason     chlorthalidone (HYGROTON) 25 MG tablet Reorder         Encounter Diagnoses   Name Primary?     Essential hypertension Yes     Pure hypercholesterolemia      ASCVD (arteriosclerotic cardiovascular disease)      Benign essential hypertension      BARTOLO (obstructive sleep apnea)        CURRENT MEDICATIONS:  Current Outpatient Medications   Medication Sig Dispense Refill     aspirin 81 MG chewable tablet Take 1 tablet (81 mg) by mouth daily 108 tablet 3     atorvastatin (LIPITOR) 80 MG tablet Take 1 tablet (80 mg) by mouth daily 90 tablet 0     chlorthalidone (HYGROTON) 25 MG tablet Take 1 tablet (25 mg) by mouth daily 90 tablet 3     olmesartan (BENICAR) 40 MG tablet Take 1 tablet (40 mg) by mouth daily 90 tablet 3       ALLERGIES   No Known Allergies    PAST MEDICAL HISTORY:  Past Medical History:   Diagnosis Date     ASCVD (arteriosclerotic cardiovascular disease)      Chest pain      HTN (hypertension) 2/27/2012       PAST SURGICAL HISTORY:  No past surgical history on file.    FAMILY HISTORY:  Family History   Problem Relation Age of Onset     Coronary Artery Disease Father      Cerebrovascular Disease Brother 53        small stroke       SOCIAL HISTORY:  Social History     Socioeconomic History     Marital status:      Spouse name: Flory in 1996     Number of children: None     Years of education: None     Highest education level: None   Occupational History     Occupation:      Employer: [employer]   Tobacco Use     Smoking status:  "Current Every Day Smoker     Packs/day: 0.10     Types: Cigarettes     Smokeless tobacco: Never Used     Tobacco comment: 1-2 CIGS A DAY   Substance and Sexual Activity     Alcohol use: Yes     Alcohol/week: 20.0 standard drinks     Types: 20 Standard drinks or equivalent per week     Comment: 15-20 per week     Drug use: No     Sexual activity: Yes     Partners: Female   Other Topics Concern     Parent/sibling w/ CABG, MI or angioplasty before 65F 55M? Not Asked   Social History Narrative     None     Social Determinants of Health     Financial Resource Strain: Not on file   Food Insecurity: Not on file   Transportation Needs: Not on file   Physical Activity: Not on file   Stress: Not on file   Social Connections: Not on file   Intimate Partner Violence: Not on file   Housing Stability: Not on file       Review of Systems:  Skin:  Negative       Eyes:  Negative      ENT:  Negative      Respiratory:  Negative       Cardiovascular:  Negative      Gastroenterology: Negative      Genitourinary:  not assessed      Musculoskeletal:  Negative      Neurologic:  Negative      Psychiatric:  Negative      Heme/Lymph/Imm:  Negative      Endocrine:  Negative        Physical Exam:  Vitals: BP (!) 145/82   Pulse 57   Ht 1.803 m (5' 11\")   Wt 89.8 kg (198 lb)   BMI 27.62 kg/m      Constitutional:  cooperative, alert and oriented, well developed, well nourished, in no acute distress        Skin:  warm and dry to the touch, no apparent skin lesions or masses noted          Head:  normocephalic, no masses or lesions        Eyes:  pupils equal and round, conjunctivae and lids unremarkable, sclera white, no xanthalasma, EOMS intact, no nystagmus        Lymph:No Cervical lymphadenopathy present     ENT:  no pallor or cyanosis        Neck:  carotid pulses are full and equal bilaterally, JVP normal, no carotid bruit        Respiratory:  normal breath sounds, clear to auscultation, normal A-P diameter, normal symmetry, normal " respiratory excursion, no use of accessory muscles         Cardiac: regular rhythm, normal S1/S2, no S3 or S4, apical impulse not displaced, no murmurs, gallops or rubs occasional premature beats              pulses full and equal                                        GI:  not assessed this visit        Extremities and Muscular Skeletal:  no deformities, clubbing, cyanosis, erythema observed;no edema              Neurological:  no gross motor deficits;affect appropriate        Psych:  Alert and Oriented x 3        CC  No referring provider defined for this encounter.

## 2022-01-07 NOTE — PROGRESS NOTES
Service Date: 01/07/2022    CARDIOLOGY FOLLOWUP VISIT     REFERRING PHYSICIAN:  Jorge L Mullen MD    HISTORY OF PRESENT ILLNESS:  It is my pleasure to see your patient Christophe Del Rosario, who has a history of coronary artery disease based upon calcification noted on a calcium score and also has a history of essential hypertension.  If you remember, he also has a history of syncopal episodes with beta blockers.  Since I last saw him, he has been feeling well, but he has noticed that his blood pressure is on the higher side.  Chlorthalidone 12.5 mg was added to his olmesartan, which I fully agree with and which would be a prime recommendation from the American College of Cardiology guidelines on hypertension.  His blood pressure has improved, but has not returned to normal.  His blood pressure is 145/82 today.    With respect to his coronary artery disease, he has no chest pain, no chest pressure, no unusual shortness of breath.  His lipid profile, which was drawn 3 days ago, was excellent.  His LDL was 58, HDL was 69, and triglycerides were 89 with a total cholesterol of 145.  His basic metabolic profile also drawn on the 4th, 3 days ago, showed a sodium of 138 and potassium of 4.5.  His BUN was mildly raised at 32, but creatinine was normal at 1.18, and GFR was normal at 68.    Liver function tests were normal with an ALT of 29, indicating no hepatic toxicity from the atorvastatin medication.    IMPRESSION:    1.  Coronary artery disease.  The patient is asymptomatic with respect to coronary artery disease with no symptoms suggestive of angina pectoris.  2.  Excellent lipid profile, well within secondary prevention guidelines.  3.  Essential hypertension.  His blood pressure is significantly improved with chlorthalidone, but is not at goal yet.    4.  Obstructive sleep apnea.  The patient in the past has been diagnosed with obstructive sleep apnea, but by the patient's account, the degree of obstructive sleep apnea  was quite mild and to such a degree that a CPAP machine would not be paid for by the insurance company.  That was quite a few years ago.  Untreated obstructive sleep apnea can make the treatment of hypertension resistant.    PLAN:    1.  We will increase the dose of chlorthalidone to 25 mg per day.  2.  We will have the patient see the [facility] Disorder Clinic for further investigations of sleep apnea and to see if he possibly needs a CPAP at this stage.  I will have the patient return to see us in 1 year's time.  At that stage, we will repeat a basic metabolic profile and lipid profile.    It has been my pleasure to be involved in the care of this very nice patient.  I look forward to seeing him again.    cc:  Jorge L Mullen MD   [facility] [address] [place] [address]    Reji Sahni MD, [facility]        D: 2022   T: 2022   MT: betsey    Name:     CHRYSTAL JACKSONElton  MRN:      [id number]        Account:      [id number]   :      1955           Service Date: 2022       Document: [id number]

## 2022-01-11 ENCOUNTER — TELEPHONE (OUTPATIENT)
Dept: CARDIOLOGY | Facility: CLINIC | Age: 67
End: 2022-01-11
Payer: MEDICARE

## 2022-01-11 DIAGNOSIS — I25.10 ASCVD (ARTERIOSCLEROTIC CARDIOVASCULAR DISEASE): ICD-10-CM

## 2022-01-11 DIAGNOSIS — R03.0 ELEVATED BLOOD PRESSURE READING WITHOUT DIAGNOSIS OF HYPERTENSION: ICD-10-CM

## 2022-01-11 DIAGNOSIS — I10 ESSENTIAL HYPERTENSION: Primary | ICD-10-CM

## 2022-01-11 NOTE — TELEPHONE ENCOUNTER
Received call from pt stating that he increased his chlorthalidone to 25 mg daily on Saturday as Dr. Medellin recommended. He states his BP dropped to about 70/35 Saturday and states he was very lightheaded & dizzy that evening, but felt fine by the morning. He stopped the chlorthalidone after Saturday but Monday evening he again had low BP running about 90/55. [place] [facility] [place] [facility] now, he feels he is getting adequate hydration. BP this morning was 150/85 before any meds. Pt advised to stay off the chlorthalidone for now & will message Dr. Medellin to review. Nanci BARTON

## 2022-01-11 NOTE — TELEPHONE ENCOUNTER
Attempted to call pt with recommendations from Dr. Medellin. Recommendations sent in My Chart message with request for pt to call back to schedule BP monitor or with questions or concerns. Nanci BARTON    supplements ordered, monitor labs- corrected

## 2022-01-11 NOTE — TELEPHONE ENCOUNTER
Would stay off chlorthalidone and when he comes back from [place] would check a 24 hour BP monitor as BP response to meds are very odd. [email or web address]

## 2022-01-25 ENCOUNTER — HOSPITAL ENCOUNTER (OUTPATIENT)
Dept: CARDIOLOGY | Facility: CLINIC | Age: 67
Discharge: HOME | End: 2022-01-25
Attending: INTERNAL MEDICINE | Admitting: INTERNAL MEDICINE
Payer: MEDICARE

## 2022-01-25 DIAGNOSIS — R03.0 ELEVATED BLOOD PRESSURE READING WITHOUT DIAGNOSIS OF HYPERTENSION: ICD-10-CM

## 2022-01-25 PROCEDURE — 93786 AMBL BP MNTR W/SW REC ONLY: CPT

## 2022-01-25 PROCEDURE — 93790 AMBL BP MNTR W/SW I&R: CPT | Performed by: INTERNAL MEDICINE

## 2022-01-25 PROCEDURE — 93788 AMBL BP MNTR W/SW A/R: CPT

## 2022-02-02 DIAGNOSIS — I10 ESSENTIAL HYPERTENSION: ICD-10-CM

## 2022-02-02 DIAGNOSIS — I25.10 ASCVD (ARTERIOSCLEROTIC CARDIOVASCULAR DISEASE): ICD-10-CM

## 2022-02-02 RX ORDER — AMLODIPINE BESYLATE 5 MG/1
5 TABLET ORAL DAILY
Qty: 90 TABLET | Refills: 3 | Status: SHIPPED | OUTPATIENT
Start: 2022-02-02 | End: 2022-02-22

## 2022-02-02 RX ORDER — AMLODIPINE BESYLATE 5 MG/1
5 TABLET ORAL DAILY
Qty: 30 TABLET | Refills: 3 | Status: SHIPPED | OUTPATIENT
Start: 2022-02-02 | End: 2022-02-02

## 2022-02-02 NOTE — TELEPHONE ENCOUNTER
Called pt with results of BP monitor & recommendations from Dr. Medellin to start amlodipine 5 mg daily. Prescription escripted to [facility] as requested. Nanci BARTON

## 2022-02-07 ENCOUNTER — TELEPHONE (OUTPATIENT)
Dept: CARDIOLOGY | Facility: CLINIC | Age: 67
End: 2022-02-07
Payer: MEDICARE

## 2022-02-07 NOTE — TELEPHONE ENCOUNTER
[facility] Call Center    Phone Message    May a detailed message be left on voicemail: yes     Reason for Call: Other: Pt called in wanting to discuss how his BP has been since recent med adjustment, please c/b to discuss     Action Taken: Message routed to:  Other: [facility] cardio    Travel Screening: Not Applicable

## 2022-02-09 NOTE — TELEPHONE ENCOUNTER
Spoke to patient regarding his BP  He states this morning his BP was 154/100  He started 5mg amlodipine last week 02/02/2022  He stopped chlorthalidone as recommended.  He is still taking olmesartan  He will call Dr. Jorge L Mullen, PCP today since Dr. VÁSQUEZ is not available this week  We scheduled a follow up with Dr. Medellin for Monday Feb 14th which patient can cancel if not needed.  Left Team 5 direct telephone number to call for questions  [phone]

## 2022-02-10 ENCOUNTER — OFFICE VISIT (OUTPATIENT)
Dept: FAMILY MEDICINE | Facility: CLINIC | Age: 67
End: 2022-02-10

## 2022-02-10 VITALS
BODY MASS INDEX: 28.17 KG/M2 | OXYGEN SATURATION: 99 % | HEART RATE: 65 BPM | WEIGHT: 202 LBS | DIASTOLIC BLOOD PRESSURE: 84 MMHG | SYSTOLIC BLOOD PRESSURE: 150 MMHG

## 2022-02-10 DIAGNOSIS — R09.89 LABILE HYPERTENSION: ICD-10-CM

## 2022-02-10 DIAGNOSIS — I10 ESSENTIAL HYPERTENSION: Primary | ICD-10-CM

## 2022-02-10 DIAGNOSIS — Z23 FLU VACCINE NEED: ICD-10-CM

## 2022-02-10 DIAGNOSIS — Z23 NEED FOR PNEUMOCOCCAL VACCINE: ICD-10-CM

## 2022-02-10 PROCEDURE — 90732 PPSV23 VACC 2 YRS+ SUBQ/IM: CPT | Performed by: FAMILY MEDICINE

## 2022-02-10 PROCEDURE — 99214 OFFICE O/P EST MOD 30 MIN: CPT | Mod: 25 | Performed by: FAMILY MEDICINE

## 2022-02-10 PROCEDURE — 93050 ART PRESSURE WAVEFORM ANALYS: CPT | Performed by: FAMILY MEDICINE

## 2022-02-10 PROCEDURE — 90694 VACC AIIV4 NO PRSRV 0.5ML IM: CPT | Performed by: FAMILY MEDICINE

## 2022-02-10 PROCEDURE — G0008 ADMIN INFLUENZA VIRUS VAC: HCPCS | Mod: 59 | Performed by: FAMILY MEDICINE

## 2022-02-10 PROCEDURE — 36415 COLL VENOUS BLD VENIPUNCTURE: CPT | Performed by: FAMILY MEDICINE

## 2022-02-10 PROCEDURE — G0009 ADMIN PNEUMOCOCCAL VACCINE: HCPCS | Mod: 59 | Performed by: FAMILY MEDICINE

## 2022-02-10 NOTE — PROGRESS NOTES
he has Hypertension which is currently not well controlled. he has been compliant with his medications and is here today to follow up on the this issue and see if we can't work on better control of the blood pressure.    Reviewed last 6 BP readings in chart:  BP Readings from Last 6 Encounters:   02/10/22 (!) 150/84   01/07/22 (!) 145/82   12/30/21 (!) 156/77   07/23/21 (!) 149/91   04/27/21 138/82   07/06/20 (!) 144/81     he  has not experienced any significant side effects from current medications for hypertension.    NO active cardiac complaints or symptoms with exercise.  Note from cardiology reviewed.    Now on the amlodapine 5 daily.  190's systolic.    .  Problem(s) Oriented visit      ROS:  General and Resp. completed and negative except as noted above     HISTORY:   reports current alcohol use of about 20.0 standard drinks of alcohol per week.   reports that he has been smoking cigarettes. He has been smoking about 0.10 packs per day. He has never used smokeless tobacco.    Past Medical History:   Diagnosis Date     ASCVD (arteriosclerotic cardiovascular disease)      Chest pain      HTN (hypertension) 2/27/2012     No past surgical history on file.    EXAM:  BP: 150/84[AtCor[   Pulse: 65    Temp: Data Unavailable    Wt Readings from Last 2 Encounters:   02/10/22 91.6 kg (202 lb)   01/07/22 89.8 kg (198 lb)       BMI= Body mass index is 28.17 kg/m .    EXAM:  APPEARANCE: = Relaxed and in no distress  Conj/Eyelids = noninjected and lids and lashes are without inflammation  PERRLA/Irises = Pupils Round Reactive to Light and Irisis without inflammation  Neck = No anterior or posterior adenopathy appreciated.  Thyroid = Not enlarged and no masses felt  Resp effort = Calm regular breathing  Breath Sounds = Good air movement with no rales or rhonchi in any lung fields  Heart Rate, Rythym, & sounds (no Murm)  = Regular rate and rythym with no S3, S4, or murmer appreciated.  Carotid Art's = Pulses full and equal  "and no bruits appreciated  Abdomen = Soft, nontender, no masses, & bowel sounds in all quadrants  Liver/Spleen = Normal span and no splenomegaly noted  Digits and Nails = FROM in all finger joints, no nail dystrophy  Ext (edema) = No pretibial edema noted or elsewhere  Musculsktl =  Strength and ROM of major joints are within normal limits  SKIN = absent significant rashes or lesions   Recent/Remote Memory = Alert and Oriented x 3  Mood/Affect = Cooperative and interested      Assessment/Plan:  Christophe was seen today for hypertension.    Diagnoses and all orders for this visit:    Essential hypertension  -     VA ART PRESS WAVEFORM ANALYS CENTRAL ART PRESSURE    Labile hypertension  -     VMA urine; Future  -     Metanephrine random or 24 hr urine; Future  -     Catecholamines fractioned free urine; Future  -     Catecholamines Fractionated; Future    appreciate cardiology care  let's leave just on this med for the moment, due to see Cards on Monday  His bp has been so high and low, will check for pheo...    COUNSELING:   reports that he has been smoking cigarettes. He has been smoking about 0.10 packs per day. He has never used smokeless tobacco.    Estimated body mass index is 28.17 kg/m  as calculated from the following:    Height as of 1/7/22: 1.803 m (5' 11\").    Weight as of this encounter: 91.6 kg (202 lb).       Appropriate preventive services were discussed with this patient, including applicable screening as appropriate for cardiovascular disease, diabetes, osteopenia/osteoporosis, and glaucoma.  As appropriate for age/gender, discussed screening for colorectal cancer, prostate cancer, breast cancer, and cervical cancer. Checklist reviewing preventive services available has been given to the patient.    Reviewed patients plan of care and provided an AVS. The Basic Care Plan (routine screening as documented in Health Maintenance) for Christophe meets the Care Plan requirement. This Care Plan has been established " and reviewed with the  Patient.      The following health maintenance items are reviewed in Epic and correct as of today:  Health Maintenance   Topic Date Due     HEPATITIS C SCREENING  Never done     ZOSTER IMMUNIZATION (1 of 2) Never done     MEDICARE ANNUAL WELLNESS VISIT  10/21/2020     FALL RISK ASSESSMENT  Never done     Pneumococcal Vaccine: 65+ Years (1 of 1 - PPSV23) 10/21/2020     AORTIC ANEURYSM SCREENING (SYSTEM ASSIGNED)  Never done     LUNG CANCER SCREENING  12/23/2020     ADVANCE CARE PLANNING  01/26/2021     INFLUENZA VACCINE (1) Never done     DTAP/TDAP/TD IMMUNIZATION (3 - Td or Tdap) 08/27/2023     COLORECTAL CANCER SCREENING  12/04/2024     LIPID  01/04/2027     PHQ-2  Completed     COVID-19 Vaccine  Completed     IPV IMMUNIZATION  Aged Out     MENINGITIS IMMUNIZATION  Aged Out     HEPATITIS B IMMUNIZATION  Aged Out       Jorge L Mullen  [facility]  For any issues my office # is [phone]

## 2022-02-14 DIAGNOSIS — I10 ESSENTIAL HYPERTENSION: ICD-10-CM

## 2022-02-14 DIAGNOSIS — R09.89 LABILE HYPERTENSION: ICD-10-CM

## 2022-02-17 LAB
DOPAMINE: 34 PG/ML (ref 0–48)
Lab: 30 PG/ML (ref 0–62)
NOREPINEPHRINE: 512 PG/ML (ref 0–874)

## 2022-02-19 ENCOUNTER — HEALTH MAINTENANCE LETTER (OUTPATIENT)
Age: 67
End: 2022-02-19

## 2022-02-22 ENCOUNTER — OFFICE VISIT (OUTPATIENT)
Dept: CARDIOLOGY | Facility: CLINIC | Age: 67
End: 2022-02-22
Payer: MEDICARE

## 2022-02-22 VITALS
DIASTOLIC BLOOD PRESSURE: 80 MMHG | BODY MASS INDEX: 28.06 KG/M2 | OXYGEN SATURATION: 95 % | WEIGHT: 200.4 LBS | SYSTOLIC BLOOD PRESSURE: 158 MMHG | HEART RATE: 85 BPM | HEIGHT: 71 IN

## 2022-02-22 DIAGNOSIS — I25.10 ASCVD (ARTERIOSCLEROTIC CARDIOVASCULAR DISEASE): ICD-10-CM

## 2022-02-22 DIAGNOSIS — R55 SYNCOPE, UNSPECIFIED SYNCOPE TYPE: ICD-10-CM

## 2022-02-22 DIAGNOSIS — I25.10 CORONARY ARTERY DISEASE INVOLVING NATIVE CORONARY ARTERY OF NATIVE HEART WITHOUT ANGINA PECTORIS: ICD-10-CM

## 2022-02-22 DIAGNOSIS — E78.00 PURE HYPERCHOLESTEROLEMIA: ICD-10-CM

## 2022-02-22 DIAGNOSIS — I10 ESSENTIAL HYPERTENSION: Primary | ICD-10-CM

## 2022-02-22 DIAGNOSIS — I10 BENIGN ESSENTIAL HYPERTENSION: ICD-10-CM

## 2022-02-22 DIAGNOSIS — G47.33 OSA (OBSTRUCTIVE SLEEP APNEA): ICD-10-CM

## 2022-02-22 DIAGNOSIS — R03.0 ELEVATED BLOOD PRESSURE READING WITHOUT DIAGNOSIS OF HYPERTENSION: ICD-10-CM

## 2022-02-22 PROCEDURE — 99213 OFFICE O/P EST LOW 20 MIN: CPT | Performed by: INTERNAL MEDICINE

## 2022-02-22 RX ORDER — AMLODIPINE BESYLATE 5 MG/1
10 TABLET ORAL DAILY
Qty: 90 TABLET | Refills: 3 | COMMUNITY
Start: 2022-02-22 | End: 2022-03-23

## 2022-02-22 RX ORDER — HYDROCHLOROTHIAZIDE 12.5 MG/1
12.5 CAPSULE ORAL DAILY
Qty: 30 CAPSULE | Refills: 11 | Status: SHIPPED | OUTPATIENT
Start: 2022-02-22 | End: 2022-02-23

## 2022-02-22 NOTE — PROGRESS NOTES
Service Date: 02/22/2022    REFERRING PHYSICIAN:  Jorge L Mullen MD    HISTORY OF PRESENT ILLNESS:  It is my pleasure to see your patient, Christophe Del Rosario, who is a very pleasant 66-year-old patient with a known history of coronary artery disease based upon the presence of coronary calcification.  He also has a history of essential hypertension.  He had syncopal episodes, which were felt to be due to metoprolol.  With the elimination of metoprolol, no further episodes of syncope occurred.  We did start the patient on chlorthalidone for better blood pressure control.  At the 12.5 mg dose, his blood pressure was not well controlled.  However, then at the 25 mg dose, the patient's blood pressure dropped to 70/35.  He was lightheaded and dizzy in the evening time.  He stopped the chlorthalidone, but the following Monday his blood pressure again was running low at 90/55.  After about 2 days off the chlorthalidone, his blood pressure was 150/85.  This is quite an unusual response to the chlorthalidone in that his blood pressure was not well controlled at 12.5 and he had this huge drop in blood pressure when the dose was increased to the next highest dose, which is 25 mg.  The patient has been started on Norvasc, but his blood pressure is still not well controlled and blood pressure today was 158/80.  24-hour blood pressure monitoring, which was performed on 01/25/2022, which is approximately 4 weeks ago, confirmed that he has essential hypertension and it is not white coat hypertension.  The blood pressure in waking periods on average was 161/84 and at nighttime is 116/62.  The overall blood pressure was 146/77.  Dr. Mullen did check serum norepinephrine, dopamine and epinephrine and all of these were normal, indicating that he does not have pheochromocytoma.  Today, blood pressure is still raised at 158/80.  The patient is due to see the sleep study people since obstructive sleep apnea is associated with resistance to  the treatment of hypertension.  Unfortunately, there appears to be quite a back log in the [facility] since he tried to make the appointment when I saw him at the beginning of January and the next appointment is actually in April.    IMPRESSION:    1.  Resistant hypertension.  The cause of this is unclear.  Untreated obstructive sleep apnea could certainly cause resistant hypertension.  He had a very unusual response with significant drop in blood pressure when going from 12.5 mg of chlorthalidone to 25 mg.  2.  Coronary calcification with excellent lipid profile in January as I mentioned in the past.    PLAN:  We will increase the dose of amlodipine to 10 mg per day and I will also add in hydrochlorothiazide 12.5 mg per day.  In 2-3 weeks' time, I will have him follow up with a basic metabolic profile and a nurse visit to check his blood pressure.    It is my pleasure to be involved in the care of this very nice patient.  As always, he has been told to contact me if he has any questions or any concerns.      Reji Sahni MD, FACC     cc:  Jorge L Mullen MD  [facility] [address] [place] [address]    Reji Sahni MD, FACC        D: 2022   T: 2022   MT: LILA    Name:     CHRYSTAL JACKSON  MRN:      [id number]        Account:      [id number]   :      1955           Service Date: 2022       Document: [id number]

## 2022-02-22 NOTE — PROGRESS NOTES
HPI and Plan:   See dictation          Orders Placed This Encounter   Procedures     Basic metabolic panel     Basic metabolic panel     Follow-Up with Cardiology Nurse     Follow-Up with Cardiology       Orders Placed This Encounter   Medications     amLODIPine (NORVASC) 5 MG tablet     Sig: Take 2 tablets (10 mg) by mouth daily     Dispense:  90 tablet     Refill:  3     hydrochlorothiazide (MICROZIDE) 12.5 MG capsule     Sig: Take 1 capsule (12.5 mg) by mouth daily     Dispense:  30 capsule     Refill:  11       Medications Discontinued During This Encounter   Medication Reason     amLODIPine (NORVASC) 5 MG tablet          Encounter Diagnoses   Name Primary?     Essential hypertension Yes     ASCVD (arteriosclerotic cardiovascular disease)      Elevated blood pressure reading without diagnosis of hypertension      Pure hypercholesterolemia      Benign essential hypertension      BARTOLO (obstructive sleep apnea)      Coronary artery disease involving native coronary artery of native heart without angina pectoris      Syncope, unspecified syncope type        CURRENT MEDICATIONS:  Current Outpatient Medications   Medication Sig Dispense Refill     amLODIPine (NORVASC) 5 MG tablet Take 2 tablets (10 mg) by mouth daily 90 tablet 3     aspirin 81 MG chewable tablet Take 1 tablet (81 mg) by mouth daily 108 tablet 3     atorvastatin (LIPITOR) 80 MG tablet Take 1 tablet (80 mg) by mouth daily 90 tablet 0     hydrochlorothiazide (MICROZIDE) 12.5 MG capsule Take 1 capsule (12.5 mg) by mouth daily 30 capsule 11     olmesartan (BENICAR) 40 MG tablet Take 1 tablet (40 mg) by mouth daily 90 tablet 3       ALLERGIES   No Known Allergies    PAST MEDICAL HISTORY:  Past Medical History:   Diagnosis Date     ASCVD (arteriosclerotic cardiovascular disease)      Chest pain      HTN (hypertension) 2/27/2012       PAST SURGICAL HISTORY:  No past surgical history on file.    FAMILY HISTORY:  Family History   Problem Relation Age of Onset  "    Coronary Artery Disease Father      Cerebrovascular Disease Brother 53        small stroke       SOCIAL HISTORY:  Social History     Socioeconomic History     Marital status:      Spouse name: Flory in 1996     Number of children: None     Years of education: None     Highest education level: None   Occupational History     Occupation: [employer]     Employer: [employer]   Tobacco Use     Smoking status: Current Every Day Smoker     Packs/day: 0.10     Types: Cigarettes     Smokeless tobacco: Never Used     Tobacco comment: 1-2 CIGS A DAY   Substance and Sexual Activity     Alcohol use: Yes     Alcohol/week: 20.0 standard drinks     Types: 20 Standard drinks or equivalent per week     Comment: 15-20 per week     Drug use: No     Sexual activity: Yes     Partners: Female   Other Topics Concern     Parent/sibling w/ CABG, MI or angioplasty before 65F 55M? Not Asked   Social History Narrative     None     Social Determinants of Health     Financial Resource Strain: Not on file   Food Insecurity: Not on file   Transportation Needs: Not on file   Physical Activity: Not on file   Stress: Not on file   Social Connections: Not on file   Intimate Partner Violence: Not on file   Housing Stability: Not on file       Review of Systems:  Skin:  not assessed       Eyes:  not assessed      ENT:  not assessed      Respiratory:  Negative       Cardiovascular:  Negative      Gastroenterology: not assessed      Genitourinary:  not assessed      Musculoskeletal:  not assessed      Neurologic:  not assessed      Psychiatric:         Heme/Lymph/Imm:  not assessed      Endocrine:  Negative        Physical Exam:  Vitals: BP (!) 158/80 (BP Location: Right arm, Patient Position: Sitting, Cuff Size: Adult Regular)   Pulse 85   Ht 1.803 m (5' 11\")   Wt 90.9 kg (200 lb 6.4 oz)   SpO2 95%   BMI 27.95 kg/m      Constitutional:  cooperative, alert and oriented, well developed, well nourished, in no acute distress        Skin:  warm " and dry to the touch, no apparent skin lesions or masses noted          Head:  normocephalic, no masses or lesions        Eyes:  pupils equal and round, conjunctivae and lids unremarkable, sclera white, no xanthalasma, EOMS intact, no nystagmus        Lymph:No Cervical lymphadenopathy present     ENT:  no pallor or cyanosis        Neck:  carotid pulses are full and equal bilaterally, JVP normal, no carotid bruit        Respiratory:  normal breath sounds, clear to auscultation, normal A-P diameter, normal symmetry, normal respiratory excursion, no use of accessory muscles         Cardiac: regular rhythm, normal S1/S2, no S3 or S4, apical impulse not displaced, no murmurs, gallops or rubs                pulses full and equal                                        GI:  not assessed this visit        Extremities and Muscular Skeletal:  no deformities, clubbing, cyanosis, erythema observed;no edema              Neurological:  no gross motor deficits;affect appropriate        Psych:  Alert and Oriented x 3        CC  Reji Medellin MD  [address]  [place] [id number]

## 2022-02-22 NOTE — LETTER
2/22/2022    Jorge L Mullen MD  [address]  [facility] [address]    RE: Christophe Del Rosario       Dear Colleague,     I had the pleasure of seeing Christophe Del Rosario in the [facility].  HPI and Plan:   See dictation          Orders Placed This Encounter   Procedures     Basic metabolic panel     Basic metabolic panel     Follow-Up with Cardiology Nurse     Follow-Up with Cardiology       Orders Placed This Encounter   Medications     amLODIPine (NORVASC) 5 MG tablet     Sig: Take 2 tablets (10 mg) by mouth daily     Dispense:  90 tablet     Refill:  3     hydrochlorothiazide (MICROZIDE) 12.5 MG capsule     Sig: Take 1 capsule (12.5 mg) by mouth daily     Dispense:  30 capsule     Refill:  11       Medications Discontinued During This Encounter   Medication Reason     amLODIPine (NORVASC) 5 MG tablet          Encounter Diagnoses   Name Primary?     Essential hypertension Yes     ASCVD (arteriosclerotic cardiovascular disease)      Elevated blood pressure reading without diagnosis of hypertension      Pure hypercholesterolemia      Benign essential hypertension      BARTOLO (obstructive sleep apnea)      Coronary artery disease involving native coronary artery of native heart without angina pectoris      Syncope, unspecified syncope type        CURRENT MEDICATIONS:  Current Outpatient Medications   Medication Sig Dispense Refill     amLODIPine (NORVASC) 5 MG tablet Take 2 tablets (10 mg) by mouth daily 90 tablet 3     aspirin 81 MG chewable tablet Take 1 tablet (81 mg) by mouth daily 108 tablet 3     atorvastatin (LIPITOR) 80 MG tablet Take 1 tablet (80 mg) by mouth daily 90 tablet 0     hydrochlorothiazide (MICROZIDE) 12.5 MG capsule Take 1 capsule (12.5 mg) by mouth daily 30 capsule 11     olmesartan (BENICAR) 40 MG tablet Take 1 tablet (40 mg) by mouth daily 90 tablet 3       ALLERGIES   No Known Allergies    PAST MEDICAL HISTORY:  Past Medical History:   Diagnosis Date     ASCVD  (arteriosclerotic cardiovascular disease)      Chest pain      HTN (hypertension) 2/27/2012       PAST SURGICAL HISTORY:  No past surgical history on file.    FAMILY HISTORY:  Family History   Problem Relation Age of Onset     Coronary Artery Disease Father      Cerebrovascular Disease Brother 53        small stroke       SOCIAL HISTORY:  Social History     Socioeconomic History     Marital status:      Spouse name: Flory in 1996     Number of children: None     Years of education: None     Highest education level: None   Occupational History     Occupation: [employer]     Employer: [employer]   Tobacco Use     Smoking status: Current Every Day Smoker     Packs/day: 0.10     Types: Cigarettes     Smokeless tobacco: Never Used     Tobacco comment: 1-2 CIGS A DAY   Substance and Sexual Activity     Alcohol use: Yes     Alcohol/week: 20.0 standard drinks     Types: 20 Standard drinks or equivalent per week     Comment: 15-20 per week     Drug use: No     Sexual activity: Yes     Partners: Female   Other Topics Concern     Parent/sibling w/ CABG, MI or angioplasty before 65F 55M? Not Asked   Social History Narrative     None     Social Determinants of Health     Financial Resource Strain: Not on file   Food Insecurity: Not on file   Transportation Needs: Not on file   Physical Activity: Not on file   Stress: Not on file   Social Connections: Not on file   Intimate Partner Violence: Not on file   Housing Stability: Not on file       Review of Systems:  Skin:  not assessed       Eyes:  not assessed      ENT:  not assessed      Respiratory:  Negative       Cardiovascular:  Negative      Gastroenterology: not assessed      Genitourinary:  not assessed      Musculoskeletal:  not assessed      Neurologic:  not assessed      Psychiatric:         Heme/Lymph/Imm:  not assessed      Endocrine:  Negative        Physical Exam:  Vitals: BP (!) 158/80 (BP Location: Right arm, Patient Position: Sitting, Cuff Size: Adult  "Regular)   Pulse 85   Ht 1.803 m (5' 11\")   Wt 90.9 kg (200 lb 6.4 oz)   SpO2 95%   BMI 27.95 kg/m      Constitutional:  cooperative, alert and oriented, well developed, well nourished, in no acute distress        Skin:  warm and dry to the touch, no apparent skin lesions or masses noted          Head:  normocephalic, no masses or lesions        Eyes:  pupils equal and round, conjunctivae and lids unremarkable, sclera white, no xanthalasma, EOMS intact, no nystagmus        Lymph:No Cervical lymphadenopathy present     ENT:  no pallor or cyanosis        Neck:  carotid pulses are full and equal bilaterally, JVP normal, no carotid bruit        Respiratory:  normal breath sounds, clear to auscultation, normal A-P diameter, normal symmetry, normal respiratory excursion, no use of accessory muscles         Cardiac: regular rhythm, normal S1/S2, no S3 or S4, apical impulse not displaced, no murmurs, gallops or rubs                pulses full and equal                                        GI:  not assessed this visit        Extremities and Muscular Skeletal:  no deformities, clubbing, cyanosis, erythema observed;no edema              Neurological:  no gross motor deficits;affect appropriate        Psych:  Alert and Oriented x 3        CC  Reji Medellin MD  [address]  [place] [id number]                  Thank you for allowing me to participate in the care of your patient.      Sincerely,     Reji Medellin MD, MD     [facility]  cc:   Reji Medellin MD  [address]  [place] [id number]        "

## 2022-02-23 DIAGNOSIS — I10 ESSENTIAL HYPERTENSION: ICD-10-CM

## 2022-02-23 RX ORDER — HYDROCHLOROTHIAZIDE 12.5 MG/1
12.5 CAPSULE ORAL DAILY
Qty: 90 CAPSULE | Refills: 3 | Status: SHIPPED | OUTPATIENT
Start: 2022-02-23 | End: 2022-05-13 | Stop reason: SINTOL

## 2022-02-25 LAB
DOPAMINE, UR, 24HR: 360 UG/24 HR (ref 0–510)
DOPAMINE, URINE: 124 UG/L
EPINEPHRINE, URINE: 4 UG/L
Lab: 12 UG/24 HR (ref 0–20)
Lab: 179 UG/L
METANEPHRINE, U,24HR: 244 UG/24 HR (ref 58–276)
METANEPHRINE, UR: 84 UG/L
NOREPINEPHRINE, UR: 34 UG/L
NOREPINEPHRINE,U,24H: 99 UG/24 HR (ref 0–135)
NORMETANEPHR.,U,24H: 519 UG/24 HR (ref 156–729)
VMA, URINE, 24HR: 4.1 MG/24 HR (ref 0–7.5)
VMA, URINE: 1.4 MG/L

## 2022-02-28 NOTE — RESULT ENCOUNTER NOTE
Dear Christophe,   I am writing to report that your included test results are as expected.  Many lab panels contain some results that are outside of the normal range, I have reviewed each of these results and they require no changes at this time.    Jorge L Mullen MD

## 2022-03-11 ENCOUNTER — VIRTUAL VISIT (OUTPATIENT)
Dept: SLEEP MEDICINE | Facility: CLINIC | Age: 67
End: 2022-03-11
Attending: INTERNAL MEDICINE
Payer: MEDICARE

## 2022-03-11 VITALS
DIASTOLIC BLOOD PRESSURE: 80 MMHG | SYSTOLIC BLOOD PRESSURE: 158 MMHG | WEIGHT: 192 LBS | BODY MASS INDEX: 26.88 KG/M2 | HEIGHT: 71 IN

## 2022-03-11 DIAGNOSIS — G47.33 OSA (OBSTRUCTIVE SLEEP APNEA): Primary | ICD-10-CM

## 2022-03-11 PROCEDURE — 99203 OFFICE O/P NEW LOW 30 MIN: CPT | Mod: 95 | Performed by: INTERNAL MEDICINE

## 2022-03-11 ASSESSMENT — SLEEP AND FATIGUE QUESTIONNAIRES
HOW LIKELY ARE YOU TO NOD OFF OR FALL ASLEEP IN A CAR, WHILE STOPPED FOR A FEW MINUTES IN TRAFFIC: WOULD NEVER DOZE
HOW LIKELY ARE YOU TO NOD OFF OR FALL ASLEEP WHILE SITTING AND READING: MODERATE CHANCE OF DOZING
HOW LIKELY ARE YOU TO NOD OFF OR FALL ASLEEP WHILE SITTING AND TALKING TO SOMEONE: WOULD NEVER DOZE
HOW LIKELY ARE YOU TO NOD OFF OR FALL ASLEEP WHILE SITTING INACTIVE IN A PUBLIC PLACE: WOULD NEVER DOZE
HOW LIKELY ARE YOU TO NOD OFF OR FALL ASLEEP WHEN YOU ARE A PASSENGER IN A CAR FOR AN HOUR WITHOUT A BREAK: SLIGHT CHANCE OF DOZING
HOW LIKELY ARE YOU TO NOD OFF OR FALL ASLEEP WHILE LYING DOWN TO REST IN THE AFTERNOON WHEN CIRCUMSTANCES PERMIT: SLIGHT CHANCE OF DOZING
HOW LIKELY ARE YOU TO NOD OFF OR FALL ASLEEP WHILE WATCHING TV: MODERATE CHANCE OF DOZING
HOW LIKELY ARE YOU TO NOD OFF OR FALL ASLEEP WHILE SITTING QUIETLY AFTER LUNCH WITHOUT ALCOHOL: WOULD NEVER DOZE

## 2022-03-11 ASSESSMENT — PAIN SCALES - GENERAL: PAINLEVEL: NO PAIN (0)

## 2022-03-11 NOTE — PROGRESS NOTES
Jag is a 66 year old who is being evaluated via a billable video visit.      How would you like to obtain your AVS? MyChart  If the video visit is dropped, the invitation should be resent by: Send to e-mail at: [email or web address].  Will anyone else be joining your video visit? Dali       Mary Singh    Video Start Time: 1:00 PM  Video-Visit Details    Type of service:  Video Visit    Video End Time:1:25 PM    Originating Location (pt. Location): Home    Distant Location (provider location):  [facility]     Platform used for Video Visit: Kiio     Presenting History:     Mr. Christophe Del Rosario presents to clinic for management of sleep apnea.     Patient had sleep testing in 2016. PSG on 11/3/2016 at weight of 201 lbs showed an apnea hypopnea index of 12.6 per hour. TST was 372.5 minutes with 28.5 minutes in supine position. Supine AHI was 90.5 per hour and non-supine AHI of 6.1 per hour.     We had discussed dental appliance therapy but patient did not pursue treatment. He returns due to resistant hypertension and recommendation from Cardiology.      Patient continues to snore. He and his wife sleep separately due to his loud snoring. He denies gasping or choking episodes in sleep. He does not have knowledge of witnessed apneas. He usually sleeps on his stomach and sides and sometimes on his back. He denies morning headaches.     He goes to bed around 10-11 PM. Sleep latency is short and can be within 10 minutes. He wakes up between 4-5 AM and has difficulty returning to sleep He will read at this time until he is able to return to sleep. One he returns to sleep, he will wake up around 7 AM.     He feels un refreshed on waking up and feels tired during the day. He denies excessive sleepiness ro sleepiness when driving. [place] score is 6/24.     He denies restless leg symptoms. He denies sleep walking, sleep paralysis or hypnagogic/ hypnopompic hallucinations.     He doesnot take any naps  "except once in 2 months.       Past Medical History:   Diagnosis Date     ASCVD (arteriosclerotic cardiovascular disease)      Chest pain      HTN (hypertension) 2/27/2012     BP (!) 158/80   Ht 1.803 m (5' 11\")   Wt 87.1 kg (192 lb)   BMI 26.78 kg/m      Exam:  Constitutional: alert, active and no distress  Respiratory: negative for cough or dyspnea   Psychiatric: mentation appears normal and affect normal/bright    Impression & plan:     1. Obstructive sleep apnea  2. Hypertension       Patient's PSG in 2016 at a weight of 201 pounds showed mild obstructive sleep apnea with an apnea-hypopnea index of 12.6/h.  Supine AHI was 90.5 and nonsupine AHI was 6.1.  Patient did not pursue treatment at the time but is interested in CPAP therapy due to daytime fatigue, resistant hypertension and recommendation from cardiology.  His current weight is 192 pounds.  He continues to have sleep apnea symptoms.  His predominant sleep position is on his sides of her stomach.    Treatment options for mild obstructive sleep apnea was reviewed.  Patient hopes to start CPAP therapy.  Considering mild weight loss since his original sleep study, I would expect that his sleep apnea is still in the mild range.  He can start auto titrating CPAP therapy and monitor how he responds.    Plan:     1. Start auto PAP therapy with pressure range of 5-15 cm H2O  2. Follow up in 1-2 months after starting CPAP    I spent a total of 40 minutes for this appointment on this date of service which include time spent before, during and after the visit for chart review, patient care, counseling and coordination of care.    Dr. Enrico Dsouza         "

## 2022-03-15 ENCOUNTER — TELEPHONE (OUTPATIENT)
Dept: SLEEP MEDICINE | Facility: CLINIC | Age: 67
End: 2022-03-15
Payer: MEDICARE

## 2022-03-15 DIAGNOSIS — G47.33 OSA (OBSTRUCTIVE SLEEP APNEA): Primary | ICD-10-CM

## 2022-03-15 NOTE — TELEPHONE ENCOUNTER
Reason for Call: Request for an order or referral:    Order or referral being requested:  Sleep test     Date needed: as soon as possible    Has the patient been seen by the PCP for this problem? YES    Additional comments: Pt had consult w/ Dr. Weston on 03/11/22,  Gave prescription for new mask/cpap, however, company won't give him new equipment unless he has another sleep test,  Was aware this could happen, can pt get order for sleep test? Please call pt to advise    Phone number Patient can be reached at:  Cell number on file:    Telephone Information:   Mobile [phone]       Best Time:  ANY    Can we leave a detailed message on this number?  YES    Call taken on 3/15/2022 at 3:45 PM by Shameka Fernandez

## 2022-03-15 NOTE — TELEPHONE ENCOUNTER
Informed patient Medicare require CPAP setup to be within a year from the PSG date, which patient had the PSG in 2016. Patient understood and will follow up with provider to have repeat study.

## 2022-03-23 ENCOUNTER — ALLIED HEALTH/NURSE VISIT (OUTPATIENT)
Dept: CARDIOLOGY | Facility: CLINIC | Age: 67
End: 2022-03-23
Attending: INTERNAL MEDICINE
Payer: COMMERCIAL

## 2022-03-23 ENCOUNTER — LAB (OUTPATIENT)
Dept: LAB | Facility: CLINIC | Age: 67
End: 2022-03-23
Payer: MEDICARE

## 2022-03-23 VITALS — OXYGEN SATURATION: 96 % | SYSTOLIC BLOOD PRESSURE: 130 MMHG | DIASTOLIC BLOOD PRESSURE: 73 MMHG | HEART RATE: 63 BPM

## 2022-03-23 DIAGNOSIS — I10 ESSENTIAL HYPERTENSION: ICD-10-CM

## 2022-03-23 DIAGNOSIS — I25.10 ASCVD (ARTERIOSCLEROTIC CARDIOVASCULAR DISEASE): ICD-10-CM

## 2022-03-23 LAB
ANION GAP SERPL CALCULATED.3IONS-SCNC: 4 MMOL/L (ref 3–14)
BUN SERPL-MCNC: 40 MG/DL (ref 7–30)
CALCIUM SERPL-MCNC: 8.8 MG/DL (ref 8.5–10.1)
CHLORIDE BLD-SCNC: 107 MMOL/L (ref 94–109)
CO2 SERPL-SCNC: 27 MMOL/L (ref 20–32)
CREAT SERPL-MCNC: 1.25 MG/DL (ref 0.66–1.25)
GFR SERPL CREATININE-BSD FRML MDRD: 64 ML/MIN/1.73M2
GLUCOSE BLD-MCNC: 93 MG/DL (ref 70–99)
POTASSIUM BLD-SCNC: 3.9 MMOL/L (ref 3.4–5.3)
SODIUM SERPL-SCNC: 138 MMOL/L (ref 133–144)

## 2022-03-23 PROCEDURE — 99207 PR NO CHARGE NURSE ONLY: CPT | Performed by: INTERNAL MEDICINE

## 2022-03-23 PROCEDURE — 36415 COLL VENOUS BLD VENIPUNCTURE: CPT | Performed by: INTERNAL MEDICINE

## 2022-03-23 PROCEDURE — 80048 BASIC METABOLIC PNL TOTAL CA: CPT | Performed by: INTERNAL MEDICINE

## 2022-03-23 RX ORDER — AMLODIPINE BESYLATE 10 MG/1
10 TABLET ORAL DAILY
Qty: 90 TABLET | Refills: 3 | Status: SHIPPED | OUTPATIENT
Start: 2022-03-23 | End: 2023-03-17

## 2022-03-23 RX ORDER — AMLODIPINE BESYLATE 5 MG/1
10 TABLET ORAL DAILY
Qty: 90 TABLET | Refills: 3 | Status: CANCELLED | OUTPATIENT
Start: 2022-03-23

## 2022-03-23 NOTE — PROGRESS NOTES
I met with Christophe Del Rosario at the request of Dr. Medellin to recheck his blood pressure.  Blood pressure medications on the med list were reviewed with patient.    Patient has taken all medications as per usual regimen: Yes  Patient reports tolerating them without any issues or concerns: Yes    Vitals:    03/23/22 0923   BP: 130/73   Pulse: 63   SpO2: 96%       Blood pressure was taken, previous encounter was reviewed, recorded blood pressure below 140/90.  Patient was discharged and the note will be sent to the provider for final review.      Previous visit 2/22/22   /80 mm/Hg  85 bpm    Sandy Rolle CMA (AAMA)

## 2022-03-24 ENCOUNTER — TELEPHONE (OUTPATIENT)
Dept: CARDIOLOGY | Facility: CLINIC | Age: 67
End: 2022-03-24
Payer: MEDICARE

## 2022-03-24 DIAGNOSIS — I10 ESSENTIAL HYPERTENSION: Primary | ICD-10-CM

## 2022-03-24 NOTE — TELEPHONE ENCOUNTER
"Reviewed BMP showing   Recent Labs   Lab Test 03/23/22  0901 01/04/22  0841    138   POTASSIUM 3.9 4.5   CHLORIDE 107 105   CO2 27 28   ANIONGAP 4 5   GLC 93 108*   BUN 40* 32*   CR 1.25 1.18   WILLY 8.8 8.9     BP check 3/23/22 showed /73 Pulse 63 SpO2 96%    Per office visit dated 2/22/22, Dr. Medellin recommended, \"We will increase the dose of amlodipine to 10 mg per day and I will also add in hydrochlorothiazide 12.5 mg per day.  In 2-3 weeks' time, I will have him follow up with a basic metabolic profile and a nurse visit to check his blood pressure.\"     Will message Dr. Medellin to review. Nanci BARTON   "

## 2022-03-25 NOTE — NURSING NOTE
DME orders have been automatically faxed to [facility].  Pamela Barber, CMA

## 2022-03-25 NOTE — NURSING NOTE
Medicare will not pay for DME until sleep study is repeated. PSG and follow-up with provider scheduled. Pamela Barber, CMA

## 2022-03-29 NOTE — TELEPHONE ENCOUNTER
Much improved BP and slight rise in Cr but has had similar levels in the past. Would check BMP in 4 weeks. thx

## 2022-04-01 NOTE — TELEPHONE ENCOUNTER
Attempted to call pt with recommendations from Dr. Medellin, left message for pt to call back. Nanci BARTON

## 2022-04-05 NOTE — TELEPHONE ENCOUNTER
Pt called back, states his BP has been a little low on several occasions. He was at home lifting weight & felt dizzy, BP was 102/50 & states he was at the dentist & BP per wrist cuff was about 90 systolic. Pt advised to continue current meds & check BP daily about an hour after AM meds. Pt advised to make sure he is getting enough water to drink as well. Order for BMP in chart & will message scheduling to call pt to arrange. Nanci BARTON

## 2022-04-25 ENCOUNTER — LAB (OUTPATIENT)
Dept: LAB | Facility: CLINIC | Age: 67
End: 2022-04-25
Payer: MEDICARE

## 2022-04-25 DIAGNOSIS — I10 ESSENTIAL HYPERTENSION: ICD-10-CM

## 2022-04-25 LAB
ANION GAP SERPL CALCULATED.3IONS-SCNC: 5 MMOL/L (ref 3–14)
BUN SERPL-MCNC: 42 MG/DL (ref 7–30)
CALCIUM SERPL-MCNC: 9.1 MG/DL (ref 8.5–10.1)
CHLORIDE BLD-SCNC: 106 MMOL/L (ref 94–109)
CO2 SERPL-SCNC: 26 MMOL/L (ref 20–32)
CREAT SERPL-MCNC: 1.41 MG/DL (ref 0.66–1.25)
GFR SERPL CREATININE-BSD FRML MDRD: 55 ML/MIN/1.73M2
GLUCOSE BLD-MCNC: 98 MG/DL (ref 70–99)
POTASSIUM BLD-SCNC: 4.1 MMOL/L (ref 3.4–5.3)
SODIUM SERPL-SCNC: 137 MMOL/L (ref 133–144)

## 2022-04-25 PROCEDURE — 80048 BASIC METABOLIC PNL TOTAL CA: CPT | Performed by: INTERNAL MEDICINE

## 2022-04-25 PROCEDURE — 36415 COLL VENOUS BLD VENIPUNCTURE: CPT | Performed by: INTERNAL MEDICINE

## 2022-05-03 NOTE — TELEPHONE ENCOUNTER
Called pt with BMP showing creat at 1.41. He is traveling but will check BP after he gets home & will call in with reading. Nanci BARTON     Addendum: Pt called back, states his BP is 126/75 w/ HR 70 bpm. Will message Dr. Medellin to review. Nanci BARTON

## 2022-05-03 NOTE — TELEPHONE ENCOUNTER
Reviewed BMP showing   Recent Labs   Lab Test 04/25/22  1307 03/23/22  0901    138   POTASSIUM 4.1 3.9   CHLORIDE 106 107   CO2 26 27   ANIONGAP 5 4   GLC 98 93   BUN 42* 40*   CR 1.41* 1.25   WILLY 9.1 8.8     Current Outpatient Medications   Medication     amLODIPine (NORVASC) 10 MG tablet     aspirin 81 MG chewable tablet     atorvastatin (LIPITOR) 80 MG tablet     hydrochlorothiazide (MICROZIDE) 12.5 MG capsule     olmesartan (BENICAR) 40 MG tablet     No current facility-administered medications for this visit.      Will message Dr. Medellin to review. Nanci BARTON

## 2022-05-04 NOTE — TELEPHONE ENCOUNTER
Should maintain good hydration and should get repeat BMP in 1 week. But if cr rises further will have to stop hydrochlorothiazide and possibly use hytrin 2 mg instead. thx

## 2022-05-05 NOTE — TELEPHONE ENCOUNTER
Called pt with recommendations from Dr. Medellin. Pt c/o feeling more tired since starting the HCTZ. Also c/o some swelling in his ankles which he states is unusual for him. He did just get home yesterday from 9 [address] drive & went for a walk today & feeling more tired than usual. Pt will increase water intake & he follows a low sodium diet & will recheck BMP in one week, scheduled 5/13/22 at 8:45 AM. He denies any difficulty breathing. Nanci BARTON

## 2022-05-11 ENCOUNTER — THERAPY VISIT (OUTPATIENT)
Dept: SLEEP MEDICINE | Facility: CLINIC | Age: 67
End: 2022-05-11
Payer: MEDICARE

## 2022-05-11 DIAGNOSIS — G47.33 OSA (OBSTRUCTIVE SLEEP APNEA): Primary | ICD-10-CM

## 2022-05-11 PROCEDURE — 95810 POLYSOM 6/> YRS 4/> PARAM: CPT | Performed by: INTERNAL MEDICINE

## 2022-05-13 ENCOUNTER — LAB (OUTPATIENT)
Dept: LAB | Facility: CLINIC | Age: 67
End: 2022-05-13
Payer: MEDICARE

## 2022-05-13 DIAGNOSIS — I10 ESSENTIAL HYPERTENSION: ICD-10-CM

## 2022-05-13 LAB
ANION GAP SERPL CALCULATED.3IONS-SCNC: 7 MMOL/L (ref 3–14)
BUN SERPL-MCNC: 30 MG/DL (ref 7–30)
CALCIUM SERPL-MCNC: 8.8 MG/DL (ref 8.5–10.1)
CHLORIDE BLD-SCNC: 109 MMOL/L (ref 94–109)
CO2 SERPL-SCNC: 23 MMOL/L (ref 20–32)
CREAT SERPL-MCNC: 1.07 MG/DL (ref 0.66–1.25)
GFR SERPL CREATININE-BSD FRML MDRD: 77 ML/MIN/1.73M2
GLUCOSE BLD-MCNC: 98 MG/DL (ref 70–99)
POTASSIUM BLD-SCNC: 4.1 MMOL/L (ref 3.4–5.3)
SODIUM SERPL-SCNC: 139 MMOL/L (ref 133–144)

## 2022-05-13 PROCEDURE — 80048 BASIC METABOLIC PNL TOTAL CA: CPT

## 2022-05-13 PROCEDURE — 36415 COLL VENOUS BLD VENIPUNCTURE: CPT

## 2022-05-13 NOTE — TELEPHONE ENCOUNTER
"Reviewed BMP showing   Recent Labs   Lab Test 05/13/22  0843 04/25/22  1307    137   POTASSIUM 4.1 4.1   CHLORIDE 109 106   CO2 23 26   ANIONGAP 7 5   GLC 98 98   BUN 30 42*   CR 1.07 1.41*   WILLY 8.8 9.1     Called pt with results, states since the last check, he stopped the HCTZ as he was feeling \"lousy\" on it w/ fatigue & ankle swelling. He states he has been feeling much better since stopping it & states BP has been running 120-130/70-80s. Pt was not able to check BP at this moment but will call back with BP reading. Will message Dr. Medellin to review. Nanci BARTON   "

## 2022-05-15 NOTE — PROCEDURES
" SLEEP STUDY INTERPRETATION  DIAGNOSTIC POLYSOMNOGRAPHY REPORT      Patient: CHRYSTAL JACKSON  YOB: 1955  Study Date: 5/11/2022  MRN: [id number]  Referring Provider: Larry Sahni  Ordering Provider: Meet Weston    Indications for Polysomnography: The patient is a 66 year old Male who is 5' 11\" and weighs 192.0 lbs. His BMI is 26.9, Anthony sleepiness scale 06 and neck circumference is 44 cm. A diagnostic polysomnogram was performed to evaluate for sleep apnea.    Polysomnogram Data: A full night polysomnogram recorded the standard physiologic parameters including EEG, EOG, EMG, ECG, nasal and oral airflow. Respiratory parameters of chest and abdominal movements were recorded with respiratory inductance plethysmography. Oxygen saturation was recorded by pulse oximetry. Hypopnea scoring rule used: 1B 4%.    Sleep Architecture: Fragmented sleep.   The total recording time of the polysomnogram was 502.1 minutes. The total sleep time was 383.0 minutes. Sleep latency was increased at 34.0 minutes without the use of a sleep aid. REM latency was 113.0 minutes. Arousal index was increased at 38.2 arousals per hour. Sleep efficiency was decreased at 76.3%. Wake after sleep onset was 85.0 minutes. The patient spent 12.5% of total sleep time in Stage N1, 45.2% in Stage N2, 16.1% in Stage N3, and 26.2% in REM. Time in REM supine was - minutes.    Respiration: Moderate obstructive sleep apnea.     Events ? The polysomnogram revealed a presence of 47 obstructive, 18 central, and 14 mixed apneas resulting in an apnea index of 12.4 events per hour. There were 21 obstructive hypopneas and - central hypopneas resulting in an obstructive hypopnea index of 3.3 and central hypopnea index of - events per hour. The combined apnea/hypopnea index was 15.7 events per hour (central apnea/hypopnea index was 2.8 events per hour). The REM AHI was - events per hour. The supine AHI was 96.3 events per hour. The RERA index was " 1.9 events per hour.  The RDI was 17.5 events per hour.    Snoring - was reported as mild.    Respiratory rate and pattern - was notable for normal respiratory rate and pattern.    Sustained Sleep Associated Hypoventilation - Transcutaneous carbon dioxide monitoring was not used; however significant hypoventilation was not suggested by oximetry.    Sleep Associated Hypoxemia - (Greater than 5 minutes O2 sat at or below 88%) was not present. Baseline oxygen saturation was 92.8%. Lowest oxygen saturation was 85.0%. Time spent less than or equal to 88% was 2.3 minutes. Time spent less than or equal to 89% was 5.8 minutes.    Movement Activity: Mildly increased periodic limb movements of sleep and associated arousals.     Periodic Limb Activity - There were 172 PLMs during the entire study. The PLM index was 26.9 movements per hour. The PLM Arousal Index was 11.4 per hour.    REM EMG Activity - Excessive transient/sustained muscle activity was not present.    Nocturnal Behavior - Abnormal sleep related behaviors were not noted during/arising out of NREM / REM sleep.     Bruxism - None apparent.    Cardiac Summary: Sinus rhythm.   The average pulse rate was 60.6 bpm. The minimum pulse rate was 52.0 bpm while the maximum pulse rate was 96.0 bpm.  Arrhythmias were not noted.    Assessment:     Moderate obstructive sleep apnea with associated sleep fragmentation and oxygen desaturations. There was a minor proportion of sleep transitional central apneas without long cycle lengths.     Recommendations:    Depending on clinical indications for treatment of moderate sleep apnea, consider following options.     Treatment could be empirically initiated with Auto?titrating PAP therapy with a range of 5 to 15 cmH2O. Recommend clinical follow up with sleep management team.    Patient may be a candidate for dental appliance through referral to Sleep Dentistry for the treatment of obstructive sleep apnea.    If devices are not  acceptable or effective, patient may benefit from evaluation of possible surgical options. If he is interested, would recommend referral to specialized ENT-Sleep provider.    Suggest optimizing sleep schedule and avoiding sleep deprivation.    Pharmacologic therapy should be used for management of restless legs syndrome only if present and clinically indicated and not based on the presence of periodic limb movements alone.    Diagnostic Codes:   Obstructive Sleep Apnea G47.33  Repetitive Intrusions Into Sleep F51.8    5/11/2022 [facility] Diagnostic Sleep Study (192.0 lbs) - AHI 15.7, RDI 17.5, Supine AHI 96.3, REM AHI -, Low O2 85.0%, Time Spent ?88% 2.3 minutes / Time Spent ?89% 5.8 minutes.     _____________________________________   Electronically Signed By: Enrico Dsouza MD 05/15/2022

## 2022-05-16 LAB — SLPCOMP: NORMAL

## 2022-05-16 NOTE — TELEPHONE ENCOUNTER
Stay off hydrochlorothiazide and need to get an assessment of how his BP is doing off it. Significantly improved BMP off hydrochlorothiazide. Thx

## 2022-05-17 NOTE — TELEPHONE ENCOUNTER
Called pt with recommendations from Dr. Medellin. Pt stats BP yesterday was 127/73. Pt advised to continue to monitor his BP twice daily starting 1-2 hours after his morning meds & again in the evening & to keep a log of BP readings & let me know his readings are in 1-2 weeks. Pt verbalized understanding. Nanci BARTON

## 2022-05-25 ENCOUNTER — TELEPHONE (OUTPATIENT)
Dept: SLEEP MEDICINE | Facility: CLINIC | Age: 67
End: 2022-05-25
Payer: MEDICARE

## 2022-05-25 NOTE — TELEPHONE ENCOUNTER
I called patient to let him know [facility] received documentation for new CPAP setup, and to discuss waitlist for machines. I let him know somone would give him a call once a machine is available, and that they will leave a voicemail if they are unable to speak to him directly. He understands that if a voicemail is left, he will have 24 hours to call [facility] back.

## 2022-06-01 DIAGNOSIS — I25.10 CORONARY ARTERY DISEASE INVOLVING NATIVE CORONARY ARTERY OF NATIVE HEART WITHOUT ANGINA PECTORIS: ICD-10-CM

## 2022-06-01 RX ORDER — ATORVASTATIN CALCIUM 80 MG/1
80 TABLET, FILM COATED ORAL DAILY
Qty: 90 TABLET | Refills: 2 | Status: SHIPPED | OUTPATIENT
Start: 2022-06-01 | End: 2022-12-05

## 2022-06-16 ENCOUNTER — VIRTUAL VISIT (OUTPATIENT)
Dept: SLEEP MEDICINE | Facility: CLINIC | Age: 67
End: 2022-06-16
Payer: MEDICARE

## 2022-06-16 VITALS — HEIGHT: 71 IN | WEIGHT: 193 LBS | BODY MASS INDEX: 27.02 KG/M2

## 2022-06-16 DIAGNOSIS — G47.33 OSA (OBSTRUCTIVE SLEEP APNEA): Primary | ICD-10-CM

## 2022-06-16 PROCEDURE — 99213 OFFICE O/P EST LOW 20 MIN: CPT | Mod: 95 | Performed by: INTERNAL MEDICINE

## 2022-06-16 ASSESSMENT — SLEEP AND FATIGUE QUESTIONNAIRES
HOW LIKELY ARE YOU TO NOD OFF OR FALL ASLEEP WHILE SITTING INACTIVE IN A PUBLIC PLACE: WOULD NEVER DOZE
HOW LIKELY ARE YOU TO NOD OFF OR FALL ASLEEP WHILE SITTING AND READING: MODERATE CHANCE OF DOZING
HOW LIKELY ARE YOU TO NOD OFF OR FALL ASLEEP WHILE SITTING QUIETLY AFTER LUNCH WITHOUT ALCOHOL: WOULD NEVER DOZE
HOW LIKELY ARE YOU TO NOD OFF OR FALL ASLEEP WHILE SITTING AND TALKING TO SOMEONE: WOULD NEVER DOZE
HOW LIKELY ARE YOU TO NOD OFF OR FALL ASLEEP WHEN YOU ARE A PASSENGER IN A CAR FOR AN HOUR WITHOUT A BREAK: MODERATE CHANCE OF DOZING
HOW LIKELY ARE YOU TO NOD OFF OR FALL ASLEEP IN A CAR, WHILE STOPPED FOR A FEW MINUTES IN TRAFFIC: WOULD NEVER DOZE
HOW LIKELY ARE YOU TO NOD OFF OR FALL ASLEEP WHILE LYING DOWN TO REST IN THE AFTERNOON WHEN CIRCUMSTANCES PERMIT: MODERATE CHANCE OF DOZING
HOW LIKELY ARE YOU TO NOD OFF OR FALL ASLEEP WHILE WATCHING TV: MODERATE CHANCE OF DOZING

## 2022-06-16 ASSESSMENT — PAIN SCALES - GENERAL: PAINLEVEL: NO PAIN (0)

## 2022-06-16 NOTE — PROGRESS NOTES
Jag is a 66 year old who is being evaluated via a billable video visit.      How would you like to obtain your AVS? MyChart  If the video visit is dropped, the invitation should be resent by: Text to cell phone: [phone]  Will anyone else be joining your video visit? Dali  Amos Sahni      Video-Visit Details    Video Start Time: 1:00 PM    Type of service:  Video Visit    Video End Time:1:08 PM    Originating Location (pt. Location): Home    Distant Location (provider location):  [facility]     Platform used for Video Visit: [facility]  Sleep Study Follow-Up Visit:    Date on this visit: 6/16/2022    Christophe Del Rosario comes in today for follow-up of his sleep study done on 5/11/22 at the [facility] for possible sleep apnea.    Sleep latency 34 minutes without Ambien.  REM achieved.   REM latency 113 minutes.  Sleep efficiency 76.3%. Total sleep time 383 minutes.    Sleep architecture:  Stage 1, 12.5% (5%), stage 2, 45.2% (45-55%), stage 3, 16% (15-20%), stage REM, 26.2% (20-25%).  AHI was 15.7, with desaturations. RDI 17.5.  REM AHI -, consistent with no REM BARTOLO.  Supine AHI 96.3, consistent with severe SUPINE BARTOLO.  Periodic Limb Movement Index 26.9/hour.       These findings were reviewed with patient.     Past medical/surgical history, family history, social history, medications and allergies were reviewed.      Problem List:  Patient Active Problem List    Diagnosis Date Noted     Meningioma (H) 06/05/2020     Priority: Medium     C1 cervical fracture (H) 05/30/2020     Priority: Medium     ASCVD (arteriosclerotic cardiovascular disease)      Priority: Medium     Alcohol dependence (H) 01/12/2018     Priority: Medium     Down to 15-20 per week from 25-30       Chest pain      Priority: Medium     BARTOLO (obstructive sleep apnea) 11/09/2016     Priority: Medium     Mild BARTOLO       Essential hypertension 10/14/2016     Priority: Medium     Health Care Home 08/27/2013      Priority: Medium     Advance Care Planning 08/27/2013     Priority: Medium     Advance Care Planning 10/16/2015: ACP Review and Resources Provided:  Reviewed chart for advance care plan.  Christophe Del Rosario has a full code status from 8-27-13 but no document on file. Recommend resources be provided at next opportunity. Confirmed/documented legally designated decision maker(s). Added by Yessy Cuellar RN, System ACP Coordinator Honoring Choices                 Impression/Plan:    1. Obstructive sleep apnea  2. Hypertension     Sleep study result was reviewed in detail.  We discussed finding of moderate obstructive sleep apnea and positional dependence of sleep apnea episodes.  Supine AHI was 96/h compared to a nonsupine AHI of 4/h.  Therapy options for his sleep apnea were reviewed.  Due to resistant hypertension and symptomatic burden from his sleep apnea, patient opts for CPAP therapy.  He is waiting for a device to be available from the DME, due to current shortage of PAP devices.  In the meantime, I recommended that he start positional therapy to avoid supine sleep.  Options for positional devices was provided.    Plan:     1. Start auto PAP therapy 5-15 cm H2O     I spent a total of 20 minutes for this appointment on this date of service which include time spent before, during and after the visit for chart review, patient care, counseling and coordination of care.      Dr. Enrico Dsouza     CC: Jorge L Mullen

## 2022-06-16 NOTE — PATIENT INSTRUCTIONS
- Start CPAP therapy when a device becomes available. Follow up with me in 1-2 months after starting CPAP.   - In the meantime try positional therapy to avoid sleeping on your back     Examples of positional devices for avoiding sleeping on your back include the following. If interested, you can get these directly from these companies.     Triviala, https://www.VivaSmart, [email or web address]  SleepNoodle, [email or web address]

## 2022-07-07 NOTE — NURSING NOTE
DME orders have been automatically faxed to [facility].  My Chart message will be sent to patient:  Christophe Del Rosario  [address] [address]       2022      Dear Mr. Del Rosario,      Your provider has placed an order for you to get a new PAP device. Your medical equipment company will try to contact you as soon as possible to schedule your set up (Please be advised, due to a shortage of machines, this may take longer to receive call). Once you know the date of this appointment, please contact our office to schedule a follow up visit with your provider. This appointment should be scheduled 30-60 days from the day that you will be set up on your new device.     [facility] contact information:     [facility]: [phone]  [place]: [phone]  [place]: [phone]  [phone]  [place]: [phone]  [place]: [phone]        [facility]   Schedule line: [phone]      Sincerely,          Pamela Barber, [facility]  Sleep Medicine

## 2022-07-18 ENCOUNTER — HOSPITAL ENCOUNTER (OUTPATIENT)
Dept: MRI IMAGING | Facility: CLINIC | Age: 67
Discharge: HOME | End: 2022-07-18
Attending: NEUROLOGICAL SURGERY | Admitting: NEUROLOGICAL SURGERY
Payer: MEDICARE

## 2022-07-18 DIAGNOSIS — D32.0 BENIGN NEOPLASM OF CEREBRAL MENINGES (H): ICD-10-CM

## 2022-07-18 PROCEDURE — 255N000002 HC RX 255 OP 636: Performed by: NEUROLOGICAL SURGERY

## 2022-07-18 PROCEDURE — A9585 GADOBUTROL INJECTION: HCPCS | Performed by: NEUROLOGICAL SURGERY

## 2022-07-18 PROCEDURE — 70553 MRI BRAIN STEM W/O & W/DYE: CPT | Mod: ME

## 2022-07-18 RX ORDER — GADOBUTROL 604.72 MG/ML
9 INJECTION INTRAVENOUS ONCE
Status: COMPLETED | OUTPATIENT
Start: 2022-07-18 | End: 2022-07-18

## 2022-07-18 RX ADMIN — GADOBUTROL 9 ML: 604.72 INJECTION INTRAVENOUS at 14:59

## 2022-07-22 ENCOUNTER — OFFICE VISIT (OUTPATIENT)
Dept: NEUROSURGERY | Facility: CLINIC | Age: 67
End: 2022-07-22
Payer: MEDICARE

## 2022-07-22 VITALS
DIASTOLIC BLOOD PRESSURE: 70 MMHG | HEART RATE: 74 BPM | BODY MASS INDEX: 27.28 KG/M2 | OXYGEN SATURATION: 96 % | SYSTOLIC BLOOD PRESSURE: 116 MMHG | WEIGHT: 195.6 LBS

## 2022-07-22 DIAGNOSIS — D32.0 BENIGN NEOPLASM OF CEREBRAL MENINGES (H): Primary | ICD-10-CM

## 2022-07-22 PROCEDURE — 99213 OFFICE O/P EST LOW 20 MIN: CPT | Performed by: NEUROLOGICAL SURGERY

## 2022-07-22 ASSESSMENT — PAIN SCALES - GENERAL: PAINLEVEL: NO PAIN (0)

## 2022-07-22 NOTE — LETTER
"    7/22/2022         RE: Christophe Del Rosario  [address] [address]        Dear Colleague,    Thank you for referring your patient, Christophe Del Rosario, to the [facility]. Please see a copy of my visit note below.    It was a pleasure to see Christophe Del Rosario today in Neurosurgery Clinic. 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    He denies any new symptoms.    Vitals:    07/22/22 1348   BP: 116/70   Pulse: 74   SpO2: 96%   Weight: 88.7 kg (195 lb 9.6 oz)     Estimated body mass index is 27.28 kg/m  as calculated from the following:    Height as of 6/16/22: 1.803 m (5' 11\").    Weight as of this encounter: 88.7 kg (195 lb 9.6 oz).  No Pain (0)      Awake alert.  Neurologically intact.    Imaging: MRI of the brain from the 2020, 2021, 2022 were reviewed.  These show the olfactory groove meningioma that really does not appear to have changed in size at all over that timeframe.  The imaging was reviewed with the patient showed the patient clinic today.    Assessment: Olfactory groove meningioma, stable.    Plan: I think it would be reasonable to repeat his imaging in 2 to 3 years.  The patient will contact us in that timeframe to schedule a follow-up appointment with the brain MRI with and without contrast.         Again, thank you for allowing me to participate in the care of your patient.        Sincerely,        Adam Baker MD    "

## 2022-07-22 NOTE — PROGRESS NOTES
"It was a pleasure to see Christophe Del Rosario today in Neurosurgery Clinic. 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    He denies any new symptoms.    Vitals:    07/22/22 1348   BP: 116/70   Pulse: 74   SpO2: 96%   Weight: 88.7 kg (195 lb 9.6 oz)     Estimated body mass index is 27.28 kg/m  as calculated from the following:    Height as of 6/16/22: 1.803 m (5' 11\").    Weight as of this encounter: 88.7 kg (195 lb 9.6 oz).  No Pain (0)      Awake alert.  Neurologically intact.    Imaging: MRI of the brain from the 2020, 2021, 2022 were reviewed.  These show the olfactory groove meningioma that really does not appear to have changed in size at all over that timeframe.  The imaging was reviewed with the patient showed the patient clinic today.    Assessment: Olfactory groove meningioma, stable.    Plan: I think it would be reasonable to repeat his imaging in 2 to 3 years.  The patient will contact us in that timeframe to schedule a follow-up appointment with the brain MRI with and without contrast.     "

## 2022-08-01 ENCOUNTER — LAB (OUTPATIENT)
Dept: LAB | Facility: CLINIC | Age: 67
End: 2022-08-01
Payer: MEDICARE

## 2022-08-01 DIAGNOSIS — I10 ESSENTIAL HYPERTENSION: ICD-10-CM

## 2022-08-01 LAB
ANION GAP SERPL CALCULATED.3IONS-SCNC: 6 MMOL/L (ref 3–14)
BUN SERPL-MCNC: 25 MG/DL (ref 7–30)
CALCIUM SERPL-MCNC: 9.1 MG/DL (ref 8.5–10.1)
CHLORIDE BLD-SCNC: 108 MMOL/L (ref 94–109)
CO2 SERPL-SCNC: 24 MMOL/L (ref 20–32)
CREAT SERPL-MCNC: 1.03 MG/DL (ref 0.66–1.25)
GFR SERPL CREATININE-BSD FRML MDRD: 80 ML/MIN/1.73M2
GLUCOSE BLD-MCNC: 107 MG/DL (ref 70–99)
POTASSIUM BLD-SCNC: 4.1 MMOL/L (ref 3.4–5.3)
SODIUM SERPL-SCNC: 138 MMOL/L (ref 133–144)

## 2022-08-01 PROCEDURE — 36415 COLL VENOUS BLD VENIPUNCTURE: CPT | Performed by: INTERNAL MEDICINE

## 2022-08-01 PROCEDURE — 80048 BASIC METABOLIC PNL TOTAL CA: CPT | Performed by: INTERNAL MEDICINE

## 2022-08-04 ENCOUNTER — DOCUMENTATION ONLY (OUTPATIENT)
Dept: SLEEP MEDICINE | Facility: CLINIC | Age: 67
End: 2022-08-04

## 2022-08-04 DIAGNOSIS — G47.33 OSA (OBSTRUCTIVE SLEEP APNEA): Primary | ICD-10-CM

## 2022-08-04 NOTE — PROGRESS NOTES
Patient was offered choice of vendor and chose [facility].  Patient Christophe Del Rosario was set up at [facility]  on August 4, 2022. Patient received a Resmed Airsense 11 Pressures were set at 5-15 cm H2O.   Patient s ramp is 5 cm H2O for Auto and FLEX/EPR is EPR, 2.  Patient received a Resmed Mask name: Airfit F30i Full Face mask size Medium, heated tubing and heated humidifier.  Patient does need to meet compliance. Patient has a follow up on 10/18/2022 with Dr. Dsouza.    Sherry Britt

## 2022-08-08 ENCOUNTER — DOCUMENTATION ONLY (OUTPATIENT)
Dept: SLEEP MEDICINE | Facility: CLINIC | Age: 67
End: 2022-08-08

## 2022-08-08 NOTE — PROGRESS NOTES
3 day Sleep therapy management telephone visit    Diagnostic AHI: 15.7 PSG    Confirmed with patient at time of call- N/A Patient is still interested in STM service       Message left for patient to return call.        Objective data     Order Settings for PAP  CPAP min 5    CPAP max 15             Device settings from machine CPAP min 5.0     CPAP max 15.0           EPR Setting TWO    RESMED soft response  OFF     Assessment: Minimal usage      Action plan: Patient to have 14 day STM visit. Patient has a follow up visit scheduled:   yes within 31-90 days of set up    Replacement device: No  STM ordered by provider: Yes     Total time spent on accessing and  interpreting remote patient PAP therapy data  10 minutes    Total time spent counseling, coaching  and reviewing PAP therapy data with patient  1 minutes    [id number] no

## 2022-08-15 ENCOUNTER — DOCUMENTATION ONLY (OUTPATIENT)
Dept: SLEEP MEDICINE | Facility: CLINIC | Age: 67
End: 2022-08-15

## 2022-08-16 ENCOUNTER — DOCUMENTATION ONLY (OUTPATIENT)
Dept: SLEEP MEDICINE | Facility: CLINIC | Age: 67
End: 2022-08-16

## 2022-08-16 DIAGNOSIS — G47.33 OSA (OBSTRUCTIVE SLEEP APNEA): Primary | ICD-10-CM

## 2022-08-16 NOTE — Clinical Note
Hi request pressure change 5-11 cm H20 in Resmed.  Patient has minimal use and can't tolerate the high pressures.    Thanks Chip

## 2022-08-16 NOTE — PROGRESS NOTES
STM Recheck:  Patient called he stated he can't tolerate the high CPAP pressure.  He stated the pressure get's so strong that it cause his mask to leak waking him up.  Changed Standard response to soft and will put in for a pressure change 5-11 cm H20.

## 2022-08-19 ENCOUNTER — DOCUMENTATION ONLY (OUTPATIENT)
Dept: SLEEP MEDICINE | Facility: CLINIC | Age: 67
End: 2022-08-19

## 2022-08-19 NOTE — PROGRESS NOTES
14  DAY STM VISIT    Diagnostic AHI: 15.7  PSG    Subjective measures:   Patient had pressure changed on CPAP machine a few days ago but has not been able to use CPAP as he is on a fishing trip and did not bring it with. He will be back after next week and will receive a 30 day stm call to check how things are going.    Assessment: Pt not meeting objective benchmarks for leak and compliance  Patient failing following subjective benchmarks: pressure issues    Action plan: pt to have 30 day STM visit.      Device type: Auto-CPAP    PAP settings:  CPAP MIN CPAP MAX 95TH % PRESSURE EPR RESMED SOFT RESPONSE SETTING   5.0 cm  H20 15.0 cm  H20 10.1 cm  H20  TWO OFF     Mask type:  Full Face Mask    Objective measures: 14 day rolling measures   COMPLIANCE LEAK AHI AVERAGE USE IN MINUTES   0 % 78 6.45 38   GOAL >70% GOAL < 24 LPM GOAL <5 GOAL >240          Total time spent on accessing and interpreting remote patient PAP therapy data  10 minutes    Total time spent counseling, coaching  and reviewing PAP therapy data with patient  2 minutes    [id number]  [id number]  no (3 day STM)

## 2022-09-08 ENCOUNTER — DOCUMENTATION ONLY (OUTPATIENT)
Dept: SLEEP MEDICINE | Facility: CLINIC | Age: 67
End: 2022-09-08

## 2022-09-08 NOTE — PROGRESS NOTES
30 DAY STM VISIT    Diagnostic AHI: 15.7  PSG    PAP settings:  CPAP MIN CPAP MAX 95TH % PRESSURE EPR RESMED SOFT RESPONSE SETTING   5.0 cm  H20 15.0 cm  H20 8.7 cm  H20  TWO OFF     Device type: Auto-CPAP  Mask type:  Full Face Mask    Objective measures: 14 day rolling measures:    COMPLIANCE LEAK AHI AVERAGE USE IN MINUTES   0 % 41.6 5.87 19   GOAL >70% GOAL < 24 LPM GOAL <5 GOAL >240        Assessment: Pt not meeting objective benchmarks for AHI, leak and compliance     Message left for patient to return call   Action plan: waiting for patient to return call.  and pt to have 6 month [facility] visit  Patient has scheduled a follow up visit with Dr. Dsouza on 10/18/2022.     Total time spent on accessing and interpreting remote patient PAP therapy data  10 minutes    Total time spent counseling, coaching  and reviewing PAP therapy data with patient  1 minutes     [id number] this call  [id number] no  at 3 or 14 day [facility]

## 2022-09-28 ENCOUNTER — OFFICE VISIT (OUTPATIENT)
Dept: FAMILY MEDICINE | Facility: CLINIC | Age: 67
End: 2022-09-28

## 2022-09-28 VITALS
TEMPERATURE: 97.8 F | OXYGEN SATURATION: 97 % | HEART RATE: 71 BPM | SYSTOLIC BLOOD PRESSURE: 127 MMHG | WEIGHT: 194 LBS | DIASTOLIC BLOOD PRESSURE: 77 MMHG | BODY MASS INDEX: 27.16 KG/M2 | HEIGHT: 71 IN | RESPIRATION RATE: 16 BRPM

## 2022-09-28 DIAGNOSIS — R19.7 DIARRHEA, UNSPECIFIED TYPE: Primary | ICD-10-CM

## 2022-09-28 DIAGNOSIS — Z23 ENCOUNTER FOR IMMUNIZATION: ICD-10-CM

## 2022-09-28 LAB
% GRANULOCYTES: 70.3 % (ref 42.2–75.2)
HCT VFR BLD AUTO: 39.1 % (ref 39–51)
HEMOGLOBIN: 13.8 G/DL (ref 13.4–17.5)
LYMPHOCYTES NFR BLD AUTO: 22.4 % (ref 20.5–51.1)
MCH RBC QN AUTO: 33.6 PG (ref 27–31)
MCHC RBC AUTO-ENTMCNC: 35.2 G/DL (ref 33–37)
MCV RBC AUTO: 95.4 FL (ref 80–100)
MONOCYTES NFR BLD AUTO: 7.3 % (ref 1.7–9.3)
PLATELET # BLD AUTO: 285 K/UL (ref 140–450)
RBC # BLD AUTO: 4.09 X10/CMM (ref 4.2–5.9)
WBC # BLD AUTO: 6.9 X10/CMM (ref 3.8–11)

## 2022-09-28 PROCEDURE — 36415 COLL VENOUS BLD VENIPUNCTURE: CPT | Performed by: FAMILY MEDICINE

## 2022-09-28 PROCEDURE — 90694 VACC AIIV4 NO PRSRV 0.5ML IM: CPT | Performed by: FAMILY MEDICINE

## 2022-09-28 PROCEDURE — G0008 ADMIN INFLUENZA VIRUS VAC: HCPCS | Mod: 59 | Performed by: FAMILY MEDICINE

## 2022-09-28 PROCEDURE — 99214 OFFICE O/P EST MOD 30 MIN: CPT | Mod: 25 | Performed by: FAMILY MEDICINE

## 2022-09-28 PROCEDURE — 85025 COMPLETE CBC W/AUTO DIFF WBC: CPT | Performed by: FAMILY MEDICINE

## 2022-09-28 NOTE — PROGRESS NOTES
"  Delicia Saeed is a 66 year old patient who presents to clinic for evaluation.  6 days ago developed fever, chills, headache and diarrhea.  [place] was in [place] at the time.  One of the other people on the trip had diarrhea the whole time.  They were only drinking bottled water.  No other recent travel.  No abx use recently.  He had negative covid test last Thursday and Friday and a negative PCR on Sunday.  He is improving but diarrhea persists.  He worries about giardia or other infectious cause.  No focal abd pain.  No hematochezia.        Review of Systems   Constitutional, HEENT, cardiovascular, pulmonary, GI, , musculoskeletal, neuro, skin, endocrine and psych systems are negative, except as otherwise noted.      Objective    /77   Pulse 71   Temp 97.8  F (36.6  C) (Temporal)   Resp 16   Ht 1.803 m (5' 11\")   Wt 88 kg (194 lb)   SpO2 97%   BMI 27.06 kg/m      General: Well appearing, NAD  Abd: soft, nontender, nondistended  Psych: normal mood and affect        Results for orders placed or performed in visit on 09/28/22 (from the past 24 hour(s))   CBC with Diff/Plt ([facility])   Result Value Ref Range    WBC x10/cmm 6.9 3.8 - 11.0 x10/cmm    % Lymphocytes 22.4 20.5 - 51.1 %    % Monocytes 7.3 1.7 - 9.3 %    % Granulocytes 70.3 42.2 - 75.2 %    RBC x10/cmm 4.09 (A) 4.2 - 5.9 x10/cmm    Hemoglobin 13.8 13.4 - 17.5 g/dl    Hematocrit 39.1 39 - 51 %    MCV 95.4 80 - 100 fL    MCH 33.6 (A) 27.0 - 31.0 pg    MCHC 35.2 33.0 - 37.0 g/dL    Platelet Count 285 140 - 450 K/uL       Diarrhea, unspecified type  Reassuring CBC.  Discussed likely viral etiology.  Recommended supportive cares and follow up next week if not improved.  He prefers to be more certain and stool testing has been ordered.  Will follow up once results are available.  - GI Profile Stool PCR ([facility]); Future  - CBC with Diff/Plt ([facility])  - VENOUS COLLECTION    Encounter for immunization  - FLUAD QUADRIVALENT 65+ ([facility] ONLY)    Patient " is agreement with the assessment and plan as outlined above.  All questions answered.  Red flag symptoms that should prompt emergent evaluation discussed and understood.

## 2022-09-29 ENCOUNTER — TELEPHONE (OUTPATIENT)
Dept: SLEEP MEDICINE | Facility: CLINIC | Age: 67
End: 2022-09-29

## 2022-09-29 DIAGNOSIS — R19.7 DIARRHEA, UNSPECIFIED TYPE: ICD-10-CM

## 2022-09-29 NOTE — TELEPHONE ENCOUNTER
I called patient to check in on compliance for his CPAP machine, he is at 0% compliance as of today. He said he was initially having trouble just getting used to using the machine, but then the past 2/3 weeks has been sick with some kind of stomach/respiratory bug. He has been feeling better and will try to use machine more while just sitting up awake. Understands he has until 11/4/2022 to meet compliance.

## 2022-10-01 LAB
ADENOVIRUS F40 41 PCR: NOT DETECTED
ASTROVIRUS PCR: NOT DETECTED
C DIFFICILE TOXIN A B PCR: NOT DETECTED
CAMPYLOBACTER PCR: NOT DETECTED
CRYPTOSPORIDIUM PCR: NOT DETECTED
CYCLOSPORA CAYETANENSIS PCR: NOT DETECTED
E COLI O157 PCR: ABNORMAL
ENTAMOEBA HISTOLYTICA PCR: NOT DETECTED
ENTEROAGGREGATIVE E COLI PCR: NOT DETECTED
ENTEROPATHOGENIC E COLI PCR: NOT DETECTED
ENTEROTOXIGENIC E COLI PCR: NOT DETECTED
GIARDIA LAMBLIA PCR: NOT DETECTED
NOROVIRUS G I II PCR: NOT DETECTED
PLESIOMONAS SHIGELLOIDES PCR: NOT DETECTED
RVA RNA STL QL NAA+NON-PROBE: NOT DETECTED
SALMONELLA PCR: DETECTED
SAPOVIRUS PCR: NOT DETECTED
SHIGA TOXIN PRODUCING E COLI PCR: NOT DETECTED
SHIGELLA ENTEROINVASIVE E COLI PCR: NOT DETECTED
VIBRIO CHOLERAE PCR: NOT DETECTED
VIBRIO PCR: NOT DETECTED
YERSINIA ENTEROCOLITICA PCR: NOT DETECTED

## 2022-10-03 ENCOUNTER — TELEPHONE (OUTPATIENT)
Dept: FAMILY MEDICINE | Facility: CLINIC | Age: 67
End: 2022-10-03

## 2022-10-03 NOTE — TELEPHONE ENCOUNTER
----- Message from Jj Munguia MD sent at 10/3/2022  5:21 PM CDT -----  Please let Jag know stool test came back positive for salmonella, a type of bacteria that can cause diarrhea.  Ideally, we should avoid antibiotics. If he is improving it would be best to allow his intestines to heal on its own.  If not improving can treat with cipro 500 BID x 7 days.    Jj Munguia MD

## 2022-10-03 NOTE — TELEPHONE ENCOUNTER
Called patient with results. He had already seen them on mychart and researched a bit. He agrees with plan to avoid antibiotics and reports diarrhea is improving significantly. Appetite is good. Feeling much better. He will call if symptoms fail to resolve or if new symptoms or concerns  Lili Paulino RN

## 2022-10-18 ENCOUNTER — OFFICE VISIT (OUTPATIENT)
Dept: SLEEP MEDICINE | Facility: CLINIC | Age: 67
End: 2022-10-18
Payer: MEDICARE

## 2022-10-18 VITALS
HEIGHT: 71 IN | SYSTOLIC BLOOD PRESSURE: 130 MMHG | HEART RATE: 77 BPM | DIASTOLIC BLOOD PRESSURE: 79 MMHG | BODY MASS INDEX: 27.58 KG/M2 | OXYGEN SATURATION: 98 % | WEIGHT: 197 LBS

## 2022-10-18 DIAGNOSIS — G47.33 OSA (OBSTRUCTIVE SLEEP APNEA): Primary | ICD-10-CM

## 2022-10-18 PROCEDURE — 99214 OFFICE O/P EST MOD 30 MIN: CPT | Performed by: INTERNAL MEDICINE

## 2022-10-18 NOTE — PROGRESS NOTES
Obstructive Sleep Apnea - PAP Follow-Up Visit:    Chief Complaint   Patient presents with     CPAP Follow Up     Christophe Del Rosario comes in today for follow-up of their moderate sleep apnea, managed with CPAP.     5/11/2022 [facility] Sleep Study (192.0 lbs) - AHI 15.7, RDI 17.5, Supine AHI 96.3, REM AHI -, Low O2 85.0%, Time Spent ?88% 2.3 minutes / Time Spent ?89% 5.8 minutes.    EPWORTH SLEEPINESS SCALE      [place] Sleepiness Scale ( NIDHI Herrera  1990-1997[facility] [place]/English - Final version - 21 Nov 07 - [facility] [place].) 6/16/2022   Sitting and reading Moderate chance of dozing   Watching TV Moderate chance of dozing   Sitting, inactive in a public place (e.g. a theatre or a meeting) Would never doze   As a passenger in a car for an hour without a break Moderate chance of dozing   Lying down to rest in the afternoon when circumstances permit Moderate chance of dozing   Sitting and talking to someone Would never doze   Sitting quietly after a lunch without alcohol Would never doze   In a car, while stopped for a few minutes in traffic Would never doze   [place] Score (MC) 8   [place] Score (Sleep) 8       INSOMNIA SEVERITY INDEX (INGE)      Insomnia Severity Index (INGE) 6/16/2022   Difficulty falling asleep 1   Difficulty staying asleep 3   Problems waking up too early 2   How SATISFIED/DISSATISFIED are you with your CURRENT sleep pattern? 4   How NOTICEABLE to others do you think your sleep problem is in terms of impairing the quality of your life? 4   How WORRIED/DISTRESSED are you about your current sleep problem? 4   To what extent do you consider your sleep problem to INTERFERE with your daily functioning (e.g. daytime fatigue, mood, ability to function at work/daily chores, concentration, memory, mood, etc.) CURRENTLY? 2   INGE Total Score 20       Guidelines for Scoring/Interpretation:  Total score categories:  0-7 = No clinically significant insomnia   8-14 = Subthreshold insomnia   15-21  "= Clinical insomnia (moderate severity)  22-28 = Clinical insomnia (severe)  Used via courtesy of www.myhealth.va.gov with permission from [employer] PhD., [facility]    [employer]     Auto-PAP 5.0 - 15.0 cmH2O 30 day usage data:    10% of days with > 4 hours of use. 22/30 days with no use.   Average use 42 minutes per day.   95%ile Leak 32.85 L/min.   CPAP 95% pressure 8.6 cm.   AHI 3.68 events per hour.       Problem List:  Patient Active Problem List    Diagnosis Date Noted     Meningioma (H) 06/05/2020     Priority: Medium     C1 cervical fracture (H) 05/30/2020     Priority: Medium     ASCVD (arteriosclerotic cardiovascular disease)      Priority: Medium     Alcohol dependence (H) 01/12/2018     Priority: Medium     Down to 15-20 per week from 25-30       Chest pain      Priority: Medium     BARTOLO (obstructive sleep apnea) 11/09/2016     Priority: Medium     Mild BARTOLO       Essential hypertension 10/14/2016     Priority: Medium     Health Care Home 08/27/2013     Priority: Medium     Advance Care Planning 08/27/2013     Priority: Medium     Advance Care Planning 10/16/2015: ACP Review and Resources Provided:  Reviewed chart for advance care plan.  Christophe Del Rosario has a full code status from 8-27-13 but no document on file. Recommend resources be provided at next opportunity. Confirmed/documented legally designated decision maker(s). Added by Yessy Cuellar RN, System ACP Coordinator [facility]                   /79   Pulse 77   Ht 1.803 m (5' 11\")   Wt 89.4 kg (197 lb)   SpO2 98%   BMI 27.48 kg/m      Impression/Plan:     Moderate obstructive sleep apnea.     -Patient is struggling with his CPAP, mainly because of pressure intolerance.  He reports that when the device auto titrates to higher pressure, he is woken up and unable to keep CPAP on.  Narrowing of the AutoPAP pressure range has not helped.  Apparently, when he is able to use CPAP throughout the night, he has experienced a " benefit from treatment.    -Today, we reviewed CPAP desensitization techniques.  At this time, the focus will be to have him tolerate CPAP and we decided to keep him at a fixed at CPAP pressure of 5 cm H2O.  If he tolerates this low pressure consistently, we can slowly try and add a AutoPAP pressure range.  We discussed that if he remains intolerant of CPAP, oral appliance therapy through dental sleep medicine will be the alternative.    Plan:     1.  Change CPAP pressure to fixed pressure of 5 cm H2O  2.  Patient was advised to update me regarding progress in 1 to 2 months, to consider further change in pressure setting      Enrico Dsouza MD    CC:  Jorge L Mullen,

## 2022-10-18 NOTE — NURSING NOTE
"Chief Complaint   Patient presents with     CPAP Follow Up       Initial /79   Pulse 77   Ht 1.803 m (5' 11\")   Wt 89.4 kg (197 lb)   SpO2 98%   BMI 27.48 kg/m   Estimated body mass index is 27.48 kg/m  as calculated from the following:    Height as of this encounter: 1.803 m (5' 11\").    Weight as of this encounter: 89.4 kg (197 lb).    Medication Reconciliation: complete  ESS 5  Loida Wiley MA  "

## 2022-10-18 NOTE — PATIENT INSTRUCTIONS
Your There is no height or weight on file to calculate BMI.  Weight management is a personal decision.  If you are interested in exploring weight loss strategies, the following discussion covers the approaches that may be successful. Body mass index (BMI) is one way to tell whether you are at a healthy weight, overweight, or obese. It measures your weight in relation to your height.  A BMI of 18.5 to 24.9 is in the healthy range. A person with a BMI of 25 to 29.9 is considered overweight, and someone with a BMI of 30 or greater is considered obese. More than two-thirds of American adults are considered overweight or obese.  Being overweight or obese increases the risk for further weight gain. Excess weight may lead to heart disease and diabetes.  Creating and following plans for healthy eating and physical activity may help you improve your health.  Weight control is part of healthy lifestyle and includes exercise, emotional health, and healthy eating habits. Careful eating habits lifelong are the mainstay of weight control. Though there are significant health benefits from weight loss, long-term weight loss with diet alone may be very difficult to achieve- studies show long-term success with dietary management in less than 10% of people. Attaining a healthy weight may be especially difficult to achieve in those with severe obesity. In some cases, medications, devices and surgical management might be considered.  What can you do?  If you are overweight or obese and are interested in methods for weight loss, you should discuss this with your provider.     Consider reducing daily calorie intake by 500 calories.     Keep a food journal.     Avoiding skipping meals, consider cutting portions instead.    Diet combined with exercise helps maintain muscle while optimizing fat loss. Strength training is particularly important for building and maintaining muscle mass. Exercise helps reduce stress, increase energy, and  improves fitness. Increasing exercise without diet control, however, may not burn enough calories to loose weight.       Start walking three days a week 10-20 minutes at a time    Work towards walking thirty minutes five days a week     Eventually, increase the speed of your walking for 1-2 minutes at time    In addition, we recommend that you review healthy lifestyles and methods for weight loss available through the National Institutes of Health patient information sites:  http://win.niddk.nih.gov/publications/index.htm    And look into health and wellness programs that may be available through your health insurance provider, employer, local community center, or daisy club.

## 2022-11-10 NOTE — ADDENDUM NOTE
Addended by: GLORIA MOTT on: 2/2/2022 12:03 PM     Modules accepted: Orders     less than/equal to 3 secs

## 2022-11-11 DIAGNOSIS — G47.33 OSA (OBSTRUCTIVE SLEEP APNEA): Primary | ICD-10-CM

## 2022-11-17 ENCOUNTER — TELEPHONE (OUTPATIENT)
Dept: SLEEP MEDICINE | Facility: CLINIC | Age: 67
End: 2022-11-17

## 2022-11-17 NOTE — TELEPHONE ENCOUNTER
[facility]    PT STATES HIS MACHINE IS SHOWING LEAK MESAGE AND IS BEING VERY LOUD, HE HASNT BEEN ABLE TO YVONNE IT LAST WEEK. [facility] IS SHOWING HIGH LEAKAGE. SCHEDULED HIM FOR 11/18/22 @11AM W/ ME FOR TROUBLESHOOT.    Mac Angel, Respiratory DME Coordinator

## 2022-11-18 ENCOUNTER — DOCUMENTATION ONLY (OUTPATIENT)
Dept: SLEEP MEDICINE | Facility: CLINIC | Age: 67
End: 2022-11-18

## 2022-11-18 NOTE — PROGRESS NOTES
PT MACHINE IS WORKING FINE, ADJUSTED HUMIDIFER AND TUBING. ALSO TOLD HIM FILTER LOOKS FINE.  AS MESSAGE POPPED UP FOR FILTERS TO BE CHANGED. OVERALL MACHINE IS FINE PT IS HAVING HARD TIME ADJUSTING TO MASK AND STATES HES BEING WOKEN UP IN THE MIDDLE OF THE NIGHT W/ TOO MUCH PRESSURE. TOLD HIS DOCTOR AND HIS PRESSURES WERE CHANGED A FEW TIMES. TOLD HIM TO TAKE MACHINE WITH HIM WHEN HE TRAVELS AS HE IS GOING TO [place] FOR A WEEK AND A HALF FOR A . TO PUT IT ON DURING THE DAY AND GET TO AT MINIMUM OF 4 HOURS PER DAY. HE WAS SHOWN N20 AIRFIT L MASK AND N30I MASK WHEN HE MEETS COMPLIANCE HE CAN GET THOSE OPTIONS FOR SUPPLIES.    Mac Angel, Respiratory DME Coordinator

## 2022-12-05 ENCOUNTER — TELEPHONE (OUTPATIENT)
Dept: CARDIOLOGY | Facility: CLINIC | Age: 67
End: 2022-12-05

## 2022-12-05 DIAGNOSIS — I25.10 CORONARY ARTERY DISEASE INVOLVING NATIVE CORONARY ARTERY OF NATIVE HEART WITHOUT ANGINA PECTORIS: ICD-10-CM

## 2022-12-05 DIAGNOSIS — I10 BENIGN ESSENTIAL HYPERTENSION: ICD-10-CM

## 2022-12-05 RX ORDER — ATORVASTATIN CALCIUM 80 MG/1
80 TABLET, FILM COATED ORAL DAILY
Qty: 90 TABLET | Refills: 0 | Status: SHIPPED | OUTPATIENT
Start: 2022-12-05 | End: 2023-03-17

## 2022-12-05 RX ORDER — OLMESARTAN MEDOXOMIL 40 MG/1
40 TABLET ORAL DAILY
Qty: 90 TABLET | Refills: 1 | Status: SHIPPED | OUTPATIENT
Start: 2022-12-05 | End: 2023-03-17

## 2022-12-05 NOTE — TELEPHONE ENCOUNTER
I called Jag and told him we got a message that he needs his meds before his upcoming appointment in March but it didn't say what meds. He said he's afraid he will run out of all of them. I verified the pharmacy and will refill Olmesartan. His other meds do not need renewal.        [facility] Cardiology Refill Guideline reviewed.  Medication meets criteria for refill.

## 2022-12-05 NOTE — TELEPHONE ENCOUNTER
[facility] Call Center    Phone Message    May a detailed message be left on voicemail: yes     Reason for Call: Medication Refill Request    Has the patient contacted the pharmacy for the refill? Yes   Name of medication being requested: Essential hypertension [I10]  Pure hypercholesterolemia [E78.00]  ASCVD (arteriosclerotic cardiovascular disease) [I25.10]  Benign essential hypertension [I10]  Provider who prescribed the medication: Dr. Larry Sahni  Pharmacy:    [id number] - [place], [place] - [address] AT  [address] [facility]    Date medication is needed: 12/05/22     Pt has upcoming appt on March 17th with Dr. Ca. Will run out of meds before then . Pt is added to waitlist      Action Taken: Message routed to:  Other: Cardiology    Travel Screening: Not Applicable       Thank you!  [facility]

## 2022-12-05 NOTE — TELEPHONE ENCOUNTER
[facility] Cardiology Refill Guideline reviewed.  Medication meets criteria for refill.      I called Jag again (see previous note today) and told him I only refilled his Olmesartan because he had refills available for his other meds. He requested I refill his Atorvastatin because he is almost out of the bottle he's on and only has 1 more refill available. I told him I will send 1 refill today.

## 2023-02-15 ENCOUNTER — DOCUMENTATION ONLY (OUTPATIENT)
Dept: SLEEP MEDICINE | Facility: CLINIC | Age: 68
End: 2023-02-15
Payer: MEDICARE

## 2023-02-20 ENCOUNTER — TELEPHONE (OUTPATIENT)
Dept: CARDIOLOGY | Facility: CLINIC | Age: 68
End: 2023-02-20
Payer: MEDICARE

## 2023-02-20 NOTE — TELEPHONE ENCOUNTER
[facility] Call Center    Phone Message    May a detailed message be left on voicemail: yes     Reason for Call: Order(s): Other:   Reason for requested: labs for upcoming visit  Date needed: whenever possible  Provider name: Vignesh      [place] and needs labs to be sent to [facility] in [place].  Please forward labs so he can complete prior to 3/17 appt.         Action Taken: Other: cardio    Travel Screening: Not Applicable

## 2023-02-20 NOTE — TELEPHONE ENCOUNTER
Patient called back and states that the facility in [place] is able to do the lab and is asking for the labs to be ordered to be faxed to [phone]. Will fax tomorrow when in clinic. JENNIFER Hoyos RN

## 2023-02-20 NOTE — TELEPHONE ENCOUNTER
Called patient to discuss getting labs done and [place], patient has not asked the hospital if he can get outside labs done there or has a fax number.  Patient advised to call hospital to make sure they are okay doing outside labs and to get me a fax number where to fax the order to.  Patient agrees and will call back with this information.  Patient was given my direct number to call.  JENNIFER Hoyos RN

## 2023-02-23 NOTE — PROGRESS NOTES
Patient returned CPAP machine to [facility] showroom due to failed compliance. Pt did not use machine enough to meet insurance coverage requirements.

## 2023-03-02 PROBLEM — E78.2 MIXED HYPERLIPIDEMIA: Status: ACTIVE | Noted: 2023-03-02

## 2023-03-02 NOTE — TELEPHONE ENCOUNTER
Received call from patient stating that the [facility] has not received her labs from Dr. Larry Sahni order for FLP/ALT/BMP faxed again to [phone] as requested. Nanci BARTON

## 2023-03-04 ENCOUNTER — TRANSFERRED RECORDS (OUTPATIENT)
Dept: FAMILY MEDICINE | Facility: CLINIC | Age: 68
End: 2023-03-04

## 2023-03-07 ENCOUNTER — VIRTUAL VISIT (OUTPATIENT)
Dept: FAMILY MEDICINE | Facility: CLINIC | Age: 68
End: 2023-03-07

## 2023-03-07 ENCOUNTER — TELEPHONE (OUTPATIENT)
Dept: NEUROSURGERY | Facility: CLINIC | Age: 68
End: 2023-03-07
Payer: MEDICARE

## 2023-03-07 DIAGNOSIS — D32.0 BENIGN NEOPLASM OF CEREBRAL MENINGES (H): ICD-10-CM

## 2023-03-07 DIAGNOSIS — G93.89 BRAIN MASS: Primary | ICD-10-CM

## 2023-03-07 DIAGNOSIS — F10.20 UNCOMPLICATED ALCOHOL DEPENDENCE (H): ICD-10-CM

## 2023-03-07 PROCEDURE — 99214 OFFICE O/P EST MOD 30 MIN: CPT | Mod: GT | Performed by: FAMILY MEDICINE

## 2023-03-07 NOTE — PROGRESS NOTES
"    Video Problem(s) Oriented visit      Video Start Time: 9:40 AM    The patient has been notified of following:     \"This video visit will be conducted via a call between you and your physician/provider. We have found that certain health care needs can be provided without the need for an in-person physical exam.  This service lets us provide the care you need with a video conversation.  If a prescription is necessary we can send it directly to your pharmacy.  If lab work is needed we can place an order for that and you can then stop by our lab to have the test done at a later time.    Video visits are billed at different rates depending on your insurance coverage.  Please reach out to your insurance provider with any questions.    If during the course of the call the physician/provider feels a video visit is not appropriate, you will not be charged for this service.\"    Patient has given verbal consent for Video visit? Yes    How would you like to obtain your AVS? MyChart    Will anyone else be joining your video visit? Wife is here with  SUBJECTIVE:                                                    Christophe Del Rosario is a 67 year old male who presents to clinic today for the following health issues :    Had a head pain, dull got a into see a doc up in [facility]   MRI found a brain mass!  Now feeling very well.  Is now drinking less and quiting smoking   Talking with Dr. Womack today at 1pm today and Dr. Baker on Friday      Problem list, Medication list, Allergies, and Medical/Social/Surgical histories reviewed in [facility] and updated as appropriate.   Additional history: as documented    ROS:  General and Resp. completed and negative except as noted above    Histories:   Patient Active Problem List   Diagnosis     Health Care Home     Advance Care Planning     Essential hypertension     BARTOLO (obstructive sleep apnea)     Chest pain     Alcohol dependence (H)     ASCVD (arteriosclerotic cardiovascular disease)     " C1 cervical fracture (H)     Meningioma (H)     Mixed hyperlipidemia     No past surgical history on file.    Social History     Tobacco Use     Smoking status: Every Day     Packs/day: 0.10     Types: Cigarettes     Smokeless tobacco: Never     Tobacco comments:     1-2 CIGS A DAY   Substance Use Topics     Alcohol use: Yes     Alcohol/week: 20.0 standard drinks     Types: 20 Standard drinks or equivalent per week     Comment: 15-20 per week     Family History   Problem Relation Age of Onset     Coronary Artery Disease Father      Cerebrovascular Disease Brother 53        small stroke           OBJECTIVE:                                                    There were no vitals taken for this visit.  There is no height or weight on file to calculate BMI.   APPEARANCE: = Relaxed and in no distress     ASSESSMENT/PLAN:                                                        Christophe was seen today for results.    Diagnoses and all orders for this visit:    New Brain mass in setting of following a different Benign neoplasm of cerebral meninges (H)    Going to decrease his ETOH use from ongoing uncomplicated alcohol dependence (H)  >30 min spent with patient, greater than 50% spent on discussion/education/planning, etc. About The primary encounter diagnosis was Brain mass. Diagnoses of Uncomplicated alcohol dependence (H) and Benign neoplasm of cerebral meninges (H) were also pertinent to this visit.          will see Neurosurgeons and go from there    There are no Patient Instructions on file for this visit.    The following health maintenance items are reviewed in Epic and correct as of today:  Health Maintenance   Topic Date Due     HEPATITIS C SCREENING  Never done     ZOSTER IMMUNIZATION (1 of 2) Never done     MEDICARE ANNUAL WELLNESS VISIT  10/21/2020     AORTIC ANEURYSM SCREENING (SYSTEM ASSIGNED)  Never done     LUNG CANCER SCREENING  12/23/2020     ADVANCE CARE PLANNING  01/26/2021     NICOTINE/TOBACCO CESSATION  COUNSELING Q 1 YR  12/30/2022     PHQ-2 (once per calendar year)  01/01/2023     FALL RISK ASSESSMENT  02/10/2023     DTAP/TDAP/TD IMMUNIZATION (3 - Td or Tdap) 08/27/2023     COLORECTAL CANCER SCREENING  12/04/2024     LIPID  01/04/2027     INFLUENZA VACCINE  Completed     Pneumococcal Vaccine: 65+ Years  Completed     COVID-19 Vaccine  Completed     IPV IMMUNIZATION  Aged Out     MENINGITIS IMMUNIZATION  Aged Out       Video-Visit Details    This visit is being conducted as a virtual visit due to the emphasis on mitigation of the COVID-19 virus pandemic.   Type of service:  Video Visit    Originating Location (pt. Location): Home    Distant Location (provider location):  [facility]     Platform used for Video Visit: Mynor Mullen MD  [facility]  Family Practice  [facility]  [phone]    Video End Time:10:13 AM  For any issues my office # is [phone]

## 2023-03-07 NOTE — TELEPHONE ENCOUNTER
Brain MRI done on 3/4/23 at [facility]. Report scanned into epic chart.     Called [facility] ([phone]) to have MRI pushed into [place] pacs. MRI attached in pacs.     Message routed to Dr. Baker to see if he's ok with converting Friday's appt to phone as patient is up [place] in [place].

## 2023-03-07 NOTE — TELEPHONE ENCOUNTER
Dr. Baker is ok with 3/10 appt being converted to phone.     Appt changed to phone visit. Patient notified.

## 2023-03-07 NOTE — TELEPHONE ENCOUNTER
Patient is in [place], [place] and has an appointment on Friday morning.  Wants to know if Dr. Baker will speak to him over the phone so that he doesn't have to drive into the cities.

## 2023-03-08 ENCOUNTER — TRANSFERRED RECORDS (OUTPATIENT)
Dept: HEALTH INFORMATION MANAGEMENT | Facility: CLINIC | Age: 68
End: 2023-03-08
Payer: MEDICARE

## 2023-03-10 ENCOUNTER — VIRTUAL VISIT (OUTPATIENT)
Dept: NEUROSURGERY | Facility: CLINIC | Age: 68
End: 2023-03-10
Payer: MEDICARE

## 2023-03-10 DIAGNOSIS — D32.0 BENIGN NEOPLASM OF CEREBRAL MENINGES (H): Primary | ICD-10-CM

## 2023-03-10 PROCEDURE — 99442 PR PHYSICIAN TELEPHONE EVALUATION 11-20 MIN: CPT | Mod: 95 | Performed by: NEUROLOGICAL SURGERY

## 2023-03-10 NOTE — NURSING NOTE
Called to check in patient at mobile number, they are aware of a call coming around 940 from doctor Samuel.   Pt had localized pain at the back of the head last week and had an MRI taken. He was originally told they found a new tumor which was the reason for him making this appointment for today on Monday of this week. However, Tuesday the got a call telling him that this may actually not be a tumor and just swelling. Pt has benign tumor which he has been seeing Dr. Baker yearly for, so today he would like the second opinion of Dr. Baker..

## 2023-03-10 NOTE — PROGRESS NOTES
"It was a pleasure to see Christophe Del Rosario today in Neurosurgery Clinic. He is a 67 year old male who was last seen by me in the summer 2022.  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  He denies any obvious symptoms related to the tumor.    Earlier this month, he developed new L headache behind ear.  This prompted an MRI in [place].  On the new MRI the tumor was noted along with some signal changes in the tectum.  Review of his MRI shows that the meningioma appears stable in size when compared back with his imaging from 2020.  There may be a slight increase in the vasogenic edema around the tumor.  What was noted on his new MRI report that was different is some signal change in the tectum however review of his previous MRIs show that this was present previously and does not appear to have changed significantly since 2020 either.      There were no vitals filed for this visit.  Estimated body mass index is 27.48 kg/m  as calculated from the following:    Height as of 10/18/22: 1.803 m (5' 11\").    Weight as of 10/18/22: 89.4 kg (197 lb).  Data Unavailable    Phone visit    Imaging: As reviewed above    Assessment: 1.  Meningioma, stable.  2.  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    Plan: At this point we will plan on imaging follow-up in the clinic visit in the summer 2024.    We did briefly discussed treatment options including surgery and radiosurgery for a meningioma but at this point I think continued observation seems to be the best course of action.     This phone visit took 13 minutes.  MD Location: [place], [place]  Patient Location: [place], [place]   "

## 2023-03-10 NOTE — LETTER
"    3/10/2023         RE: Christophe Del Rosario  [address] [id number]        Dear Colleague,    Thank you for referring your patient, Christophe Del Rosario, to the [facility]. Please see a copy of my visit note below.    It was a pleasure to see Christophe Del Rosario today in Neurosurgery Clinic. He is a 67 year old male who was last seen by me in the summer 2022.  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  He denies any obvious symptoms related to the tumor.    Earlier this month, he developed new L headache behind ear.  This prompted an MRI in [place].  On the new MRI the tumor was noted along with some signal changes in the tectum.  Review of his MRI shows that the meningioma appears stable in size when compared back with his imaging from 2020.  There may be a slight increase in the vasogenic edema around the tumor.  What was noted on his new MRI report that was different is some signal change in the tectum however review of his previous MRIs show that this was present previously and does not appear to have changed significantly since 2020 either.      There were no vitals filed for this visit.  Estimated body mass index is 27.48 kg/m  as calculated from the following:    Height as of 10/18/22: 1.803 m (5' 11\").    Weight as of 10/18/22: 89.4 kg (197 lb).  Data Unavailable    Phone visit    Imaging: As reviewed above    Assessment: 1.  Meningioma, stable.  2.  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    Plan: At this point we will plan on imaging follow-up in the clinic visit in the summer 2024.    We did briefly discussed treatment options including surgery and radiosurgery for a meningioma but at this point I think continued observation seems to be the best course of action.     This phone visit took 13 minutes.  MD Location: [facility]  Patient Location: [place], [place]       Again, thank you " for allowing me to participate in the care of your patient.        Sincerely,        Adam Baker MD

## 2023-03-17 ENCOUNTER — VIRTUAL VISIT (OUTPATIENT)
Dept: CARDIOLOGY | Facility: CLINIC | Age: 68
End: 2023-03-17
Attending: INTERNAL MEDICINE
Payer: MEDICARE

## 2023-03-17 DIAGNOSIS — I25.10 CORONARY ARTERY DISEASE INVOLVING NATIVE CORONARY ARTERY OF NATIVE HEART WITHOUT ANGINA PECTORIS: Primary | ICD-10-CM

## 2023-03-17 DIAGNOSIS — I10 BENIGN ESSENTIAL HYPERTENSION: ICD-10-CM

## 2023-03-17 DIAGNOSIS — I10 ESSENTIAL HYPERTENSION: ICD-10-CM

## 2023-03-17 DIAGNOSIS — E78.00 PURE HYPERCHOLESTEROLEMIA: ICD-10-CM

## 2023-03-17 DIAGNOSIS — I25.10 ASCVD (ARTERIOSCLEROTIC CARDIOVASCULAR DISEASE): ICD-10-CM

## 2023-03-17 PROCEDURE — 99213 OFFICE O/P EST LOW 20 MIN: CPT | Mod: VID | Performed by: INTERNAL MEDICINE

## 2023-03-17 RX ORDER — AMLODIPINE BESYLATE 10 MG/1
10 TABLET ORAL DAILY
Qty: 90 TABLET | Refills: 3 | Status: SHIPPED | OUTPATIENT
Start: 2023-03-17 | End: 2023-12-19

## 2023-03-17 RX ORDER — ATORVASTATIN CALCIUM 80 MG/1
80 TABLET, FILM COATED ORAL DAILY
Qty: 90 TABLET | Refills: 3 | Status: SHIPPED | OUTPATIENT
Start: 2023-03-17 | End: 2023-12-19

## 2023-03-17 RX ORDER — OLMESARTAN MEDOXOMIL 40 MG/1
40 TABLET ORAL DAILY
Qty: 90 TABLET | Refills: 3 | Status: SHIPPED | OUTPATIENT
Start: 2023-03-17 | End: 2023-12-19
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
## 2023-03-17 NOTE — PROGRESS NOTES
Jag is a 67 year old who is being evaluated via a billable video visit.      How would you like to obtain your AVS? MyChart  If the video visit is dropped, the invitation should be resent by: Text to cell phone: [phone]  Will anyone else be joining your video visit? No      No vitals were obtained - Patient does not have access to a functional blood pressure machine.     Video-Visit Details    Type of service:  Video Visit   Video Start Time: 11:06  Video End Time:11:18  General:  no apparent distress, normal body habitus, sitting upright.  ENT/Mouth:  membranes moist, no nasal discharge.  Normal head shape, no apparent injury or laceration.  Eyes:  no scleral icterus, normal conjunctivae.  No observed jaundice.  Neck:  no apparent neck swelling.   Chest/Lungs:  No breathing difficulty while speaking.  No audible wheezing.  No cough during conversation.  Cardiovascular:  No obviously elevated jugular venous pressure.  No apparent edema bilaterally in LE.   Abdomen:  no obvious abdominal distention.   Extremities:  no apparent cyanosis.  Skin:  no xanthelasma.  No facial lacerations.  Neurologic:  Normal arm motion bilateral, no tremors.    Psychiatric:  Alert and oriented x3, calm demeanor    The rest of the comprehensive physical examination is deferred due to public health emergency video visit restrictions.    Originating Location (pt. Location): Home    Distant Location (provider location):  On-site  Platform used for Video Visit: Sterling Maravilla, EMT

## 2023-04-01 ENCOUNTER — HEALTH MAINTENANCE LETTER (OUTPATIENT)
Age: 68
End: 2023-04-01

## 2023-12-19 DIAGNOSIS — I10 BENIGN ESSENTIAL HYPERTENSION: ICD-10-CM

## 2023-12-19 DIAGNOSIS — I25.10 ASCVD (ARTERIOSCLEROTIC CARDIOVASCULAR DISEASE): ICD-10-CM

## 2023-12-19 DIAGNOSIS — I25.10 CORONARY ARTERY DISEASE INVOLVING NATIVE CORONARY ARTERY OF NATIVE HEART WITHOUT ANGINA PECTORIS: ICD-10-CM

## 2023-12-19 DIAGNOSIS — I10 ESSENTIAL HYPERTENSION: ICD-10-CM

## 2023-12-19 RX ORDER — AMLODIPINE BESYLATE 10 MG/1
10 TABLET ORAL DAILY
Qty: 90 TABLET | Refills: 1 | Status: SHIPPED | OUTPATIENT
Start: 2023-12-19

## 2023-12-19 RX ORDER — OLMESARTAN MEDOXOMIL 40 MG/1
40 TABLET ORAL DAILY
Qty: 90 TABLET | Refills: 1 | Status: SHIPPED | OUTPATIENT
Start: 2023-12-19 | End: 2024-08-13

## 2023-12-19 RX ORDER — ATORVASTATIN CALCIUM 80 MG/1
80 TABLET, FILM COATED ORAL DAILY
Qty: 90 TABLET | Refills: 1 | Status: SHIPPED | OUTPATIENT
Start: 2023-12-19

## 2024-06-02 ENCOUNTER — HEALTH MAINTENANCE LETTER (OUTPATIENT)
Age: 69
End: 2024-06-02

## 2024-06-07 ENCOUNTER — OFFICE VISIT (OUTPATIENT)
Dept: FAMILY MEDICINE | Facility: CLINIC | Age: 69
End: 2024-06-07

## 2024-06-07 VITALS
DIASTOLIC BLOOD PRESSURE: 66 MMHG | HEIGHT: 71 IN | BODY MASS INDEX: 26.6 KG/M2 | WEIGHT: 190 LBS | SYSTOLIC BLOOD PRESSURE: 127 MMHG | HEART RATE: 71 BPM | OXYGEN SATURATION: 99 %

## 2024-06-07 DIAGNOSIS — Z91.89 AT RISK FOR CLOSTRIDIUM DIFFICILE INFECTION: ICD-10-CM

## 2024-06-07 DIAGNOSIS — R19.5 LOOSE STOOLS: Primary | ICD-10-CM

## 2024-06-07 PROCEDURE — 99213 OFFICE O/P EST LOW 20 MIN: CPT

## 2024-06-07 RX ORDER — VARENICLINE TARTRATE 1 MG/1
1 TABLET, FILM COATED ORAL DAILY
COMMUNITY
Start: 2023-11-15
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
## 2024-06-08 PROCEDURE — 87493 C DIFF AMPLIFIED PROBE: CPT

## 2024-08-13 DIAGNOSIS — E78.00 PURE HYPERCHOLESTEROLEMIA: Primary | ICD-10-CM

## 2024-08-13 DIAGNOSIS — I10 BENIGN ESSENTIAL HYPERTENSION: ICD-10-CM

## 2024-08-13 RX ORDER — OLMESARTAN MEDOXOMIL 40 MG/1
40 TABLET ORAL DAILY
Qty: 90 TABLET | Refills: 1 | Status: SHIPPED | OUTPATIENT
Start: 2024-08-13

## 2024-08-13 NOTE — TELEPHONE ENCOUNTER
[facility] Cardiology Refill Guideline reviewed.  Medication does not meet criteria for refill due to needing to schedule a follow up appt.  Messaged to providers team for further review.

## 2024-08-13 NOTE — TELEPHONE ENCOUNTER
New orders for labs and cardiology follow up placed as old orders were . Message to patient that he needs to schedule labs and follow up with Dr. Medellin for additional refills 90 day with 1 refill given as provider is booked out until Dec/Reyes currently.

## 2024-09-06 ENCOUNTER — TELEPHONE (OUTPATIENT)
Dept: CARDIOLOGY | Facility: CLINIC | Age: 69
End: 2024-09-06
Payer: MEDICARE

## 2024-09-06 NOTE — TELEPHONE ENCOUNTER
RN reached out to patient via [employer] to provide us with fax number and we would be happy to fax the labs for him.

## 2024-09-06 NOTE — TELEPHONE ENCOUNTER
Health Call Center    Phone Message    May a detailed message be left on voicemail: yes     Reason for Call: Other: Patient would like his fasting labs done at [place], [place] . Please fax order to this facility and contact patient when this is done so he can call and schedule. Thank you     Action Taken: Other: cardiology    Travel Screening: Not Applicable  Thank you!  [facility]         Date of Service:

## 2024-09-12 PROBLEM — E78.5 HYPERLIPIDEMIA WITH TARGET LDL LESS THAN 70: Status: ACTIVE | Noted: 2024-09-12

## 2024-09-12 NOTE — TELEPHONE ENCOUNTER
Order for BMP, FLP/ALT faxed to [facility] at [phone] as requested. Nanci BARTON

## 2024-10-10 ENCOUNTER — TELEPHONE (OUTPATIENT)
Dept: CARDIOLOGY | Facility: CLINIC | Age: 69
End: 2024-10-10
Payer: MEDICARE

## 2024-10-10 DIAGNOSIS — I25.10 ASCVD (ARTERIOSCLEROTIC CARDIOVASCULAR DISEASE): ICD-10-CM

## 2024-10-10 DIAGNOSIS — I10 ESSENTIAL HYPERTENSION: ICD-10-CM

## 2024-10-10 DIAGNOSIS — I25.10 CORONARY ARTERY DISEASE INVOLVING NATIVE CORONARY ARTERY OF NATIVE HEART WITHOUT ANGINA PECTORIS: ICD-10-CM

## 2024-10-10 RX ORDER — ATORVASTATIN CALCIUM 80 MG/1
80 TABLET, FILM COATED ORAL DAILY
Qty: 90 TABLET | Refills: 1 | Status: SHIPPED | OUTPATIENT
Start: 2024-10-10

## 2024-10-10 RX ORDER — AMLODIPINE BESYLATE 10 MG/1
10 TABLET ORAL DAILY
Qty: 90 TABLET | Refills: 1 | Status: SHIPPED | OUTPATIENT
Start: 2024-10-10

## 2024-10-10 NOTE — TELEPHONE ENCOUNTER
[facility] Call Center    Phone Message    May a detailed message be left on voicemail: yes    Reason for Call: Medication Refill Request    Has the patient contacted the pharmacy for the refill? Yes   Name of medication being requested:  amLODIPine (NORVASC) 10 MG tablet  atorvastatin (LIPITOR) 80 MG tablet  Provider who prescribed the medication: Foster-Smith  Pharmacy: [facility]  Date medication is needed: 10/10/24    Thank you!  [facility]

## 2024-10-10 NOTE — TELEPHONE ENCOUNTER
[facility] Call Center    Phone Message    May a detailed message be left on voicemail: yes    Reason for Call: Patient called requesting for his lab orders to be sent to [facility]. Please call back for further information.    Thank you!  [facility]                                                               
Called pt & advised him that orders were faxed a month ago. Pt states he will check if orders were received & call back if orders need to be faxed again. Nanci BARTON   
Patient tolerated procedure well.
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
## 2025-01-09 ENCOUNTER — TELEPHONE (OUTPATIENT)
Dept: CARDIOLOGY | Facility: CLINIC | Age: 70
End: 2025-01-09
Payer: MEDICARE
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
## 2025-02-21 SDOH — HEALTH STABILITY: PHYSICAL HEALTH: ON AVERAGE, HOW MANY MINUTES DO YOU ENGAGE IN EXERCISE AT THIS LEVEL?: 30 MIN

## 2025-02-21 SDOH — HEALTH STABILITY: PHYSICAL HEALTH: ON AVERAGE, HOW MANY DAYS PER WEEK DO YOU ENGAGE IN MODERATE TO STRENUOUS EXERCISE (LIKE A BRISK WALK)?: 3 DAYS

## 2025-02-21 ASSESSMENT — SOCIAL DETERMINANTS OF HEALTH (SDOH): HOW OFTEN DO YOU GET TOGETHER WITH FRIENDS OR RELATIVES?: ONCE A WEEK

## 2025-02-25 NOTE — PATIENT INSTRUCTIONS
Patient Education   Preventive Care Advice   This is general advice given by our system to help you stay healthy. However, your care team may have specific advice just for you. Please talk to your care team about your preventive care needs.  Nutrition  Eat 5 or more servings of fruits and vegetables each day.  Try wheat bread, brown rice and whole grain pasta (instead of white bread, rice, and pasta).  Get enough calcium and vitamin D. Check the label on foods and aim for 100% of the RDA (recommended daily allowance).  Lifestyle  Exercise at least 150 minutes each week  (30 minutes a day, 5 days a week).  Do muscle strengthening activities 2 days a week. These help control your weight and prevent disease.  No smoking.  Wear sunscreen to prevent skin cancer.  Have a dental exam and cleaning every 6 months.  Yearly exams  See your health care team every year to talk about:  Any changes in your health.  Any medicines your care team has prescribed.  Preventive care, family planning, and ways to prevent chronic diseases.  Shots (vaccines)   HPV shots (up to age 26), if you've never had them before.  Hepatitis B shots (up to age 59), if you've never had them before.  COVID-19 shot: Get this shot when it's due.  Flu shot: Get a flu shot every year.  Tetanus shot: Get a tetanus shot every 10 years.  Pneumococcal, hepatitis A, and RSV shots: Ask your care team if you need these based on your risk.  Shingles shot (for age 50 and up)  General health tests  Diabetes screening:  Starting at age 35, Get screened for diabetes at least every 3 years.  If you are younger than age 35, ask your care team if you should be screened for diabetes.  Cholesterol test: At age 39, start having a cholesterol test every 5 years, or more often if advised.  Bone density scan (DEXA): At age 50, ask your care team if you should have this scan for osteoporosis (brittle bones).  Hepatitis C: Get tested at least once in your life.  STIs (sexually  transmitted infections)  Before age 24: Ask your care team if you should be screened for STIs.  After age 24: Get screened for STIs if you're at risk. You are at risk for STIs (including HIV) if:  You are sexually active with more than one person.  You don't use condoms every time.  You or a partner was diagnosed with a sexually transmitted infection.  If you are at risk for HIV, ask about PrEP medicine to prevent HIV.  Get tested for HIV at least once in your life, whether you are at risk for HIV or not.  Cancer screening tests  Cervical cancer screening: If you have a cervix, begin getting regular cervical cancer screening tests starting at age 21.  Breast cancer scan (mammogram): If you've ever had breasts, begin having regular mammograms starting at age 40. This is a scan to check for breast cancer.  Colon cancer screening: It is important to start screening for colon cancer at age 45.  Have a colonoscopy test every 10 years (or more often if you're at risk) Or, ask your provider about stool tests like a FIT test every year or Cologuard test every 3 years.  To learn more about your testing options, visit:   .  For help making a decision, visit:   https://bit.ly/ba56736.  Prostate cancer screening test: If you have a prostate, ask your care team if a prostate cancer screening test (PSA) at age 55 is right for you.  Lung cancer screening: If you are a current or former smoker ages 50 to 80, ask your care team if ongoing lung cancer screenings are right for you.  For informational purposes only. Not to replace the advice of your health care provider. Copyright   2023 [facility]. All rights reserved. Clinically reviewed by the [facility]. [employer] [id number] - REV 01/24.  Learning About Activities of Daily Living  What are activities of daily living?     Activities of daily living (ADLs) are the basic self-care tasks you do every day. These include eating, bathing, dressing,  and moving around.  As you age, and if you have health problems, you may find that it's harder to do some of these tasks. If so, your doctor can suggest ideas that may help.  To measure what kind of help you may need, your doctor will ask how well you are able to do ADLs. Let your doctor know if there are any tasks that you are having trouble doing. This is an important first step to getting help. And when you have the help you need, you can stay as independent as possible.  How will a doctor assess your ADLs?  Asking about ADLs is part of a routine health checkup your doctor will likely do as you age. Your health check might be done in a doctor's office, in your home, or at a hospital. The goal is to find out if you are having any problems that could make it hard to care for yourself or that make it unsafe for you to be on your own.  To measure your ADLs, your doctor will ask how hard it is for you to do routine tasks. Your doctor may also want to know if you have changed the way you do a task because of a health problem. Your doctor may watch how you:  Walk back and forth.  Keep your balance while you stand or walk.  Move from sitting to standing or from a bed to a chair.  Button or unbutton a shirt or sweater.  Remove and put on your shoes.  It's common to feel a little worried or anxious if you find you can't do all the things you used to be able to do. Talking with your doctor about ADLs is a way to make sure you're as safe as possible and able to care for yourself as well as you can. You may want to bring a caregiver, friend, or family member to your checkup. They can help you talk to your doctor.  Follow-up care is a key part of your treatment and safety. Be sure to make and go to all appointments, and call your doctor if you are having problems. It's also a good idea to know your test results and keep a list of the medicines you take.  Current as of: October 24, 2023  Content Version: 14.3    2024 [facility]  [employer].   Care instructions adapted under license by your healthcare professional. If you have questions about a medical condition or this instruction, always ask your healthcare professional. [facility] [employer] disclaims any warranty or liability for your use of this information.    Substance Use Disorder: Care Instructions  Overview     You can improve your life and health by stopping your use of alcohol or drugs. When you don't drink or use drugs, you may feel and sleep better. You may get along better with your family, friends, and coworkers. There are medicines and programs that can help with substance use disorder.  How can you care for yourself at home?  Here are some ways to help you stay sober and prevent relapse.  If you have been given medicine to help keep you sober or reduce your cravings, be sure to take it exactly as prescribed.  Talk to your doctor about programs that can help you stop using drugs or drinking alcohol.  Do not keep alcohol or drugs in your home.  Plan ahead. Think about what you'll say if other people ask you to drink or use drugs. Try not to spend time with people who drink or use drugs.  Use the time and money spent on drinking or drugs to do something that's important to you.  Preventing a relapse  Have a plan to deal with relapse. Learn to recognize changes in your thinking that lead you to drink or use drugs. Get help before you start to drink or use drugs again.  Try to stay away from situations, friends, or places that may lead you to drink or use drugs.  If you feel the need to drink alcohol or use drugs again, seek help right away. Call a trusted friend or family member. Some people get support from organizations such as Narcotics Anonymous or [employer] or from treatment facilities.  If you relapse, get help as soon as you can. Some people make a plan with another person that outlines what they want that person to do for them if they relapse. The plan  usually includes how to handle the relapse and who to notify in case of relapse.  Don't give up. Remember that a relapse doesn't mean that you have failed. Use the experience to learn the triggers that lead you to drink or use drugs. Then quit again. Recovery is a lifelong process. Many people have several relapses before they are able to quit for good.  Follow-up care is a key part of your treatment and safety. Be sure to make and go to all appointments, and call your doctor if you are having problems. It's also a good idea to know your test results and keep a list of the medicines you take.  When should you call for help?   Call 911  anytime you think you may need emergency care. For example, call if you or someone else:    Has overdosed or has withdrawal signs. Be sure to tell the emergency workers that you are or someone else is using or trying to quit using drugs. Overdose or withdrawal signs may include:  Losing consciousness.  Seizure.  Seeing or hearing things that aren't there (hallucinations).     Is thinking or talking about suicide or harming others.   Where to get help 24 hours a day, 7 days a week   If you or someone you know talks about suicide, self-harm, a mental health crisis, a substance use crisis, or any other kind of emotional distress, get help right away. You can:    Call the Suicide and Crisis Lifeline at 498.     Call 3-792-111-UTWG (1-980.762.1268).     Text HOME to 702933 to access the Crisis Text Line.   Consider saving these numbers in your phone.  Go to [employer] for more information or to chat online.  Call your doctor now or seek immediate medical care if:    You are having withdrawal symptoms. These may include nausea or vomiting, sweating, shakiness, and anxiety.   Watch closely for changes in your health, and be sure to contact your doctor if:    You have a relapse.     You need more help or support to stop.   Where can you learn more?  Go to  "https://www.[employer].net/patiented  Enter H573 in the search box to learn more about \"Substance Use Disorder: Care Instructions.\"  Current as of: November 15, 2023  Content Version: 14.3    2024 [employer].   Care instructions adapted under license by your healthcare professional. If you have questions about a medical condition or this instruction, always ask your healthcare professional. [employer] disclaims any warranty or liability for your use of this information.       "

## 2025-02-26 ENCOUNTER — OFFICE VISIT (OUTPATIENT)
Dept: CARDIOLOGY | Facility: CLINIC | Age: 70
End: 2025-02-26
Attending: INTERNAL MEDICINE
Payer: MEDICARE

## 2025-02-26 ENCOUNTER — OFFICE VISIT (OUTPATIENT)
Dept: FAMILY MEDICINE | Facility: CLINIC | Age: 70
End: 2025-02-26

## 2025-02-26 VITALS
DIASTOLIC BLOOD PRESSURE: 58 MMHG | WEIGHT: 190 LBS | SYSTOLIC BLOOD PRESSURE: 106 MMHG | HEART RATE: 71 BPM | HEIGHT: 70 IN | BODY MASS INDEX: 27.2 KG/M2 | OXYGEN SATURATION: 97 %

## 2025-02-26 VITALS
HEIGHT: 71 IN | WEIGHT: 190.8 LBS | BODY MASS INDEX: 26.71 KG/M2 | DIASTOLIC BLOOD PRESSURE: 64 MMHG | HEART RATE: 72 BPM | SYSTOLIC BLOOD PRESSURE: 120 MMHG

## 2025-02-26 DIAGNOSIS — R91.8 PULMONARY NODULES: ICD-10-CM

## 2025-02-26 DIAGNOSIS — K40.90 LEFT INGUINAL HERNIA: ICD-10-CM

## 2025-02-26 DIAGNOSIS — Z00.00 ENCOUNTER FOR MEDICARE ANNUAL WELLNESS EXAM: Primary | ICD-10-CM

## 2025-02-26 DIAGNOSIS — I10 ESSENTIAL HYPERTENSION: ICD-10-CM

## 2025-02-26 DIAGNOSIS — E78.5 HYPERLIPIDEMIA WITH TARGET LDL LESS THAN 70: ICD-10-CM

## 2025-02-26 DIAGNOSIS — F10.20 UNCOMPLICATED ALCOHOL DEPENDENCE (H): ICD-10-CM

## 2025-02-26 DIAGNOSIS — E78.00 PURE HYPERCHOLESTEROLEMIA: ICD-10-CM

## 2025-02-26 DIAGNOSIS — D32.9 MENINGIOMA (H): ICD-10-CM

## 2025-02-26 DIAGNOSIS — I25.10 CORONARY ARTERY DISEASE INVOLVING NATIVE CORONARY ARTERY OF NATIVE HEART WITHOUT ANGINA PECTORIS: ICD-10-CM

## 2025-02-26 DIAGNOSIS — G47.33 OSA (OBSTRUCTIVE SLEEP APNEA): ICD-10-CM

## 2025-02-26 DIAGNOSIS — I25.10 CORONARY ARTERY CALCIFICATION SEEN ON CT SCAN: Primary | ICD-10-CM

## 2025-02-26 DIAGNOSIS — I10 BENIGN ESSENTIAL HYPERTENSION: ICD-10-CM

## 2025-02-26 DIAGNOSIS — Z72.0 TOBACCO ABUSE: ICD-10-CM

## 2025-02-26 PROCEDURE — 3078F DIAST BP <80 MM HG: CPT | Performed by: FAMILY MEDICINE

## 2025-02-26 PROCEDURE — 3074F SYST BP LT 130 MM HG: CPT | Performed by: FAMILY MEDICINE

## 2025-02-26 PROCEDURE — G0439 PPPS, SUBSEQ VISIT: HCPCS | Performed by: FAMILY MEDICINE

## 2025-02-26 PROCEDURE — 99213 OFFICE O/P EST LOW 20 MIN: CPT | Mod: 25 | Performed by: FAMILY MEDICINE

## 2025-02-26 RX ORDER — OLMESARTAN MEDOXOMIL 40 MG/1
40 TABLET ORAL DAILY
Qty: 90 TABLET | Refills: 3 | Status: SHIPPED | OUTPATIENT
Start: 2025-02-26

## 2025-02-26 RX ORDER — NIRMATRELVIR AND RITONAVIR 150-100 MG
KIT ORAL
COMMUNITY
Start: 2024-10-01

## 2025-02-26 RX ORDER — AMLODIPINE BESYLATE 10 MG/1
10 TABLET ORAL DAILY
Qty: 90 TABLET | Refills: 3 | Status: SHIPPED | OUTPATIENT
Start: 2025-02-26

## 2025-02-26 NOTE — PROGRESS NOTES
"Preventive Care Visit  [facility]  Jorge L Mullen MD, Family Medicine  Feb 26, 2025      Assessment & Plan     Encounter for Medicare annual wellness exam    Uncomplicated alcohol dependence (H)  Has cut down since wife's death last june    Meningioma (H)  Known issue that has been stable     Pulmonary nodules due for CT follow up in 1/2026    BARTOLO (obstructive sleep apnea)  Can't wear machie    Hyperlipidemia with target LDL less than 70  Notes from cardiology reviewed from this morning  Agree with plan for stress    Essential hypertension  On current HTN management that is well tolerated. Goal for him is a systolic pressure at or below 128 and diastolic pressure at or below 83. Recommend to check BP regularly at home or work, instructed to contact me if the readings are not within guidelines on a regular basis so we can adjust treatment as needed.    We today managed his prescriptions with refills ensured to ensure availabilty of current medications.    Discussed the importance for aggressive management of HTN to prevent vascular complications later.  Recommended diet   Required intervals for follow up on HTN, lab studies reviewed.    Patient has been advised of split billing requirements and indicates understanding: Yes      BMI  Estimated body mass index is 27.66 kg/m  as calculated from the following:    Height as of this encounter: 1.765 m (5' 9.5\").    Weight as of this encounter: 86.2 kg (190 lb).       Counseling  Appropriate preventive services were addressed with this patient via screening, questionnaire, or discussion as appropriate for fall prevention, nutrition, physical activity, Tobacco-use cessation, social engagement, weight loss and cognition.  Checklist reviewing preventive services available has been given to the patient.  Reviewed patient's diet, addressing concerns and/or questions.   He is at risk for lack of exercise and has been provided with information to increase " physical activity for the benefit of his well-being.   Patient reported safety concerns were addressed today.    See Patient Instructions    No follow-ups on file.    Subjective   Jag is a 69 year old, presenting for the following:  Physical (Non fasting - physical.) and Abdominal Pain (Lower abdominal pain at night, started first week of January. CT scan found nothing abnormal. Patient has with him.)            HPI   Here for check up and to follow up on   Belt line pain when laying in bed.  Saw a [facility] with CT showing small nodule that needs follow up in a year    Health Care Directive  Patient does not have a Health Care Directive:   Hearing screen    Left ear:    500 HZ: Pass  1000 HZ: Pass  2000 HZ: Pass  4000 HZ: Pass    Right ear:    500 HZ: Pass  1000 HZ: Pass  2000 HZ: Pass  4000 HZ: Pass        2/21/2025   General Health   How would you rate your overall physical health? Good   Feel stress (tense, anxious, or unable to sleep) Only a little   (!) STRESS CONCERN      2/21/2025   Nutrition   Diet: Regular (no restrictions)         2/21/2025   Exercise   Days per week of moderate/strenous exercise 3 days   Average minutes spent exercising at this level 30 min         2/21/2025   Social Factors   Frequency of gathering with friends or relatives Once a week   Worry food won't last until get money to buy more No   Food not last or not have enough money for food? No   Do you have housing? (Housing is defined as stable permanent housing and does not include staying ouside in a car, in a tent, in an abandoned building, in an overnight shelter, or couch-surfing.) Yes   Are you worried about losing your housing? No   Lack of transportation? No   Unable to get utilities (heat,electricity)? No         2/21/2025   Fall Risk   Fallen 2 or more times in the past year? No   Trouble with walking or balance? No          2/21/2025   Activities of Daily Living- Home Safety   Needs help with the following daily activites  "None of the above   Safety concerns in the home Throw rugs in the hallway         2/21/2025   Dental   Dentist two times every year? Yes         2/21/2025   Hearing Screening   Hearing concerns? None of the above         2/21/2025   Driving Risk Screening   Patient/family members have concerns about driving No         2/21/2025   General Alertness/Fatigue Screening   Have you been more tired than usual lately? No         2/21/2025   Urinary Incontinence Screening   Bothered by leaking urine in past 6 months No            Today's PHQ-2 Score:       2/26/2025     1:24 PM   PHQ-2 ( 1999 [employer])   Q1: Little interest or pleasure in doing things 1   Q2: Feeling down, depressed or hopeless 1   PHQ-2 Score 2    Q1: Little interest or pleasure in doing things Several days   Q2: Feeling down, depressed or hopeless Several days   PHQ-2 Score 2       Patient-reported           2/21/2025   Substance Use   If I could quit smoking, I would Somewhat agree   I want to quit somking, worry about health affects Completely agree   Willing to make a plan to quit smoking Completely agree   Willing to cut down before quitting Completely agree   Alcohol more than 3/day or more than 7/wk No   Do you have a current opioid prescription? No   How severe/bad is pain from 1 to 10? 1/10   Do you use any other substances recreationally? (!) CANNABIS PRODUCTS    (!) BATH SALTS       Multiple values from one day are sorted in reverse-chronological order     Social History     Tobacco Use    Smoking status: Every Day     Current packs/day: 0.10     Types: Cigarettes    Smokeless tobacco: Never    Tobacco comments:     3-4 CIGS A DAY   Vaping Use    Vaping status: Never Used   Substance Use Topics    Alcohol use: Yes     Alcohol/week: 20.0 standard drinks of alcohol     Types: 20 Standard drinks or equivalent per week     Comment: 2 drinks daily    Drug use: No       Last PSA: No results found for: \"PSA\"  ASCVD Risk   The 10-year ASCVD risk " "score (Arturo RAMOS, et al., 2019) is: 10.6%    Values used to calculate the score:      Age: 69 years      Sex: Male      Is Non- : No      Diabetic: No      Tobacco smoker: Yes      Systolic Blood Pressure: 106 mmHg      Is BP treated: Yes      HDL Cholesterol: 96 mg/dL      Total Cholesterol: 132 mg/dL            Reviewed and updated as needed this visit by Provider                    Lab work is in process  Current providers sharing in care for this patient include:  Patient Care Team:  Jorge L Mullen MD as PCP - General (Family Practice)  Sugey Osorio APRN CNP as Assigned PCP  Rebecca Zhou APRN CNP as Nurse Practitioner (Cardiovascular Disease)    The following health maintenance items are reviewed in Epic and correct as of today:  Health Maintenance   Topic Date Due    HEPATITIS C SCREENING  Never done    ZOSTER IMMUNIZATION (1 of 2) Never done    RSV VACCINE (1 - Risk 60-74 years 1-dose series) Never done    AORTIC ANEURYSM SCREENING (SYSTEM ASSIGNED)  Never done    LUNG CANCER SCREENING  12/23/2020    ADVANCE CARE PLANNING  01/26/2021    COVID-19 Vaccine (8 - 2024-25 season) 09/01/2024    COLORECTAL CANCER SCREENING  12/04/2024    NICOTINE/TOBACCO CESSATION COUNSELING Q 1 YR  06/07/2025    GLUCOSE  08/01/2025    LIPID  11/13/2025    BMP  02/10/2026    MEDICARE ANNUAL WELLNESS VISIT  02/26/2026    FALL RISK ASSESSMENT  02/26/2026    DTAP/TDAP/TD IMMUNIZATION (3 - Td or Tdap) 06/05/2033    PHQ-2 (once per calendar year)  Completed    INFLUENZA VACCINE  Completed    Pneumococcal Vaccine: 50+ Years  Completed    HPV IMMUNIZATION  Aged Out    MENINGITIS IMMUNIZATION  Aged Out         Review of Systems  Constitutional, HEENT, cardiovascular, pulmonary, GI, , musculoskeletal, neuro, skin, endocrine and psych systems are negative, except as otherwise noted.     Objective    Exam  /58   Pulse 71   Ht 1.765 m (5' 9.5\")   Wt 86.2 kg (190 lb)   SpO2 97%   BMI 27.66 kg/m   " "  Estimated body mass index is 27.66 kg/m  as calculated from the following:    Height as of this encounter: 1.765 m (5' 9.5\").    Weight as of this encounter: 86.2 kg (190 lb).    Physical Exam  GENERAL: alert and no distress  EYES: Eyes grossly normal to inspection, PERRL and conjunctivae and sclerae normal  HENT: ear canals and TM's normal, nose and mouth without ulcers or lesions  NECK: no adenopathy, no asymmetry, masses, or scars  RESP: lungs clear to auscultation - no rales, rhonchi or wheezes  CV: regular rate and rhythm, normal S1 S2, no S3 or S4, no murmur, click or rub, no peripheral edema  ABDOMEN: soft, nontender, no hepatosplenomegaly, no masses and bowel sounds normal  MS: no gross musculoskeletal defects noted, no edema  SKIN: no suspicious lesions or rashes  NEURO: Normal strength and tone, mentation intact and speech normal  PSYCH: mentation appears normal, affect normal/bright         2/26/2025   Mini Cog   Clock Draw Score 2 Normal   3 Item Recall 2 objects recalled   Mini Cog Total Score 4         2/26/2025     1:33 PM   MINI COG   Clock Draw Score 2 Normal   3 Item Recall 2 objects recalled   Mini Cog Total Score 4                Signed Electronically by: Jorge L Mullen MD    "

## 2025-02-26 NOTE — LETTER
2/26/2025    Jorge L Mullen MD  [address]  [facility] [address]    RE: Christophe Del Rosario       Dear Colleague,     I had the pleasure of seeing Christophe Del Rosario in the [facility].  CARDIOLOGY CLINIC NOTE    PRIMARY CARDIOLOGIST  Dr. Medellin     PRIMARY CARE PHYSICIAN:  Jorge L Mullen    HISTORY OF PRESENT ILLNESS:  Christophe Del Rosario is a pleasant 69-year-old male with past medical history significant for coronary artery disease due to elevated calcium score, resistant hypertension, hyperlipidemia, obstructive sleep apnea (untreated) and tobacco abuse.    CT calcium screening in 2018 showed a total calcium score of 341.44, placing him in the 81st percentile when compared to age and gender matched control group.    Stress echocardiogram in 2018 was negative for stress-induced ischemia.  EF 55 to 60%.  No valvular disease.  Stress echocardiogram in 2019 was negative for stress-induced ischemia.  EF 60 to 65%.  No valvular disease.    He presents to the office today for overdue follow-up.  Unfortunately, Christophe has had a stressful year with the loss of his wife. She had developed Parkinson's and developed complications and ultimately took her own life.  He had recently moved [place] where he has a good support system. Overall, he is feeling well on a cardiac standpoint, denies chest pain, shortness of breath, palpitations, PND, orthopnea, presyncope, syncope or lower extremity edema.  He is struggling with abdominal issues and currently undergoing workup.    Blood pressure is well-controlled at 120/64, managed on olmesartan and amlodipine.  Blood work on 2/10/2025 ([facility]) showed an unremarkable BMP and CMP.  Lipid panel in November showed total cholesterol 132, HDL 96, LDL 29 and triglycerides 33.    He does not use his CPAP  Continues to smoke, and working on quitting.      PAST MEDICAL HISTORY:  Past Medical History:   Diagnosis Date     ASCVD  (arteriosclerotic cardiovascular disease)      Chest pain      HTN (hypertension) 02/27/2012     Hyperlipidemia with target LDL less than 70 09/12/2024     Mixed hyperlipidemia 03/02/2023       MEDICATIONS:  Current Outpatient Medications   Medication Sig Dispense Refill     amLODIPine (NORVASC) 10 MG tablet Take 1 tablet (10 mg) by mouth daily. 90 tablet 3     aspirin 81 MG chewable tablet Take 1 tablet (81 mg) by mouth daily 108 tablet 3     atorvastatin (LIPITOR) 80 MG tablet Take 1 tablet (80 mg) by mouth daily. 90 tablet 1     olmesartan (BENICAR) 40 MG tablet Take 1 tablet (40 mg) by mouth daily. 90 tablet 3     No current facility-administered medications for this visit.       SOCIAL HISTORY:  I have reviewed this patient's social history and updated it with pertinent information if needed. Christophe VILLANUEVA Miguel Ángel  reports that he has been smoking cigarettes. He has never used smokeless tobacco. He reports current alcohol use of about 20.0 standard drinks of alcohol per week. He reports that he does not use drugs.    PHYSICAL EXAM:  Pulse:  [72] 72  BP: (120)/(64) 120/64  190 lbs 12.8 oz    Constitutional: alert, no distress  Respiratory: Good bilateral air entry  Cardiovascular: Regular rate and rhythm  GI: nondistended  Neuropsychiatric: appropriate affact    ASSESSMENT/PLAN:  Pertinent issues addressed/ reviewed during this cardiology visit  Coronary calcification on CT -stress echocardiogram negative for ischemia in 2019.  Recommend a repeat stress echocardiogram to rule out ischemia in the setting of risk factors, hypertension, hyperlipidemia, tobacco use and recent personal loss.  Continue low-dose aspirin.  Further recommendations pending results of stress echocardiogram.  Hypertension -well-controlled, continue olmesartan and amlodipine  Hyperlipidemia -LDL at goal, continue atorvastatin  Obstructive sleep apnea -CPAP intolerance  Tobacco abuse -encourage cessation    Follow-up in 1 year with   Vignesh or sooner if needed.    It was a pleasure seeing this patient in clinic today. Please do not hesitate to contact me with any future questions.     CARA Alvarado, CNP  Cardiology - [facility]  02/26/2025       The level of medical decision making during this visit was of moderate complexity.    This note was completed in part using dictation via the Dragon voice recognition software. Some word and grammatical errors may occur and must be interpreted in the appropriate clinical context.  If there are any questions pertaining to this issue, please contact me for further clarification.      Thank you for allowing me to participate in the care of your patient.      Sincerely,     CARA Alvarado CNP     [facility]  cc:   Reji Medellin MD  [address]  [place] [id number]

## 2025-02-26 NOTE — PROGRESS NOTES
CARDIOLOGY CLINIC NOTE    PRIMARY CARDIOLOGIST  Dr. Medellin     PRIMARY CARE PHYSICIAN:  Jorge L Mullen    HISTORY OF PRESENT ILLNESS:  Christophe Del Rosario is a pleasant 69-year-old male with past medical history significant for coronary artery disease due to elevated calcium score, resistant hypertension, hyperlipidemia, obstructive sleep apnea (untreated) and tobacco abuse.    CT calcium screening in 2018 showed a total calcium score of 341.44, placing him in the 81st percentile when compared to age and gender matched control group.    Stress echocardiogram in 2018 was negative for stress-induced ischemia.  EF 55 to 60%.  No valvular disease.  Stress echocardiogram in 2019 was negative for stress-induced ischemia.  EF 60 to 65%.  No valvular disease.    He presents to the office today for overdue follow-up.  Unfortunately, Christophe has had a stressful year with the loss of his wife. She had developed Parkinson's and developed complications and ultimately took her own life.  He had recently moved [place] where he has a good support system. Overall, he is feeling well on a cardiac standpoint, denies chest pain, shortness of breath, palpitations, PND, orthopnea, presyncope, syncope or lower extremity edema.  He is struggling with abdominal issues and currently undergoing workup.    Blood pressure is well-controlled at 120/64, managed on olmesartan and amlodipine.  Blood work on 2/10/2025 ([facility]) showed an unremarkable BMP and CMP.  Lipid panel in November showed total cholesterol 132, HDL 96, LDL 29 and triglycerides 33.    He does not use his CPAP  Continues to smoke, and working on quitting.      PAST MEDICAL HISTORY:  Past Medical History:   Diagnosis Date    ASCVD (arteriosclerotic cardiovascular disease)     Chest pain     HTN (hypertension) 02/27/2012    Hyperlipidemia with target LDL less than 70 09/12/2024    Mixed hyperlipidemia 03/02/2023       MEDICATIONS:  Current Outpatient  Medications   Medication Sig Dispense Refill    amLODIPine (NORVASC) 10 MG tablet Take 1 tablet (10 mg) by mouth daily. 90 tablet 3    aspirin 81 MG chewable tablet Take 1 tablet (81 mg) by mouth daily 108 tablet 3    atorvastatin (LIPITOR) 80 MG tablet Take 1 tablet (80 mg) by mouth daily. 90 tablet 1    olmesartan (BENICAR) 40 MG tablet Take 1 tablet (40 mg) by mouth daily. 90 tablet 3     No current facility-administered medications for this visit.       SOCIAL HISTORY:  I have reviewed this patient's social history and updated it with pertinent information if needed. Christophe Del Rosario  reports that he has been smoking cigarettes. He has never used smokeless tobacco. He reports current alcohol use of about 20.0 standard drinks of alcohol per week. He reports that he does not use drugs.    PHYSICAL EXAM:  Pulse:  [72] 72  BP: (120)/(64) 120/64  190 lbs 12.8 oz    Constitutional: alert, no distress  Respiratory: Good bilateral air entry  Cardiovascular: Regular rate and rhythm  GI: nondistended  Neuropsychiatric: appropriate affact    ASSESSMENT/PLAN:  Pertinent issues addressed/ reviewed during this cardiology visit  Coronary calcification on CT -stress echocardiogram negative for ischemia in 2019.  Recommend a repeat stress echocardiogram to rule out ischemia in the setting of risk factors, hypertension, hyperlipidemia, tobacco use and recent personal loss.  Continue low-dose aspirin.  Further recommendations pending results of stress echocardiogram.  Hypertension -well-controlled, continue olmesartan and amlodipine  Hyperlipidemia -LDL at goal, continue atorvastatin  Obstructive sleep apnea -CPAP intolerance  Tobacco abuse -encourage cessation    Follow-up in 1 year with Dr. Medellin or sooner if needed.    It was a pleasure seeing this patient in clinic today. Please do not hesitate to contact me with any future questions.     Rebecca Zhou, APRMAURICIO, CNP  Cardiology - [facility]  02/26/2025       The level  of medical decision making during this visit was of moderate complexity.    This note was completed in part using dictation via the Dragon voice recognition software. Some word and grammatical errors may occur and must be interpreted in the appropriate clinical context.  If there are any questions pertaining to this issue, please contact me for further clarification.

## 2025-02-26 NOTE — PATIENT INSTRUCTIONS
Thanks for participating in a office visit with the [facility] today.    Doing well on a cardiac standpoint   Due to risk factors including hypertension, hyperlipidemia, elevated calcium score, tobacco use and stress, recommend a follow up stress echocardiogram. Will call with results.   Blood pressures well controlled.   Encourage exercise and heart healthy.   Encourage smoking cessation.   Due for fasting labs in November     Follow up in 1 year with Dr. Medellin     Please call my nurse at   [phone]. Call with any questions or concerns.    Scheduling phone number: [phone]  Reminder: Please bring in all current medications, over the counter supplements and vitamin bottles to your next appointment.

## 2025-02-27 PROBLEM — D12.6 ADENOMATOUS POLYP OF COLON: Status: ACTIVE | Noted: 2025-02-27

## 2025-03-04 ENCOUNTER — HOSPITAL ENCOUNTER (OUTPATIENT)
Dept: CARDIOLOGY | Facility: CLINIC | Age: 70
Discharge: HOME | End: 2025-03-04
Attending: NURSE PRACTITIONER
Payer: MEDICARE

## 2025-03-04 DIAGNOSIS — I25.10 CORONARY ARTERY DISEASE INVOLVING NATIVE CORONARY ARTERY OF NATIVE HEART WITHOUT ANGINA PECTORIS: ICD-10-CM

## 2025-03-04 DIAGNOSIS — I10 BENIGN ESSENTIAL HYPERTENSION: ICD-10-CM

## 2025-03-04 PROCEDURE — 93350 STRESS TTE ONLY: CPT | Mod: 26 | Performed by: INTERNAL MEDICINE

## 2025-03-04 PROCEDURE — 93016 CV STRESS TEST SUPVJ ONLY: CPT | Performed by: INTERNAL MEDICINE

## 2025-03-04 PROCEDURE — 93325 DOPPLER ECHO COLOR FLOW MAPG: CPT | Mod: TC

## 2025-03-04 PROCEDURE — 93018 CV STRESS TEST I&R ONLY: CPT | Performed by: INTERNAL MEDICINE

## 2025-03-04 PROCEDURE — 93321 DOPPLER ECHO F-UP/LMTD STD: CPT | Mod: 26 | Performed by: INTERNAL MEDICINE

## 2025-03-04 PROCEDURE — 93325 DOPPLER ECHO COLOR FLOW MAPG: CPT | Mod: 26 | Performed by: INTERNAL MEDICINE

## 2025-03-06 ENCOUNTER — TRANSFERRED RECORDS (OUTPATIENT)
Dept: FAMILY MEDICINE | Facility: CLINIC | Age: 70
End: 2025-03-06